# Patient Record
Sex: MALE | Race: WHITE | Employment: UNEMPLOYED | URBAN - METROPOLITAN AREA
[De-identification: names, ages, dates, MRNs, and addresses within clinical notes are randomized per-mention and may not be internally consistent; named-entity substitution may affect disease eponyms.]

---

## 2019-07-28 ENCOUNTER — HOSPITAL ENCOUNTER (INPATIENT)
Age: 49
LOS: 10 days | Discharge: HOME HEALTH | DRG: 871 | End: 2019-08-07
Attending: EMERGENCY MEDICINE | Admitting: INTERNAL MEDICINE
Payer: MEDICARE

## 2019-07-28 ENCOUNTER — APPOINTMENT (OUTPATIENT)
Dept: GENERAL RADIOLOGY | Age: 49
DRG: 871 | End: 2019-07-28
Attending: EMERGENCY MEDICINE
Payer: MEDICARE

## 2019-07-28 DIAGNOSIS — I26.90 ACUTE SEPTIC PULMONARY EMBOLISM WITHOUT ACUTE COR PULMONALE (HCC): ICD-10-CM

## 2019-07-28 DIAGNOSIS — J40 BRONCHITIS: ICD-10-CM

## 2019-07-28 DIAGNOSIS — J32.9 SINUSITIS: ICD-10-CM

## 2019-07-28 DIAGNOSIS — R65.20 SEVERE SEPSIS (HCC): ICD-10-CM

## 2019-07-28 DIAGNOSIS — A41.01 SEPSIS DUE TO METHICILLIN SUSCEPTIBLE STAPHYLOCOCCUS AUREUS (HCC): ICD-10-CM

## 2019-07-28 DIAGNOSIS — B95.61 BACTEREMIA DUE TO METHICILLIN SUSCEPTIBLE STAPHYLOCOCCUS AUREUS (MSSA): ICD-10-CM

## 2019-07-28 DIAGNOSIS — A41.9 SEVERE SEPSIS (HCC): ICD-10-CM

## 2019-07-28 DIAGNOSIS — N10 ACUTE PYELONEPHRITIS: Primary | ICD-10-CM

## 2019-07-28 DIAGNOSIS — R78.81 BACTEREMIA DUE TO METHICILLIN SUSCEPTIBLE STAPHYLOCOCCUS AUREUS (MSSA): ICD-10-CM

## 2019-07-28 DIAGNOSIS — E11.10 DIABETIC KETOACIDOSIS WITHOUT COMA ASSOCIATED WITH TYPE 2 DIABETES MELLITUS (HCC): ICD-10-CM

## 2019-07-28 DIAGNOSIS — J98.4 PULMONARY CAVITARY LESION: ICD-10-CM

## 2019-07-28 LAB
ADMINISTERED INITIALS, ADMINIT: NORMAL
ALBUMIN SERPL-MCNC: 3.3 G/DL (ref 3.5–5)
ALBUMIN/GLOB SERPL: 0.4 {RATIO} (ref 1.2–3.5)
ALP SERPL-CCNC: 131 U/L (ref 50–136)
ALT SERPL-CCNC: 16 U/L (ref 12–65)
ANION GAP SERPL CALC-SCNC: 28 MMOL/L (ref 7–16)
ARTERIAL PATENCY WRIST A: YES
AST SERPL-CCNC: 9 U/L (ref 15–37)
BACTERIA URNS QL MICRO: 0 /HPF
BASE DEFICIT BLD-SCNC: 20 MMOL/L
BDY SITE: ABNORMAL
BILIRUB SERPL-MCNC: 0.6 MG/DL (ref 0.2–1.1)
BODY TEMPERATURE: 98.6
BUN SERPL-MCNC: 25 MG/DL (ref 6–23)
CALCIUM SERPL-MCNC: 10.1 MG/DL (ref 8.3–10.4)
CASTS URNS QL MICRO: ABNORMAL /LPF
CHLORIDE SERPL-SCNC: 94 MMOL/L (ref 98–107)
CO2 BLD-SCNC: <5 MMOL/L
CO2 SERPL-SCNC: 6 MMOL/L (ref 21–32)
COLLECT TIME,HTIME: 2005
CREAT SERPL-MCNC: 1.49 MG/DL (ref 0.8–1.5)
D50 ADMINISTERED, D50ADM: 0 ML
D50 ORDER, D50ORD: 0 ML
DIFFERENTIAL METHOD BLD: ABNORMAL
EPI CELLS #/AREA URNS HPF: ABNORMAL /HPF
ERYTHROCYTE [DISTWIDTH] IN BLOOD BY AUTOMATED COUNT: 14.7 % (ref 11.9–14.6)
GAS FLOW.O2 O2 DELIVERY SYS: ABNORMAL L/MIN
GLOBULIN SER CALC-MCNC: 7.4 G/DL (ref 2.3–3.5)
GLSCOM COMMENTS: NORMAL
GLUCOSE BLD STRIP.AUTO-MCNC: 439 MG/DL (ref 65–100)
GLUCOSE BLD STRIP.AUTO-MCNC: 504 MG/DL (ref 65–100)
GLUCOSE SERPL-MCNC: 539 MG/DL (ref 65–100)
GLUCOSE, GLC: 307 MG/DL
GLUCOSE, GLC: 439 MG/DL
GLUCOSE, GLC: 504 MG/DL
HCO3 BLD-SCNC: 4.6 MMOL/L (ref 22–26)
HCT VFR BLD AUTO: 41.6 % (ref 41.1–50.3)
HGB BLD-MCNC: 12.6 G/DL (ref 13.6–17.2)
HIGH TARGET, HITG: 250 MG/DL
INSULIN ADMINSTERED, INSADM: 11.4 UNITS/HOUR
INSULIN ADMINSTERED, INSADM: 7.4 UNITS/HOUR
INSULIN ADMINSTERED, INSADM: 8.9 UNITS/HOUR
INSULIN ORDER, INSORD: 11.4 UNITS/HOUR
INSULIN ORDER, INSORD: 7.4 UNITS/HOUR
INSULIN ORDER, INSORD: 8.9 UNITS/HOUR
LACTATE BLD-SCNC: 3.35 MMOL/L (ref 0.5–1.9)
LOW TARGET, LOT: 150 MG/DL
LYMPHOCYTES # BLD: 1.6 K/UL (ref 0.5–4.6)
LYMPHOCYTES NFR BLD MANUAL: 8 % (ref 16–44)
MCH RBC QN AUTO: 26.5 PG (ref 26.1–32.9)
MCHC RBC AUTO-ENTMCNC: 30.3 G/DL (ref 31.4–35)
MCV RBC AUTO: 87.4 FL (ref 79.6–97.8)
MINUTES UNTIL NEXT BG, NBG: 60 MIN
MONOCYTES # BLD: 1.2 K/UL (ref 0.1–1.3)
MONOCYTES NFR BLD MANUAL: 6 % (ref 3–9)
MULTIPLIER, MUL: 0.02
MULTIPLIER, MUL: 0.03
MULTIPLIER, MUL: 0.03
NEUTS BAND NFR BLD MANUAL: 6 % (ref 0–10)
NEUTS SEG # BLD: 17.7 K/UL (ref 1.7–8.2)
NEUTS SEG NFR BLD MANUAL: 80 % (ref 47–75)
NRBC # BLD: 0 K/UL (ref 0–0.2)
ORDER INITIALS, ORDINIT: NORMAL
PCO2 BLD: 11.6 MMHG (ref 35–45)
PH BLD: 7.21 [PH] (ref 7.35–7.45)
PLATELET # BLD AUTO: 493 K/UL (ref 150–450)
PLATELET COMMENTS,PCOM: ABNORMAL
PMV BLD AUTO: 9.6 FL (ref 9.4–12.3)
PO2 BLD: 136 MMHG (ref 75–100)
POTASSIUM SERPL-SCNC: 4.8 MMOL/L (ref 3.5–5.1)
PROCALCITONIN SERPL-MCNC: 0.8 NG/ML
PROT SERPL-MCNC: 10.7 G/DL (ref 6.3–8.2)
RBC # BLD AUTO: 4.76 M/UL (ref 4.23–5.6)
RBC #/AREA URNS HPF: ABNORMAL /HPF
RBC MORPH BLD: ABNORMAL
SAO2 % BLD: 99 % (ref 95–98)
SERVICE CMNT-IMP: ABNORMAL
SERVICE CMNT-IMP: ABNORMAL
SODIUM SERPL-SCNC: 128 MMOL/L (ref 136–145)
SPECIMEN TYPE: ABNORMAL
WBC # BLD AUTO: 20.5 K/UL (ref 4.3–11.1)
WBC MORPH BLD: SLIGHT
WBC URNS QL MICRO: >100 /HPF

## 2019-07-28 PROCEDURE — 77030020263 HC SOL INJ SOD CL0.9% LFCR 1000ML

## 2019-07-28 PROCEDURE — 36600 WITHDRAWAL OF ARTERIAL BLOOD: CPT

## 2019-07-28 PROCEDURE — 87150 DNA/RNA AMPLIFIED PROBE: CPT

## 2019-07-28 PROCEDURE — 87205 SMEAR GRAM STAIN: CPT

## 2019-07-28 PROCEDURE — 87077 CULTURE AEROBIC IDENTIFY: CPT

## 2019-07-28 PROCEDURE — 82962 GLUCOSE BLOOD TEST: CPT

## 2019-07-28 PROCEDURE — 93005 ELECTROCARDIOGRAM TRACING: CPT | Performed by: INTERNAL MEDICINE

## 2019-07-28 PROCEDURE — 83605 ASSAY OF LACTIC ACID: CPT

## 2019-07-28 PROCEDURE — 81015 MICROSCOPIC EXAM OF URINE: CPT

## 2019-07-28 PROCEDURE — 65620000000 HC RM CCU GENERAL

## 2019-07-28 PROCEDURE — 87088 URINE BACTERIA CULTURE: CPT

## 2019-07-28 PROCEDURE — 77030019605

## 2019-07-28 PROCEDURE — 80053 COMPREHEN METABOLIC PANEL: CPT

## 2019-07-28 PROCEDURE — 99285 EMERGENCY DEPT VISIT HI MDM: CPT | Performed by: EMERGENCY MEDICINE

## 2019-07-28 PROCEDURE — 87186 SC STD MICRODIL/AGAR DIL: CPT

## 2019-07-28 PROCEDURE — 87086 URINE CULTURE/COLONY COUNT: CPT

## 2019-07-28 PROCEDURE — 74011250636 HC RX REV CODE- 250/636: Performed by: EMERGENCY MEDICINE

## 2019-07-28 PROCEDURE — 74011636637 HC RX REV CODE- 636/637: Performed by: INTERNAL MEDICINE

## 2019-07-28 PROCEDURE — 87040 BLOOD CULTURE FOR BACTERIA: CPT

## 2019-07-28 PROCEDURE — 71045 X-RAY EXAM CHEST 1 VIEW: CPT

## 2019-07-28 PROCEDURE — 74011250636 HC RX REV CODE- 250/636: Performed by: INTERNAL MEDICINE

## 2019-07-28 PROCEDURE — 84145 PROCALCITONIN (PCT): CPT

## 2019-07-28 PROCEDURE — 96375 TX/PRO/DX INJ NEW DRUG ADDON: CPT | Performed by: EMERGENCY MEDICINE

## 2019-07-28 PROCEDURE — 36415 COLL VENOUS BLD VENIPUNCTURE: CPT

## 2019-07-28 PROCEDURE — 83735 ASSAY OF MAGNESIUM: CPT

## 2019-07-28 PROCEDURE — 74011000258 HC RX REV CODE- 258: Performed by: INTERNAL MEDICINE

## 2019-07-28 PROCEDURE — 82803 BLOOD GASES ANY COMBINATION: CPT

## 2019-07-28 PROCEDURE — 96361 HYDRATE IV INFUSION ADD-ON: CPT | Performed by: EMERGENCY MEDICINE

## 2019-07-28 PROCEDURE — 96365 THER/PROPH/DIAG IV INF INIT: CPT | Performed by: EMERGENCY MEDICINE

## 2019-07-28 PROCEDURE — 85025 COMPLETE CBC W/AUTO DIFF WBC: CPT

## 2019-07-28 PROCEDURE — 74011000258 HC RX REV CODE- 258: Performed by: EMERGENCY MEDICINE

## 2019-07-28 RX ORDER — DEXTROSE MONOHYDRATE AND SODIUM CHLORIDE 5; .9 G/100ML; G/100ML
75 INJECTION, SOLUTION INTRAVENOUS CONTINUOUS
Status: DISCONTINUED | OUTPATIENT
Start: 2019-07-29 | End: 2019-07-29

## 2019-07-28 RX ORDER — NALOXONE HYDROCHLORIDE 0.4 MG/ML
0.4 INJECTION, SOLUTION INTRAMUSCULAR; INTRAVENOUS; SUBCUTANEOUS AS NEEDED
Status: DISCONTINUED | OUTPATIENT
Start: 2019-07-28 | End: 2019-08-07 | Stop reason: HOSPADM

## 2019-07-28 RX ORDER — ONDANSETRON 2 MG/ML
4 INJECTION INTRAMUSCULAR; INTRAVENOUS
Status: DISCONTINUED | OUTPATIENT
Start: 2019-07-28 | End: 2019-08-07 | Stop reason: HOSPADM

## 2019-07-28 RX ORDER — ACETAMINOPHEN 325 MG/1
650 TABLET ORAL
Status: DISCONTINUED | OUTPATIENT
Start: 2019-07-28 | End: 2019-08-07 | Stop reason: HOSPADM

## 2019-07-28 RX ORDER — SODIUM CHLORIDE 0.9 % (FLUSH) 0.9 %
5-40 SYRINGE (ML) INJECTION EVERY 8 HOURS
Status: DISCONTINUED | OUTPATIENT
Start: 2019-07-28 | End: 2019-08-07 | Stop reason: HOSPADM

## 2019-07-28 RX ORDER — MORPHINE SULFATE 2 MG/ML
2 INJECTION, SOLUTION INTRAMUSCULAR; INTRAVENOUS
Status: DISPENSED | OUTPATIENT
Start: 2019-07-28 | End: 2019-07-31

## 2019-07-28 RX ORDER — DEXTROSE 40 %
15 GEL (GRAM) ORAL AS NEEDED
Status: DISCONTINUED | OUTPATIENT
Start: 2019-07-28 | End: 2019-07-28

## 2019-07-28 RX ORDER — SODIUM CHLORIDE 0.9 % (FLUSH) 0.9 %
5-40 SYRINGE (ML) INJECTION AS NEEDED
Status: DISCONTINUED | OUTPATIENT
Start: 2019-07-28 | End: 2019-08-07 | Stop reason: HOSPADM

## 2019-07-28 RX ORDER — DIPHENHYDRAMINE HYDROCHLORIDE 50 MG/ML
12.5 INJECTION, SOLUTION INTRAMUSCULAR; INTRAVENOUS
Status: DISCONTINUED | OUTPATIENT
Start: 2019-07-28 | End: 2019-08-07 | Stop reason: HOSPADM

## 2019-07-28 RX ORDER — DEXTROSE 50 % IN WATER (D50W) INTRAVENOUS SYRINGE
25-50 AS NEEDED
Status: DISCONTINUED | OUTPATIENT
Start: 2019-07-28 | End: 2019-07-28

## 2019-07-28 RX ORDER — MORPHINE SULFATE 2 MG/ML
2 INJECTION, SOLUTION INTRAMUSCULAR; INTRAVENOUS ONCE
Status: COMPLETED | OUTPATIENT
Start: 2019-07-29 | End: 2019-07-29

## 2019-07-28 RX ORDER — SODIUM CHLORIDE 0.9 % (FLUSH) 0.9 %
5-10 SYRINGE (ML) INJECTION AS NEEDED
Status: DISCONTINUED | OUTPATIENT
Start: 2019-07-28 | End: 2019-08-07 | Stop reason: HOSPADM

## 2019-07-28 RX ORDER — DEXTROSE 40 %
15 GEL (GRAM) ORAL AS NEEDED
Status: DISCONTINUED | OUTPATIENT
Start: 2019-07-28 | End: 2019-08-07 | Stop reason: HOSPADM

## 2019-07-28 RX ORDER — DEXTROSE 50 % IN WATER (D50W) INTRAVENOUS SYRINGE
25-50 AS NEEDED
Status: DISCONTINUED | OUTPATIENT
Start: 2019-07-28 | End: 2019-08-07 | Stop reason: HOSPADM

## 2019-07-28 RX ORDER — ONDANSETRON 2 MG/ML
4 INJECTION INTRAMUSCULAR; INTRAVENOUS
Status: COMPLETED | OUTPATIENT
Start: 2019-07-28 | End: 2019-07-28

## 2019-07-28 RX ORDER — METOPROLOL TARTRATE 5 MG/5ML
5 INJECTION INTRAVENOUS ONCE
Status: COMPLETED | OUTPATIENT
Start: 2019-07-29 | End: 2019-07-29

## 2019-07-28 RX ORDER — SODIUM CHLORIDE 9 MG/ML
125 INJECTION, SOLUTION INTRAVENOUS CONTINUOUS
Status: DISCONTINUED | OUTPATIENT
Start: 2019-07-29 | End: 2019-07-29

## 2019-07-28 RX ORDER — HEPARIN SODIUM 5000 [USP'U]/ML
5000 INJECTION, SOLUTION INTRAVENOUS; SUBCUTANEOUS EVERY 8 HOURS
Status: DISCONTINUED | OUTPATIENT
Start: 2019-07-28 | End: 2019-07-29

## 2019-07-28 RX ADMIN — CEFTRIAXONE 1 G: 1 INJECTION, POWDER, FOR SOLUTION INTRAMUSCULAR; INTRAVENOUS at 19:53

## 2019-07-28 RX ADMIN — SODIUM CHLORIDE 11.4 UNITS/HR: 900 INJECTION, SOLUTION INTRAVENOUS at 22:41

## 2019-07-28 RX ADMIN — SODIUM CHLORIDE 1000 ML: 900 INJECTION, SOLUTION INTRAVENOUS at 19:52

## 2019-07-28 RX ADMIN — HEPARIN SODIUM 5000 UNITS: 5000 INJECTION INTRAVENOUS; SUBCUTANEOUS at 23:01

## 2019-07-28 RX ADMIN — SODIUM CHLORIDE 8.9 UNITS/HR: 900 INJECTION, SOLUTION INTRAVENOUS at 21:39

## 2019-07-28 RX ADMIN — Medication 10 ML: at 23:02

## 2019-07-28 RX ADMIN — MORPHINE SULFATE 2 MG: 2 INJECTION, SOLUTION INTRAMUSCULAR; INTRAVENOUS at 23:01

## 2019-07-28 RX ADMIN — SODIUM CHLORIDE 448 ML: 900 INJECTION, SOLUTION INTRAVENOUS at 19:53

## 2019-07-28 RX ADMIN — SODIUM CHLORIDE 1000 ML: 900 INJECTION, SOLUTION INTRAVENOUS at 19:53

## 2019-07-28 RX ADMIN — ONDANSETRON 4 MG: 2 INJECTION INTRAMUSCULAR; INTRAVENOUS at 19:52

## 2019-07-28 NOTE — ED TRIAGE NOTES
Patient arrives via EMS with complaints of nausea and vomiting for 2 days. Pt states normal BMs. Patient was recently treated for a UTI, states dysuria remains. Abdomen noted to be distended. Patient complains of generalized pain from head to toe.

## 2019-07-28 NOTE — ED PROVIDER NOTES
57-year-old  male presents to the emergency department complaining of nausea, vomiting over the last 2-3 days with some progressively worsening left flank/left abdominal pain starting today. Patient does describe increased urinary frequency as well as being treated for UTI about 3 weeks ago for one week. He denies any significant cough. The history is provided by the patient and the spouse. Vomiting    This is a new problem. The current episode started more than 2 days ago. The problem occurs 5 to 10 times per day. The problem has been gradually worsening. The emesis has an appearance of stomach contents and clear. There has been no fever. Associated symptoms include chills and abdominal pain (left flank). Pertinent negatives include no fever, no sweats, no diarrhea, no headaches, no arthralgias, no myalgias, no cough, no URI and no headaches. His past medical history is significant for DM. His pertinent negatives include no irritable bowel syndrome, no inflammatory bowel disease, no short gut syndrome, no bowel resection, no recent abdominal surgery, no malabsorption and no gastric bypass.         Past Medical History:   Diagnosis Date    Chronic back pain     DM [facility]) [facility]     DM (diabetes mellitus) ([facility] [facility] 11/18/2013    GERD (gastroesophageal reflux disease)     HTN (hypertension)     HTN (hypertension) 11/18/2013    Snoring        Past Surgical History:   Procedure Laterality Date    HX ORTHOPAEDIC  2010    right fore foot    HX SEPTOPLASTY           Family History:   Problem Relation Age of Onset    Stroke Mother     Heart Disease Father        Social History     Socioeconomic History    Marital status: SINGLE     Spouse name: Not on file    Number of children: Not on file    Years of education: Not on file    Highest education level: Not on file   Occupational History    Not on file   Social Needs    Financial resource strain: Not on file    Food insecurity: Worry: Not on file     Inability: Not on file    Transportation needs:     Medical: Not on file     Non-medical: Not on file   Tobacco Use    Smoking status: Never Smoker    Smokeless tobacco: Never Used   Substance and Sexual Activity    Alcohol use: No    Drug use: No    Sexual activity: Not on file   Lifestyle    Physical activity:     Days per week: Not on file     Minutes per session: Not on file    Stress: Not on file   Relationships    Social connections:     Talks on phone: Not on file     Gets together: Not on file     Attends Buddhist service: Not on file     Active member of club or organization: Not on file     Attends meetings of clubs or organizations: Not on file     Relationship status: Not on file    Intimate partner violence:     Fear of current or ex partner: Not on file     Emotionally abused: Not on file     Physically abused: Not on file     Forced sexual activity: Not on file   Other Topics Concern    Not on file   Social History Narrative    Not on file         ALLERGIES: Influenza virus vaccine, specific    Review of Systems   Constitutional: Positive for chills. Negative for fever. Respiratory: Positive for shortness of breath. Negative for cough and wheezing. Gastrointestinal: Positive for abdominal pain (left flank), nausea and vomiting. Negative for blood in stool, constipation and diarrhea. Genitourinary: Positive for dysuria and frequency. Negative for testicular pain. Musculoskeletal: Negative for arthralgias and myalgias. Neurological: Negative for headaches. All other systems reviewed and are negative. Vitals:    07/28/19 1845   BP: 151/84   Pulse: (!) 112   Resp: 26   Temp: 97.6 °F (36.4 °C)   Weight: 81.6 kg (180 lb)   Height: 5' 11\" (1.803 m)            Physical Exam   Constitutional: He is oriented to person, place, and time. He appears well-developed and well-nourished. He appears distressed. HENT:   Head: Normocephalic and atraumatic.    Right Ear: External ear normal.   Left Ear: External ear normal.   Mouth/Throat: Oropharynx is clear and moist.   Eyes: Pupils are equal, round, and reactive to light. Conjunctivae and EOM are normal.   Neck: Normal range of motion. Neck supple. Cardiovascular: Normal rate, regular rhythm, normal heart sounds and intact distal pulses. Pulmonary/Chest: Effort normal and breath sounds normal. No accessory muscle usage. Tachypnea noted. He has no wheezes. He has no rhonchi. He has no rales. Abdominal: Soft. Bowel sounds are normal. He exhibits no shifting dullness, no distension, no pulsatile liver, no fluid wave, no abdominal bruit, no ascites, no pulsatile midline mass and no mass. There is no hepatosplenomegaly. There is tenderness in the left lower quadrant. There is CVA tenderness (left). No hernia. Musculoskeletal: Normal range of motion. He exhibits no edema. Neurological: He is alert and oriented to person, place, and time. He has normal strength. No sensory deficit. No evidence of meningismus   Skin: Skin is warm and dry. Capillary refill takes less than 2 seconds. Psychiatric: He has a normal mood and affect. His speech is normal.   Nursing note and vitals reviewed.        MDM  Number of Diagnoses or Management Options  Diabetic ketoacidosis without coma associated with type 2 diabetes mellitus ([facility] [address]): new and requires workup  Severe sepsis [facility]): new and requires workup     Amount and/or Complexity of Data Reviewed  Clinical lab tests: ordered and reviewed  Tests in the radiology section of CPT®: ordered and reviewed  Obtain history from someone other than the patient: yes  Review and summarize past medical records: yes  Discuss the patient with other providers: yes  Independent visualization of images, tracings, or specimens: yes    Risk of Complications, Morbidity, and/or Mortality  Presenting problems: high  Diagnostic procedures: moderate  Management options: high    Critical Care  Total time providing critical care: (===================================================================  This patient is critically ill and there is a high probability of of imminent or life threatening deterioration in the patient's condition without immediate management. The nature of the patient's clinical problem is: severe sepsis, DKA    I have spent 45 minutes in direct patient care, documentation, review of labs/xrays/old records, discussion with Family, Staff, Colleague, Nursing . The time involved in the performance of separately reportable procedures was not counted toward critical care time. Clayton Gurrola MD; 7/28/2019 @8:41 PM  ===================================================================    )    Patient Progress  Patient progress: stable         Procedures    The patient was observed in the ED. Patient was treated with severe sepsis due to probable urinary source with Rocephin and fluids, he'll be placed on a glucose stabilizer per protocol for his DKA. Case was discussed with the hospitalist will admit to the ICU.     Results Reviewed:      Recent Results (from the past 24 hour(s))   POC LACTIC ACID    Collection Time: 07/28/19  6:57 PM   Result Value Ref Range    Lactic Acid (POC) 3.35 (H) 0.5 - 1.9 mmol/L   URINE MICROSCOPIC    Collection Time: 07/28/19  7:01 PM   Result Value Ref Range    WBC >100 (H) 0 /hpf    RBC 20-50 0 /hpf    Epithelial cells 0-3 0 /hpf    Bacteria 0 0 /hpf    Casts 0-3 0 /lpf   CBC WITH AUTOMATED DIFF    Collection Time: 07/28/19  7:33 PM   Result Value Ref Range    WBC 20.5 (H) 4.3 - 11.1 K/uL    RBC 4.76 4.23 - 5.6 M/uL    HGB 12.6 (L) 13.6 - 17.2 g/dL    HCT 41.6 41.1 - 50.3 %    MCV 87.4 79.6 - 97.8 FL    MCH 26.5 26.1 - 32.9 PG    MCHC 30.3 (L) 31.4 - 35.0 g/dL    RDW 14.7 (H) 11.9 - 14.6 %    PLATELET 291 (H) 729 - 450 K/uL    MPV 9.6 9.4 - 12.3 FL    ABSOLUTE NRBC 0.00 0.0 - 0.2 K/uL    NEUTROPHILS 80 (H) 47 - 75 %    BAND NEUTROPHILS 6 0 - 10 % LYMPHOCYTES 8 (L) 16 - 44 %    MONOCYTES 6 3 - 9 %    ABS. NEUTROPHILS 17.7 (H) 1.7 - 8.2 K/UL    ABS. LYMPHOCYTES 1.6 0.5 - 4.6 K/UL    ABS. MONOCYTES 1.2 0.1 - 1.3 K/UL    RBC COMMENTS SLIGHT  ANISOCYTOSIS + POIKILOCYTOSIS        WBC COMMENTS SLIGHT      PLATELET COMMENTS INCREASED      DF MANUAL     METABOLIC PANEL, COMPREHENSIVE    Collection Time: 07/28/19  7:33 PM   Result Value Ref Range    Sodium 128 (L) 136 - 145 mmol/L    Potassium 4.8 3.5 - 5.1 mmol/L    Chloride 94 (L) 98 - 107 mmol/L    CO2 6 (LL) 21 - 32 mmol/L    Anion gap 28 (H) 7 - 16 mmol/L    Glucose 539 (HH) 65 - 100 mg/dL    BUN 25 (H) 6 - 23 MG/DL    Creatinine 1.49 0.8 - 1.5 MG/DL    GFR est AA >60 >60 ml/min/1.73m2    GFR est non-AA 53 (L) >60 ml/min/1.73m2    Calcium 10.1 8.3 - 10.4 MG/DL    Bilirubin, total 0.6 0.2 - 1.1 MG/DL    ALT (SGPT) 16 12 - 65 U/L    AST (SGOT) 9 (L) 15 - 37 U/L    Alk. phosphatase 131 50 - 136 U/L    Protein, total 10.7 (H) 6.3 - 8.2 g/dL    Albumin 3.3 (L) 3.5 - 5.0 g/dL    Globulin 7.4 (H) 2.3 - 3.5 g/dL    A-G Ratio 0.4 (L) 1.2 - 3.5     POC G3    Collection Time: 07/28/19  8:10 PM   Result Value Ref Range    Device: ROOM AIR      pH (POC) 7.210 (LL) 7.35 - 7.45      pCO2 (POC) 11.6 (LL) 35 - 45 MMHG    pO2 (POC) 136 (H) 75 - 100 MMHG    HCO3 (POC) 4.6 (L) 22 - 26 MMOL/L    sO2 (POC) 99 (H) 95 - 98 %    Base deficit (POC) 20 mmol/L    Allens test (POC) YES      Site RIGHT RADIAL      Patient temp. 98.6      Specimen type (POC) ARTERIAL      Performed by WhitfieldWalterRT     CO2, POC <5 MMOL/L    Respiratory comment: PhysicianNotified     COLLECT TIME 2,005       XR CHEST PORT   Final Result   IMPRESSION:      1. No pulmonary consolidation. Dictated using voice recognition software.  Proofread, but unrecognized errors may exist.

## 2019-07-29 ENCOUNTER — APPOINTMENT (OUTPATIENT)
Dept: CT IMAGING | Age: 49
DRG: 871 | End: 2019-07-29
Attending: INTERNAL MEDICINE
Payer: MEDICARE

## 2019-07-29 PROBLEM — N39.0 SEPSIS SECONDARY TO UTI (HCC): Status: ACTIVE | Noted: 2019-07-29

## 2019-07-29 PROBLEM — A41.9 SEPSIS SECONDARY TO UTI (HCC): Status: ACTIVE | Noted: 2019-07-29

## 2019-07-29 LAB
ADMINISTERED INITIALS, ADMINIT: NORMAL
ANION GAP SERPL CALC-SCNC: 10 MMOL/L (ref 7–16)
ANION GAP SERPL CALC-SCNC: 12 MMOL/L (ref 7–16)
ANION GAP SERPL CALC-SCNC: 16 MMOL/L (ref 7–16)
ANION GAP SERPL CALC-SCNC: 22 MMOL/L (ref 7–16)
ATRIAL RATE: 121 BPM
BASOPHILS # BLD: 0 K/UL (ref 0–0.2)
BASOPHILS NFR BLD: 0 % (ref 0–2)
BUN SERPL-MCNC: 10 MG/DL (ref 6–23)
BUN SERPL-MCNC: 14 MG/DL (ref 6–23)
BUN SERPL-MCNC: 16 MG/DL (ref 6–23)
BUN SERPL-MCNC: 16 MG/DL (ref 6–23)
BUN SERPL-MCNC: 19 MG/DL (ref 6–23)
BUN SERPL-MCNC: 21 MG/DL (ref 6–23)
CALCIUM SERPL-MCNC: 8.3 MG/DL (ref 8.3–10.4)
CALCIUM SERPL-MCNC: 8.5 MG/DL (ref 8.3–10.4)
CALCIUM SERPL-MCNC: 8.7 MG/DL (ref 8.3–10.4)
CALCIUM SERPL-MCNC: 9.5 MG/DL (ref 8.3–10.4)
CALCULATED P AXIS, ECG09: 65 DEGREES
CALCULATED R AXIS, ECG10: 46 DEGREES
CALCULATED T AXIS, ECG11: 76 DEGREES
CHLORIDE SERPL-SCNC: 106 MMOL/L (ref 98–107)
CHLORIDE SERPL-SCNC: 107 MMOL/L (ref 98–107)
CHLORIDE SERPL-SCNC: 108 MMOL/L (ref 98–107)
CHLORIDE SERPL-SCNC: 110 MMOL/L (ref 98–107)
CHLORIDE SERPL-SCNC: 113 MMOL/L (ref 98–107)
CHLORIDE SERPL-SCNC: 113 MMOL/L (ref 98–107)
CO2 SERPL-SCNC: 13 MMOL/L (ref 21–32)
CO2 SERPL-SCNC: 16 MMOL/L (ref 21–32)
CO2 SERPL-SCNC: 17 MMOL/L (ref 21–32)
CO2 SERPL-SCNC: 17 MMOL/L (ref 21–32)
CO2 SERPL-SCNC: 18 MMOL/L (ref 21–32)
CO2 SERPL-SCNC: 9 MMOL/L (ref 21–32)
CREAT SERPL-MCNC: 0.7 MG/DL (ref 0.8–1.5)
CREAT SERPL-MCNC: 0.73 MG/DL (ref 0.8–1.5)
CREAT SERPL-MCNC: 0.81 MG/DL (ref 0.8–1.5)
CREAT SERPL-MCNC: 0.91 MG/DL (ref 0.8–1.5)
CREAT SERPL-MCNC: 1.08 MG/DL (ref 0.8–1.5)
CREAT SERPL-MCNC: 1.36 MG/DL (ref 0.8–1.5)
D50 ADMINISTERED, D50ADM: 0 ML
D50 ORDER, D50ORD: 0 ML
DIAGNOSIS, 93000: NORMAL
DIFFERENTIAL METHOD BLD: ABNORMAL
EOSINOPHIL # BLD: 0 K/UL (ref 0–0.8)
EOSINOPHIL NFR BLD: 0 % (ref 0.5–7.8)
ERYTHROCYTE [DISTWIDTH] IN BLOOD BY AUTOMATED COUNT: 14.6 % (ref 11.9–14.6)
GLSCOM COMMENTS: NORMAL
GLUCOSE BLD STRIP.AUTO-MCNC: 155 MG/DL (ref 65–100)
GLUCOSE BLD STRIP.AUTO-MCNC: 159 MG/DL (ref 65–100)
GLUCOSE BLD STRIP.AUTO-MCNC: 163 MG/DL (ref 65–100)
GLUCOSE BLD STRIP.AUTO-MCNC: 163 MG/DL (ref 65–100)
GLUCOSE BLD STRIP.AUTO-MCNC: 164 MG/DL (ref 65–100)
GLUCOSE BLD STRIP.AUTO-MCNC: 165 MG/DL (ref 65–100)
GLUCOSE BLD STRIP.AUTO-MCNC: 167 MG/DL (ref 65–100)
GLUCOSE BLD STRIP.AUTO-MCNC: 168 MG/DL (ref 65–100)
GLUCOSE BLD STRIP.AUTO-MCNC: 174 MG/DL (ref 65–100)
GLUCOSE BLD STRIP.AUTO-MCNC: 178 MG/DL (ref 65–100)
GLUCOSE BLD STRIP.AUTO-MCNC: 178 MG/DL (ref 65–100)
GLUCOSE BLD STRIP.AUTO-MCNC: 191 MG/DL (ref 65–100)
GLUCOSE BLD STRIP.AUTO-MCNC: 196 MG/DL (ref 65–100)
GLUCOSE BLD STRIP.AUTO-MCNC: 198 MG/DL (ref 65–100)
GLUCOSE BLD STRIP.AUTO-MCNC: 202 MG/DL (ref 65–100)
GLUCOSE BLD STRIP.AUTO-MCNC: 203 MG/DL (ref 65–100)
GLUCOSE BLD STRIP.AUTO-MCNC: 204 MG/DL (ref 65–100)
GLUCOSE BLD STRIP.AUTO-MCNC: 204 MG/DL (ref 65–100)
GLUCOSE BLD STRIP.AUTO-MCNC: 209 MG/DL (ref 65–100)
GLUCOSE BLD STRIP.AUTO-MCNC: 210 MG/DL (ref 65–100)
GLUCOSE BLD STRIP.AUTO-MCNC: 237 MG/DL (ref 65–100)
GLUCOSE BLD STRIP.AUTO-MCNC: 274 MG/DL (ref 65–100)
GLUCOSE BLD STRIP.AUTO-MCNC: 307 MG/DL (ref 65–100)
GLUCOSE SERPL-MCNC: 174 MG/DL (ref 65–100)
GLUCOSE SERPL-MCNC: 176 MG/DL (ref 65–100)
GLUCOSE SERPL-MCNC: 180 MG/DL (ref 65–100)
GLUCOSE SERPL-MCNC: 191 MG/DL (ref 65–100)
GLUCOSE SERPL-MCNC: 212 MG/DL (ref 65–100)
GLUCOSE SERPL-MCNC: 342 MG/DL (ref 65–100)
GLUCOSE, GLC: 151 MG/DL
GLUCOSE, GLC: 155 MG/DL
GLUCOSE, GLC: 159 MG/DL
GLUCOSE, GLC: 163 MG/DL
GLUCOSE, GLC: 163 MG/DL
GLUCOSE, GLC: 164 MG/DL
GLUCOSE, GLC: 165 MG/DL
GLUCOSE, GLC: 167 MG/DL
GLUCOSE, GLC: 168 MG/DL
GLUCOSE, GLC: 174 MG/DL
GLUCOSE, GLC: 178 MG/DL
GLUCOSE, GLC: 178 MG/DL
GLUCOSE, GLC: 191 MG/DL
GLUCOSE, GLC: 194 MG/DL
GLUCOSE, GLC: 196 MG/DL
GLUCOSE, GLC: 202 MG/DL
GLUCOSE, GLC: 203 MG/DL
GLUCOSE, GLC: 204 MG/DL
GLUCOSE, GLC: 204 MG/DL
GLUCOSE, GLC: 209 MG/DL
GLUCOSE, GLC: 210 MG/DL
GLUCOSE, GLC: 237 MG/DL
GLUCOSE, GLC: 274 MG/DL
HCT VFR BLD AUTO: 35.8 % (ref 41.1–50.3)
HGB BLD-MCNC: 11.1 G/DL (ref 13.6–17.2)
HIGH TARGET, HITG: 180 MG/DL
HIGH TARGET, HITG: 250 MG/DL
IMM GRANULOCYTES # BLD AUTO: 0.1 K/UL (ref 0–0.5)
IMM GRANULOCYTES NFR BLD AUTO: 1 % (ref 0–5)
INSULIN ADMINSTERED, INSADM: 3.8 UNITS/HOUR
INSULIN ADMINSTERED, INSADM: 4.1 UNITS/HOUR
INSULIN ADMINSTERED, INSADM: 4.1 UNITS/HOUR
INSULIN ADMINSTERED, INSADM: 4.2 UNITS/HOUR
INSULIN ADMINSTERED, INSADM: 4.2 UNITS/HOUR
INSULIN ADMINSTERED, INSADM: 4.3 UNITS/HOUR
INSULIN ADMINSTERED, INSADM: 4.6 UNITS/HOUR
INSULIN ADMINSTERED, INSADM: 4.7 UNITS/HOUR
INSULIN ADMINSTERED, INSADM: 5.2 UNITS/HOUR
INSULIN ADMINSTERED, INSADM: 5.4 UNITS/HOUR
INSULIN ADMINSTERED, INSADM: 5.5 UNITS/HOUR
INSULIN ADMINSTERED, INSADM: 5.7 UNITS/HOUR
INSULIN ADMINSTERED, INSADM: 5.8 UNITS/HOUR
INSULIN ADMINSTERED, INSADM: 5.8 UNITS/HOUR
INSULIN ADMINSTERED, INSADM: 5.9 UNITS/HOUR
INSULIN ADMINSTERED, INSADM: 5.9 UNITS/HOUR
INSULIN ADMINSTERED, INSADM: 6 UNITS/HOUR
INSULIN ADMINSTERED, INSADM: 6 UNITS/HOUR
INSULIN ADMINSTERED, INSADM: 6.4 UNITS/HOUR
INSULIN ADMINSTERED, INSADM: 7.1 UNITS/HOUR
INSULIN ADMINSTERED, INSADM: 7.2 UNITS/HOUR
INSULIN ADMINSTERED, INSADM: 8 UNITS/HOUR
INSULIN ADMINSTERED, INSADM: 8.6 UNITS/HOUR
INSULIN ORDER, INSORD: 3.8 UNITS/HOUR
INSULIN ORDER, INSORD: 4.1 UNITS/HOUR
INSULIN ORDER, INSORD: 4.1 UNITS/HOUR
INSULIN ORDER, INSORD: 4.2 UNITS/HOUR
INSULIN ORDER, INSORD: 4.2 UNITS/HOUR
INSULIN ORDER, INSORD: 4.3 UNITS/HOUR
INSULIN ORDER, INSORD: 4.6 UNITS/HOUR
INSULIN ORDER, INSORD: 4.7 UNITS/HOUR
INSULIN ORDER, INSORD: 5.2 UNITS/HOUR
INSULIN ORDER, INSORD: 5.4 UNITS/HOUR
INSULIN ORDER, INSORD: 5.5 UNITS/HOUR
INSULIN ORDER, INSORD: 5.7 UNITS/HOUR
INSULIN ORDER, INSORD: 5.8 UNITS/HOUR
INSULIN ORDER, INSORD: 5.8 UNITS/HOUR
INSULIN ORDER, INSORD: 5.9 UNITS/HOUR
INSULIN ORDER, INSORD: 5.9 UNITS/HOUR
INSULIN ORDER, INSORD: 6 UNITS/HOUR
INSULIN ORDER, INSORD: 6 UNITS/HOUR
INSULIN ORDER, INSORD: 6.4 UNITS/HOUR
INSULIN ORDER, INSORD: 7.1 UNITS/HOUR
INSULIN ORDER, INSORD: 7.2 UNITS/HOUR
INSULIN ORDER, INSORD: 8 UNITS/HOUR
INSULIN ORDER, INSORD: 8.6 UNITS/HOUR
LACTATE SERPL-SCNC: 1.6 MMOL/L (ref 0.4–2)
LOW TARGET, LOT: 140 MG/DL
LOW TARGET, LOT: 150 MG/DL
LYMPHOCYTES # BLD: 0.5 K/UL (ref 0.5–4.6)
LYMPHOCYTES NFR BLD: 4 % (ref 13–44)
MAGNESIUM SERPL-MCNC: 1.7 MG/DL (ref 1.8–2.4)
MAGNESIUM SERPL-MCNC: 2 MG/DL (ref 1.8–2.4)
MAGNESIUM SERPL-MCNC: 2.2 MG/DL (ref 1.8–2.4)
MCH RBC QN AUTO: 26.1 PG (ref 26.1–32.9)
MCHC RBC AUTO-ENTMCNC: 31 G/DL (ref 31.4–35)
MCV RBC AUTO: 84.2 FL (ref 79.6–97.8)
MINUTES UNTIL NEXT BG, NBG: 60 MIN
MONOCYTES # BLD: 1 K/UL (ref 0.1–1.3)
MONOCYTES NFR BLD: 9 % (ref 4–12)
MULTIPLIER, MUL: 0.04
MULTIPLIER, MUL: 0.05
MULTIPLIER, MUL: 0.05
MULTIPLIER, MUL: 0.06
NEUTS SEG # BLD: 10 K/UL (ref 1.7–8.2)
NEUTS SEG NFR BLD: 86 % (ref 43–78)
NRBC # BLD: 0 K/UL (ref 0–0.2)
ORDER INITIALS, ORDINIT: NORMAL
P-R INTERVAL, ECG05: 164 MS
PLATELET # BLD AUTO: 343 K/UL (ref 150–450)
PLATELET COMMENTS,PCOM: ADEQUATE
PMV BLD AUTO: 9.4 FL (ref 9.4–12.3)
POTASSIUM SERPL-SCNC: 3.4 MMOL/L (ref 3.5–5.1)
POTASSIUM SERPL-SCNC: 3.4 MMOL/L (ref 3.5–5.1)
POTASSIUM SERPL-SCNC: 3.6 MMOL/L (ref 3.5–5.1)
POTASSIUM SERPL-SCNC: 3.8 MMOL/L (ref 3.5–5.1)
POTASSIUM SERPL-SCNC: 4.3 MMOL/L (ref 3.5–5.1)
POTASSIUM SERPL-SCNC: 4.4 MMOL/L (ref 3.5–5.1)
Q-T INTERVAL, ECG07: 294 MS
QRS DURATION, ECG06: 88 MS
QTC CALCULATION (BEZET), ECG08: 417 MS
RBC # BLD AUTO: 4.25 M/UL (ref 4.23–5.6)
RBC MORPH BLD: ABNORMAL
SODIUM SERPL-SCNC: 135 MMOL/L (ref 136–145)
SODIUM SERPL-SCNC: 137 MMOL/L (ref 136–145)
SODIUM SERPL-SCNC: 137 MMOL/L (ref 136–145)
SODIUM SERPL-SCNC: 139 MMOL/L (ref 136–145)
SODIUM SERPL-SCNC: 141 MMOL/L (ref 136–145)
SODIUM SERPL-SCNC: 142 MMOL/L (ref 136–145)
VENTRICULAR RATE, ECG03: 121 BPM
WBC # BLD AUTO: 11.6 K/UL (ref 4.3–11.1)
WBC MORPH BLD: ABNORMAL

## 2019-07-29 PROCEDURE — 83605 ASSAY OF LACTIC ACID: CPT

## 2019-07-29 PROCEDURE — 65620000000 HC RM CCU GENERAL

## 2019-07-29 PROCEDURE — 74011636637 HC RX REV CODE- 636/637: Performed by: INTERNAL MEDICINE

## 2019-07-29 PROCEDURE — 83735 ASSAY OF MAGNESIUM: CPT

## 2019-07-29 PROCEDURE — 74011250637 HC RX REV CODE- 250/637: Performed by: INTERNAL MEDICINE

## 2019-07-29 PROCEDURE — 74011000258 HC RX REV CODE- 258: Performed by: INTERNAL MEDICINE

## 2019-07-29 PROCEDURE — 82962 GLUCOSE BLOOD TEST: CPT

## 2019-07-29 PROCEDURE — 80048 BASIC METABOLIC PNL TOTAL CA: CPT

## 2019-07-29 PROCEDURE — 36415 COLL VENOUS BLD VENIPUNCTURE: CPT

## 2019-07-29 PROCEDURE — 74011000250 HC RX REV CODE- 250: Performed by: INTERNAL MEDICINE

## 2019-07-29 PROCEDURE — 77030020253 HC SOL INJ D545NS .05 DEX .45 SAL

## 2019-07-29 PROCEDURE — 71260 CT THORAX DX C+: CPT

## 2019-07-29 PROCEDURE — 74011250636 HC RX REV CODE- 250/636: Performed by: INTERNAL MEDICINE

## 2019-07-29 PROCEDURE — 74011636320 HC RX REV CODE- 636/320: Performed by: INTERNAL MEDICINE

## 2019-07-29 PROCEDURE — 85025 COMPLETE CBC W/AUTO DIFF WBC: CPT

## 2019-07-29 PROCEDURE — 74176 CT ABD & PELVIS W/O CONTRAST: CPT

## 2019-07-29 RX ORDER — PIOGLITAZONEHYDROCHLORIDE 15 MG/1
15 TABLET ORAL DAILY
Status: DISCONTINUED | OUTPATIENT
Start: 2019-07-29 | End: 2019-07-29

## 2019-07-29 RX ORDER — FLUTICASONE PROPIONATE 50 MCG
2 SPRAY, SUSPENSION (ML) NASAL DAILY
Status: DISCONTINUED | OUTPATIENT
Start: 2019-07-29 | End: 2019-08-07 | Stop reason: HOSPADM

## 2019-07-29 RX ORDER — BROMPHENIRAMINE MALEATE, PSEUDOEPHEDRINE HYDROCHLORIDE, AND DEXTROMETHORPHAN HYDROBROMIDE 2; 30; 10 MG/5ML; MG/5ML; MG/5ML
5 SYRUP ORAL
Status: DISCONTINUED | OUTPATIENT
Start: 2019-07-29 | End: 2019-08-07 | Stop reason: HOSPADM

## 2019-07-29 RX ORDER — PRAVASTATIN SODIUM 20 MG/1
10 TABLET ORAL
Status: DISCONTINUED | OUTPATIENT
Start: 2019-07-29 | End: 2019-08-07 | Stop reason: HOSPADM

## 2019-07-29 RX ORDER — SODIUM CHLORIDE 0.9 % (FLUSH) 0.9 %
10 SYRINGE (ML) INJECTION
Status: COMPLETED | OUTPATIENT
Start: 2019-07-29 | End: 2019-07-29

## 2019-07-29 RX ORDER — ALBUTEROL SULFATE 0.83 MG/ML
2.5 SOLUTION RESPIRATORY (INHALATION)
Status: DISCONTINUED | OUTPATIENT
Start: 2019-07-29 | End: 2019-08-07 | Stop reason: HOSPADM

## 2019-07-29 RX ORDER — NAPROXEN 500 MG/1
500 TABLET ORAL
COMMUNITY
End: 2019-08-07

## 2019-07-29 RX ORDER — GLIPIZIDE 10 MG/1
10 TABLET ORAL 2 TIMES DAILY
COMMUNITY

## 2019-07-29 RX ORDER — POTASSIUM CHLORIDE 20 MEQ/1
40 TABLET, EXTENDED RELEASE ORAL
Status: COMPLETED | OUTPATIENT
Start: 2019-07-29 | End: 2019-07-29

## 2019-07-29 RX ORDER — DEXTROSE MONOHYDRATE AND SODIUM CHLORIDE 5; .45 G/100ML; G/100ML
125 INJECTION, SOLUTION INTRAVENOUS CONTINUOUS
Status: DISPENSED | OUTPATIENT
Start: 2019-07-29 | End: 2019-07-30

## 2019-07-29 RX ORDER — SERTRALINE HYDROCHLORIDE 50 MG/1
50 TABLET, FILM COATED ORAL DAILY
Status: DISCONTINUED | OUTPATIENT
Start: 2019-07-29 | End: 2019-07-31

## 2019-07-29 RX ORDER — INSULIN GLARGINE 100 [IU]/ML
45 INJECTION, SOLUTION SUBCUTANEOUS
COMMUNITY
End: 2019-08-07

## 2019-07-29 RX ORDER — POTASSIUM CHLORIDE 20 MEQ/1
40 TABLET, EXTENDED RELEASE ORAL
Status: DISCONTINUED | OUTPATIENT
Start: 2019-07-29 | End: 2019-07-29

## 2019-07-29 RX ORDER — ENOXAPARIN SODIUM 100 MG/ML
40 INJECTION SUBCUTANEOUS EVERY 24 HOURS
Status: DISCONTINUED | OUTPATIENT
Start: 2019-07-29 | End: 2019-08-07 | Stop reason: HOSPADM

## 2019-07-29 RX ORDER — OMEPRAZOLE 40 MG/1
40 CAPSULE, DELAYED RELEASE ORAL DAILY
COMMUNITY

## 2019-07-29 RX ORDER — SODIUM BICARBONATE 650 MG/1
650 TABLET ORAL 3 TIMES DAILY
Status: COMPLETED | OUTPATIENT
Start: 2019-07-29 | End: 2019-07-29

## 2019-07-29 RX ORDER — POTASSIUM CHLORIDE 14.9 MG/ML
20 INJECTION INTRAVENOUS
Status: COMPLETED | OUTPATIENT
Start: 2019-07-29 | End: 2019-07-30

## 2019-07-29 RX ADMIN — Medication 10 ML: at 14:41

## 2019-07-29 RX ADMIN — IOPAMIDOL 100 ML: 755 INJECTION, SOLUTION INTRAVENOUS at 14:40

## 2019-07-29 RX ADMIN — MORPHINE SULFATE 2 MG: 2 INJECTION, SOLUTION INTRAMUSCULAR; INTRAVENOUS at 13:33

## 2019-07-29 RX ADMIN — CEFTRIAXONE SODIUM 1 G: 1 INJECTION, POWDER, FOR SOLUTION INTRAMUSCULAR; INTRAVENOUS at 01:57

## 2019-07-29 RX ADMIN — CEFTRIAXONE 2 G: 2 INJECTION, POWDER, FOR SOLUTION INTRAMUSCULAR; INTRAVENOUS at 22:52

## 2019-07-29 RX ADMIN — SODIUM BICARBONATE 650 MG TABLET 650 MG: at 11:17

## 2019-07-29 RX ADMIN — SODIUM BICARBONATE 650 MG TABLET 650 MG: at 16:13

## 2019-07-29 RX ADMIN — SODIUM CHLORIDE 1000 ML: 900 INJECTION, SOLUTION INTRAVENOUS at 00:06

## 2019-07-29 RX ADMIN — MORPHINE SULFATE 2 MG: 2 INJECTION, SOLUTION INTRAMUSCULAR; INTRAVENOUS at 04:03

## 2019-07-29 RX ADMIN — MORPHINE SULFATE 2 MG: 2 INJECTION, SOLUTION INTRAMUSCULAR; INTRAVENOUS at 23:16

## 2019-07-29 RX ADMIN — SODIUM CHLORIDE 4.1 UNITS/HR: 900 INJECTION, SOLUTION INTRAVENOUS at 11:10

## 2019-07-29 RX ADMIN — POTASSIUM CHLORIDE 20 MEQ: 200 INJECTION, SOLUTION INTRAVENOUS at 19:17

## 2019-07-29 RX ADMIN — MORPHINE SULFATE 2 MG: 2 INJECTION, SOLUTION INTRAMUSCULAR; INTRAVENOUS at 00:03

## 2019-07-29 RX ADMIN — HEPARIN SODIUM 5000 UNITS: 5000 INJECTION INTRAVENOUS; SUBCUTANEOUS at 06:04

## 2019-07-29 RX ADMIN — Medication 10 ML: at 13:17

## 2019-07-29 RX ADMIN — DEXTROSE MONOHYDRATE AND SODIUM CHLORIDE 125 ML/HR: 5; .45 INJECTION, SOLUTION INTRAVENOUS at 11:20

## 2019-07-29 RX ADMIN — POTASSIUM CHLORIDE 20 MEQ: 200 INJECTION, SOLUTION INTRAVENOUS at 21:33

## 2019-07-29 RX ADMIN — ENOXAPARIN SODIUM 40 MG: 40 INJECTION SUBCUTANEOUS at 11:17

## 2019-07-29 RX ADMIN — MORPHINE SULFATE 2 MG: 2 INJECTION, SOLUTION INTRAMUSCULAR; INTRAVENOUS at 08:51

## 2019-07-29 RX ADMIN — MORPHINE SULFATE 2 MG: 2 INJECTION, SOLUTION INTRAMUSCULAR; INTRAVENOUS at 19:17

## 2019-07-29 RX ADMIN — DEXTROSE MONOHYDRATE AND SODIUM CHLORIDE 125 ML/HR: 5; .45 INJECTION, SOLUTION INTRAVENOUS at 19:55

## 2019-07-29 RX ADMIN — SODIUM CHLORIDE 125 ML/HR: 900 INJECTION, SOLUTION INTRAVENOUS at 01:04

## 2019-07-29 RX ADMIN — METOPROLOL TARTRATE 5 MG: 5 INJECTION INTRAVENOUS at 01:04

## 2019-07-29 RX ADMIN — ACETAMINOPHEN 650 MG: 325 TABLET, FILM COATED ORAL at 19:24

## 2019-07-29 RX ADMIN — SODIUM CHLORIDE 100 ML: 900 INJECTION, SOLUTION INTRAVENOUS at 14:41

## 2019-07-29 RX ADMIN — POTASSIUM CHLORIDE 20 MEQ: 200 INJECTION, SOLUTION INTRAVENOUS at 17:23

## 2019-07-29 RX ADMIN — SODIUM BICARBONATE 650 MG TABLET 650 MG: at 21:32

## 2019-07-29 RX ADMIN — PRAVASTATIN SODIUM 10 MG: 20 TABLET ORAL at 21:33

## 2019-07-29 RX ADMIN — Medication 10 ML: at 06:05

## 2019-07-29 RX ADMIN — Medication 10 ML: at 22:00

## 2019-07-29 RX ADMIN — POTASSIUM CHLORIDE 40 MEQ: 20 TABLET, EXTENDED RELEASE ORAL at 13:17

## 2019-07-29 RX ADMIN — DEXTROSE MONOHYDRATE AND SODIUM CHLORIDE 125 ML/HR: 5; .9 INJECTION, SOLUTION INTRAVENOUS at 01:53

## 2019-07-29 NOTE — PROGRESS NOTES
LEAPFROG PROTOCOL NOTE    Kelvin Cesar  7/29/2019    The patient is currently in the critical care setting managed by Dr. Peyton Rojo with DKA and UTI. The patient's chart is reviewed and the patient is discussed with the staff. Patient is currently hemodynamically stable. Patient has no needs identified for Intensivist management in the critical care setting at this time. Please notify us if can be of assistance. No charge billed to the patient. Thank you.     Saul Howe MD

## 2019-07-29 NOTE — DIABETES MGMT
Patient admitted with DKA, seen by diabetes educator. Patient voices a positive family history of DM and states he was diagnosed at least 10-11 years ago. Blood glucose 539 on admission. Patient placed on an insulin drip. Most recent blood glucose 178. Gap 16. A1c 9.6 (eA). A1c shows DM chronically uncontrolled ranging 9.4-11.8 since 2013. Patient states he check his blood glucose once a day at home and is compliant with his regimen which he can't state what it is. \" They have my meds out there. \" Asked patient if he took insulin. \"I don't know. \" Wife states he takes an \"A1c shot\" . Checked PTA meds with primary RN. There is a Lantus pen needle in patient's med bag. Notified patient and spouse patient does take insulin. Patient states he takes 45 units at night and last took it on Saturday night. Other meds were: Januvia 25 mg daily, glipizide 10 mg daily, and metformin 1000 mg BID. Left educational material at bedside. \" Oh I know all about it. \" Encouraged patient to review at his convenience and educator will follow up with him tomorrow.

## 2019-07-29 NOTE — ED NOTES
Pt states he has been taking medications for a uti.  Pt states he completed the medications and it was a week long course of antibiotics

## 2019-07-29 NOTE — INTERDISCIPLINARY ROUNDS
Interdisciplinary team rounds were held 7/29/2019 with the following team members:Care Management, Nursing, Nutrition, Pharmacy, Physician and Clinical Coordinator and the patient and spouse. Plan of care discussed. See clinical pathway and/or care plan for interventions and desired outcomes.

## 2019-07-29 NOTE — H&P
HOSPITALIST H&P  NAME:  Michael Nichols   Age:  44 y.o.  :   1970   MRN:   [id number]  PCP: Jana Adams MD  Treatment Team: Attending Provider: Cortney Nevarez MD; Primary Nurse: Mortimer Rushing, RN    Full Code     CC: Reason for admission is: weakness, ABD Pain, Chills, Vomitting     HPI:   Patient history was obtained from the ER provider prior to seeing the patient. Patient is a 44 y.o. male who presents to the ER c/o weakness, vomitting, ABD pain, and chills starting SAT. PT diagnosed with UTI 3 wks ago and complated 7 day PO antibiotic course. \"I got sick again on  Sat. \"  Wife present with pt at time of interview with Dr. Donna Schreiber in ER. Wife states symptoms happened after family trip to the [place]. C/O left flank pain but denies fevers or h/o kidney stones. Pt labs show hyperglycemia, metabolic acidosis with urinary ketones at time of presentation to ED. Pt also has WBC 20K. With signs of persistent UTI. ER lactic acid over 3.0. Expand All Collapse All        ROS:  All systems have been reviewed and are negative except as stated in HPI or elsewhere.       Past Medical History:   Diagnosis Date    Chronic back pain     [facility] [address]     DM (diabetes mellitus) ([facility] [address] 2013    GERD (gastroesophageal reflux disease)     HTN (hypertension)     HTN (hypertension) 2013    Snoring       Past Surgical History:   Procedure Laterality Date    HX ORTHOPAEDIC      right fore foot    HX SEPTOPLASTY        Social History     Tobacco Use    Smoking status: Never Smoker    Smokeless tobacco: Never Used   Substance Use Topics    Alcohol use: No      Family History   Problem Relation Age of Onset    Stroke Mother     Heart Disease Father        FH Reviewed and non-contributory to admitting diagnosis    Allergies   Allergen Reactions    Influenza Virus Vaccine, Specific Nausea and Vomiting      Prior to Admission Medications   Prescriptions Last Dose Informant Patient Reported? Taking? ACCU-CHEK KAYE PLUS METER misc   Yes No   ACCU-CHEK KAYE PLUS TEST STRP strip   Yes No   Aspirin, Buffered 81 mg Tab   Yes No   Sig: Take  by mouth. Brompheniramine-Pseudoeph-DM (BROMFED DM) 2-30-10 mg/5 mL syrup   No No   Sig: Take 5 mL by mouth four (4) times daily as needed. albuterol (PROAIR HFA) 90 mcg/actuation inhaler   No No   Sig: Take 1 Puff by inhalation every four (4) hours as needed for Wheezing. fluticasone (FLONASE) 50 mcg/actuation nasal spray   No No   Sig: Take 2 sprays in each nostril daily   lidocaine (LIDODERM) 5 %(700 mg/patch)   Yes No   Si Patch by TransDERmal route every twenty-four (24) hours. meloxicam (MOBIC) 7.5 mg tablet   Yes No   Sig: Take  by mouth daily. metFORMIN (GLUCOPHAGE) 1,000 mg tablet   No No   Sig: Take 1 Tab by mouth two (2) times daily (with meals) for 60 days. Indications: type 2 diabetes mellitus   mupirocin (BACTROBAN) 2 % ointment   No No   Sig: Apply  to affected area daily. mupirocin calcium (BACTROBAN) 2 % nasal ointment   No No   Sig: by Both Nostrils route two (2) times a day. pioglitazone (ACTOS) 15 mg tablet   No No   Sig: Take 1 tablet by mouth daily. pravastatin (PRAVACHOL) 10 mg tablet   No No   Sig: Take 1 Tab by mouth nightly. sertraline (ZOLOFT) 50 mg tablet   Yes No      Facility-Administered Medications: None         Objective:     No intake or output data in the 24 hours ending 19 2104   Temp (24hrs), Av.6 °F (36.4 °C), Min:97.6 °F (36.4 °C), Max:97.6 °F (36.4 °C)        Body mass index is 25.1 kg/m². Patient Vitals for the past 24 hrs:   Temp Pulse Resp BP   19 1845 97.6 °F (36.4 °C) (!) 112 26 151/84     Physical Exam:    General:    WD and WN, No apparent distress. Head:   Normocephalic, without obvious abnormality, atraumatic.   Eyes:  PERRL; EOMI; sclera normal/non-icteric  ENT:  Hearing is normal.  Oropharynx is clear with tacky mucous membranes   Resp:    Clear to auscultation bilaterally. No Wheezing or Rhonchi. Resp are even and unlabored  Heart[de-identified]  Regular rate and rhythm,  no murmur,   No LE edema  Abdomen:   Soft, TTP left flank CVA region. Not distended. Bowel sounds normal. + Vol guarding w/o                         rebound  Musc/SK: Muscle strength is good and tone normal; No cyanosis. No clubbing  Skin:     Texture, turgor normal. No significant rashes or lesions. Capillary refill < 2 sec  Neurologic: CN II - XII are grossly intact - Eye exam as noted above  Psych: Alert and oriented x 4;  Judgement and insight are normal     Data Review:   Recent Results (from the past 24 hour(s))   POC LACTIC ACID    Collection Time: 07/28/19  6:57 PM   Result Value Ref Range    Lactic Acid (POC) 3.35 (H) 0.5 - 1.9 mmol/L   URINE MICROSCOPIC    Collection Time: 07/28/19  7:01 PM   Result Value Ref Range    WBC >100 (H) 0 /hpf    RBC 20-50 0 /hpf    Epithelial cells 0-3 0 /hpf    Bacteria 0 0 /hpf    Casts 0-3 0 /lpf   CBC WITH AUTOMATED DIFF    Collection Time: 07/28/19  7:33 PM   Result Value Ref Range    WBC 20.5 (H) 4.3 - 11.1 K/uL    RBC 4.76 4.23 - 5.6 M/uL    HGB 12.6 (L) 13.6 - 17.2 g/dL    HCT 41.6 41.1 - 50.3 %    MCV 87.4 79.6 - 97.8 FL    MCH 26.5 26.1 - 32.9 PG    MCHC 30.3 (L) 31.4 - 35.0 g/dL    RDW 14.7 (H) 11.9 - 14.6 %    PLATELET 003 (H) 828 - 450 K/uL    MPV 9.6 9.4 - 12.3 FL    ABSOLUTE NRBC 0.00 0.0 - 0.2 K/uL    NEUTROPHILS 80 (H) 47 - 75 %    BAND NEUTROPHILS 6 0 - 10 %    LYMPHOCYTES 8 (L) 16 - 44 %    MONOCYTES 6 3 - 9 %    ABS. NEUTROPHILS 17.7 (H) 1.7 - 8.2 K/UL    ABS. LYMPHOCYTES 1.6 0.5 - 4.6 K/UL    ABS.  MONOCYTES 1.2 0.1 - 1.3 K/UL    RBC COMMENTS SLIGHT  ANISOCYTOSIS + POIKILOCYTOSIS        WBC COMMENTS SLIGHT      PLATELET COMMENTS INCREASED      DF MANUAL     METABOLIC PANEL, COMPREHENSIVE    Collection Time: 07/28/19  7:33 PM   Result Value Ref Range    Sodium 128 (L) 136 - 145 mmol/L    Potassium 4.8 3.5 - 5.1 mmol/L    Chloride 94 (L) 98 - 107 mmol/L    CO2 6 (LL) 21 - 32 mmol/L    Anion gap 28 (H) 7 - 16 mmol/L    Glucose 539 (HH) 65 - 100 mg/dL    BUN 25 (H) 6 - 23 MG/DL    Creatinine 1.49 0.8 - 1.5 MG/DL    GFR est AA >60 >60 ml/min/1.73m2    GFR est non-AA 53 (L) >60 ml/min/1.73m2    Calcium 10.1 8.3 - 10.4 MG/DL    Bilirubin, total 0.6 0.2 - 1.1 MG/DL    ALT (SGPT) 16 12 - 65 U/L    AST (SGOT) 9 (L) 15 - 37 U/L    Alk. phosphatase 131 50 - 136 U/L    Protein, total 10.7 (H) 6.3 - 8.2 g/dL    Albumin 3.3 (L) 3.5 - 5.0 g/dL    Globulin 7.4 (H) 2.3 - 3.5 g/dL    A-G Ratio 0.4 (L) 1.2 - 3.5     PROCALCITONIN    Collection Time: 07/28/19  7:33 PM   Result Value Ref Range    Procalcitonin 0.8 ng/mL   POC G3    Collection Time: 07/28/19  8:10 PM   Result Value Ref Range    Device: ROOM AIR      pH (POC) 7.210 (LL) 7.35 - 7.45      pCO2 (POC) 11.6 (LL) 35 - 45 MMHG    pO2 (POC) 136 (H) 75 - 100 MMHG    HCO3 (POC) 4.6 (L) 22 - 26 MMOL/L    sO2 (POC) 99 (H) 95 - 98 %    Base deficit (POC) 20 mmol/L    Allens test (POC) YES      Site RIGHT RADIAL      Patient temp. 98.6      Specimen type (POC) ARTERIAL      Performed by Dana Fagan     CO2, POC <5 MMOL/L    Respiratory comment: PhysicianNotified     COLLECT TIME 2,005       CXR Results  (Last 48 hours)               07/28/19 2005  XR CHEST PORT Final result    Impression:  IMPRESSION:       1. No pulmonary consolidation. Narrative:  Portable chest xray         COMPARISON: None. CLINICAL HISTORY: Meets SIRS criterion       FINDINGS:       No focal pulmonary consolidation, pleural effusion or pneumothorax. No pulmonary   edema. Cardiac mediastinal contour is within normal limits. Surrounding bones   are unremarkable. CT Results  (Last 48 hours)    None              Assessment and Plan:      Active Hospital Problems    Diagnosis Date Noted    DKA (diabetic ketoacidoses) ([facility] 07/28/2019     Active Problems:    DKA (diabetic ketoacidoses) ([facility] (7/28/2019) · PLAN General  DKA  · Admit to ICU on DKA protocol. 2.5 L NS given in ED. Give additional 1L wide open NS before starting DKA protocol. Sepsis due to UTI  · 2gm IV rocephin, Blood/Urine Cultures done in ED. Repeat lactic acid level. Tylenol/morphine PRN pain. CT ABD to eval for pyelonephritis. · Cont appropriate home meds (see MAR)  · Control symptoms (pain, n/v, fever, etc)  · Monitor appropriate labs   · DVT prophylaxis:  heparin  · Code status: Full;  HCPOA: Wife   · FEN NPO except for ice chips till anion gap closes. · Risk: high  · Anticipated DC needs: none  · Estimated LOS:  Greater than 2 midnights  · Plans discussed with patient and/or caregiver; questions answered.       Med records reviewed if applicable; findings:     Critical care time if applicable:      Signed By: Lokesh Currie MD     July 28, 2019

## 2019-07-29 NOTE — ROUTINE PROCESS
TRANSFER - OUT REPORT:    Verbal report given to Cassandra Cortez on Tiffani Stevens  being transferred to [facility] for routine progression of care       Report consisted of patients Situation, Background, Assessment and   Recommendations(SBAR). Information from the following report(s) SBAR, ED Summary, [facility] and Recent Results was reviewed with the receiving nurse. Lines:   Peripheral IV 07/28/19 Right Hand (Active)   Site Assessment Clean, dry, & intact 7/28/2019  6:52 PM   Phlebitis Assessment 0 7/28/2019  6:52 PM   Infiltration Assessment 0 7/28/2019  6:52 PM   Dressing Status Clean, dry, & intact 7/28/2019  6:52 PM   Hub Color/Line Status Blue 7/28/2019  6:52 PM       Peripheral IV 07/28/19 Right External jugular (Active)   Site Assessment Clean, dry, & intact 7/28/2019  8:04 PM   Phlebitis Assessment 0 7/28/2019  8:04 PM   Infiltration Assessment 0 7/28/2019  8:04 PM   Dressing Status Clean, dry, & intact 7/28/2019  8:04 PM        Opportunity for questions and clarification was provided.       Patient transported with:   Monitor  Registered Nurse

## 2019-07-29 NOTE — PROGRESS NOTES
Hospitalist Note     Admit Date:  2019  7:18 PM   Name:  Michael Nichols   Age:  44 y.o.  :  1970   MRN:  [id number]   PCP:  Jana Adams MD  Treatment Team: Attending Provider: Anna Marie Paredes MD; Primary Nurse: Anna Smalls RN; Primary Nurse: Darrel Hernández, RN; Utilization Review: Isiah Day RN    HPI/Subjective:   Pt is a 43 y/o M with DM who presented to ER with weakness, chills, abd pain. Met sepsis criteria, suspected UTI. Found to be in DKA. Admitted to ICU.     - still with some L lateral abd pain, almost flank pain. Otherwise feeling OK. Tachycardia improved to 110s.   No fevers      Objective:     Patient Vitals for the past 24 hrs:   Temp Pulse Resp BP SpO2   19 0659 98.9 °F (37.2 °C) (!) 132 27 117/68 98 %   19 0630  (!) 133 24 110/67 97 %   19 0614  (!) 132  117/68 97 %   19 0559  (!) 132 (!) 32 114/67 97 %   19 0545  (!) 130 22 121/68 96 %   19 0530  (!) 127 30 124/70 97 %   19 0514  (!) 123 26 111/66 98 %   19 0459  (!) 125 20 124/66 99 %   19 0445  (!) 125 16 125/70 98 %   19 0430  (!) 134 26 115/71 98 %   19 0414  (!) 136 (!) 31 120/74 98 %   19 0359 98.1 °F (36.7 °C) (!) 128 26 126/80 99 %   19 0345  (!) 128 28 131/81 99 %   19 0330  (!) 127 12 138/84 100 %   19 0314  (!) 131 28 135/81 99 %   19 0259  (!) 134 28 128/75 98 %   19 0245  (!) 132 24 125/77 99 %   19 0230  (!) 125 30 116/70 (!) 89 %   19 0214  (!) 132 (!) 31 118/69 100 %   19 0159  (!) 135 25 115/66 99 %   19 0145  (!) 133 (!) 37 116/66 100 %   19 0130  (!) 129 (!) 31 115/67 100 %   19 0114  (!) 122 28 110/61 97 %   19 0104  (!) 144  125/69    19 0059  (!) 144 29 125/69 98 %   19 0045  (!) 135 (!) 33 135/81 100 %   19 0030  (!) 140 28 119/59 100 %   19 0015  (!) 137 30 117/61 100 %   19 0003  (!) 149 23 126/61 100 %   07/28/19 2345  (!) 150 28 128/71 95 %   07/28/19 2330  (!) 147 (!) 32 143/80 98 %   07/28/19 2315  (!) 142 (!) 34 168/86 100 %   07/28/19 2259  (!) 145 23 (!) 179/91 94 %   07/28/19 2254 97.8 °F (36.6 °C) (!) 138 26 173/88 98 %   07/28/19 2230  (!) 134 26 135/72 99 %   07/28/19 2215  (!) 119 25 (!) 158/106 100 %   07/28/19 2200  (!) 120 25 159/86 100 %   07/28/19 2144  (!) 108 25 (!) 177/96 100 %   07/28/19 2130  (!) 117 22 (!) 166/96 99 %   07/28/19 2115  (!) 123 27 161/81 100 %   07/28/19 2100  (!) 146 30 167/83 98 %   07/28/19 2045  (!) 122 25 165/80 100 %   07/28/19 2030  (!) 118 23 (!) 153/91 100 %   07/28/19 2015  (!) 119 26 146/87 99 %   07/28/19 1959  (!) 114 22 158/81 100 %   07/28/19 1845 97.6 °F (36.4 °C) (!) 112 26 151/84      Oxygen Therapy  O2 Sat (%): 98 % (07/29/19 0659)  Pulse via Oximetry: 133 beats per minute (07/29/19 0659)  O2 Device: Room air (07/29/19 0732)      Intake/Output Summary (Last 24 hours) at 7/29/2019 0919  Last data filed at 7/29/2019 0732  Gross per 24 hour   Intake 4227.69 ml   Output 1025 ml   Net 3202.69 ml       *Note that automatically entered I/Os may not be accurate; dependent on patient compliance with collection and accurate  by techs. General:    Well nourished. Alert. CV:   Tachy, reg. No murmur, rub, or gallop. Lungs:   CTAB. No wheezing, rhonchi, or rales. Abdomen:   Soft, nondistended. Mild TTP L lateral abd  Extremities: Warm and dry. No cyanosis or edema. Skin:     No rashes or jaundice.    Neuro:  No gross focal deficits    Data Review:  I have reviewed all labs, meds, and studies from the last 24 hours:    Recent Results (from the past 24 hour(s))   POC LACTIC ACID    Collection Time: 07/28/19  6:57 PM   Result Value Ref Range    Lactic Acid (POC) 3.35 (H) 0.5 - 1.9 mmol/L   URINE MICROSCOPIC    Collection Time: 07/28/19  7:01 PM   Result Value Ref Range    WBC >100 (H) 0 /hpf    RBC 20-50 0 /hpf    Epithelial cells 0-3 0 /hpf    Bacteria 0 0 /hpf    Casts 0-3 0 /lpf   CULTURE, URINE    Collection Time: 07/28/19  7:01 PM   Result Value Ref Range    Special Requests: NO SPECIAL REQUESTS      Culture result:        NO GROWTH AFTER SHORT PERIOD OF INCUBATION. FURTHER RESULTS TO FOLLOW AFTER OVERNIGHT INCUBATION. CBC WITH AUTOMATED DIFF    Collection Time: 07/28/19  7:33 PM   Result Value Ref Range    WBC 20.5 (H) 4.3 - 11.1 K/uL    RBC 4.76 4.23 - 5.6 M/uL    HGB 12.6 (L) 13.6 - 17.2 g/dL    HCT 41.6 41.1 - 50.3 %    MCV 87.4 79.6 - 97.8 FL    MCH 26.5 26.1 - 32.9 PG    MCHC 30.3 (L) 31.4 - 35.0 g/dL    RDW 14.7 (H) 11.9 - 14.6 %    PLATELET 876 (H) 446 - 450 K/uL    MPV 9.6 9.4 - 12.3 FL    ABSOLUTE NRBC 0.00 0.0 - 0.2 K/uL    NEUTROPHILS 80 (H) 47 - 75 %    BAND NEUTROPHILS 6 0 - 10 %    LYMPHOCYTES 8 (L) 16 - 44 %    MONOCYTES 6 3 - 9 %    ABS. NEUTROPHILS 17.7 (H) 1.7 - 8.2 K/UL    ABS. LYMPHOCYTES 1.6 0.5 - 4.6 K/UL    ABS. MONOCYTES 1.2 0.1 - 1.3 K/UL    RBC COMMENTS SLIGHT  ANISOCYTOSIS + POIKILOCYTOSIS        WBC COMMENTS SLIGHT      PLATELET COMMENTS INCREASED      DF MANUAL     METABOLIC PANEL, COMPREHENSIVE    Collection Time: 07/28/19  7:33 PM   Result Value Ref Range    Sodium 128 (L) 136 - 145 mmol/L    Potassium 4.8 3.5 - 5.1 mmol/L    Chloride 94 (L) 98 - 107 mmol/L    CO2 6 (LL) 21 - 32 mmol/L    Anion gap 28 (H) 7 - 16 mmol/L    Glucose 539 (HH) 65 - 100 mg/dL    BUN 25 (H) 6 - 23 MG/DL    Creatinine 1.49 0.8 - 1.5 MG/DL    GFR est AA >60 >60 ml/min/1.73m2    GFR est non-AA 53 (L) >60 ml/min/1.73m2    Calcium 10.1 8.3 - 10.4 MG/DL    Bilirubin, total 0.6 0.2 - 1.1 MG/DL    ALT (SGPT) 16 12 - 65 U/L    AST (SGOT) 9 (L) 15 - 37 U/L    Alk.  phosphatase 131 50 - 136 U/L    Protein, total 10.7 (H) 6.3 - 8.2 g/dL    Albumin 3.3 (L) 3.5 - 5.0 g/dL    Globulin 7.4 (H) 2.3 - 3.5 g/dL    A-G Ratio 0.4 (L) 1.2 - 3.5     PROCALCITONIN    Collection Time: 07/28/19  7:33 PM   Result Value Ref Range Procalcitonin 0.8 ng/mL   CULTURE, BLOOD    Collection Time: 07/28/19  7:45 PM   Result Value Ref Range    Special Requests: RIGHT  JUGULAR VEIN        GRAM STAIN GRAM POS COCCI IN CLUSTERS      GRAM STAIN ANAEROBIC BOTTLE POSITIVE      GRAM STAIN        RESULTS VERIFIED, PHONED TO AND READ BACK BY ALFONSO [address] RN AT 4074 ON [id number] MT    Culture result: CULTURE IN William Lowery UPDATES TO FOLLOW      Culture result: (A)       STAPHYLOCOCCUS AUREUS DETECTED Test Performed by Multiplex PCR    Culture result:        RESULTS VERIFIED, PHONED TO AND READ BACK BY  ALFONSO [address] RN AT 9563 TW [id number] MT     EKG, 12 LEAD, INITIAL    Collection Time: 07/28/19  7:45 PM   Result Value Ref Range    Ventricular Rate 121 BPM    Atrial Rate 121 BPM    P-R Interval 164 ms    QRS Duration 88 ms    Q-T Interval 294 ms    QTC Calculation (Bezet) 417 ms    Calculated P Axis 65 degrees    Calculated R Axis 46 degrees    Calculated T Axis 76 degrees    Diagnosis       poor baseline- artifact  Sinus tachycardia  Otherwise normal ECG  When compared with ECG of 16-OCT-2015 20:54,  Questionable change in QRS axis  Confirmed by Kahlil Gavin MD (), YVETTE TREJO ([id number]) on 7/29/2019 8:32:36 AM     POC G3    Collection Time: 07/28/19  8:10 PM   Result Value Ref Range    Device: ROOM AIR      pH (POC) 7.210 (LL) 7.35 - 7.45      pCO2 (POC) 11.6 (LL) 35 - 45 MMHG    pO2 (POC) 136 (H) 75 - 100 MMHG    HCO3 (POC) 4.6 (L) 22 - 26 MMOL/L    sO2 (POC) 99 (H) 95 - 98 %    Base deficit (POC) 20 mmol/L    Allens test (POC) YES      Site RIGHT RADIAL      Patient temp.  98.6      Specimen type (POC) ARTERIAL      Performed by [facility]     CO2, POC <5 MMOL/L    Respiratory comment: PhysicianNotified     COLLECT TIME 2,005     GLUCOSE, POC    Collection Time: 07/28/19  9:06 PM   Result Value Ref Range    Glucose (POC) 504 (HH) 65 - 100 mg/dL   GLUCOSE, POC    Collection Time: 07/28/19 10:30 PM   Result Value Ref Range    Glucose (POC) 439 (H) 65 - 851 mg/dL   METABOLIC PANEL, BASIC    Collection Time: 07/28/19 11:39 PM   Result Value Ref Range    Sodium 137 136 - 145 mmol/L    Potassium 4.3 3.5 - 5.1 mmol/L    Chloride 106 98 - 107 mmol/L    CO2 9 (LL) 21 - 32 mmol/L    Anion gap 22 (H) 7 - 16 mmol/L    Glucose 342 (H) 65 - 100 mg/dL    BUN 21 6 - 23 MG/DL    Creatinine 1.36 0.8 - 1.5 MG/DL    GFR est AA >60 >60 ml/min/1.73m2    GFR est non-AA 59 (L) >60 ml/min/1.73m2    Calcium 9.5 8.3 - 10.4 MG/DL   MAGNESIUM    Collection Time: 07/28/19 11:39 PM   Result Value Ref Range    Magnesium 2.2 1.8 - 2.4 mg/dL   GLUCOSE, POC    Collection Time: 07/28/19 11:41 PM   Result Value Ref Range    Glucose (POC) 307 (H) 65 - 100 mg/dL   GLUCOSE, POC    Collection Time: 07/29/19 12:42 AM   Result Value Ref Range    Glucose (POC) 274 (H) 65 - 100 mg/dL   GLUCOSE, POC    Collection Time: 07/29/19  1:49 AM   Result Value Ref Range    Glucose (POC) 237 (H) 65 - 100 mg/dL   GLUCOSE, POC    Collection Time: 07/29/19  2:52 AM   Result Value Ref Range    Glucose (POC) 191 (H) 65 - 577 mg/dL   METABOLIC PANEL, BASIC    Collection Time: 07/29/19  3:33 AM   Result Value Ref Range    Sodium 142 136 - 145 mmol/L    Potassium 4.4 3.5 - 5.1 mmol/L    Chloride 113 (H) 98 - 107 mmol/L    CO2 13 (L) 21 - 32 mmol/L    Anion gap 16 7 - 16 mmol/L    Glucose 212 (H) 65 - 100 mg/dL    BUN 19 6 - 23 MG/DL    Creatinine 1.08 0.8 - 1.5 MG/DL    GFR est AA >60 >60 ml/min/1.73m2    GFR est non-AA >60 >60 ml/min/1.73m2    Calcium 8.7 8.3 - 10.4 MG/DL   MAGNESIUM    Collection Time: 07/29/19  3:33 AM   Result Value Ref Range    Magnesium 2.0 1.8 - 2.4 mg/dL   CBC WITH AUTOMATED DIFF    Collection Time: 07/29/19  3:33 AM   Result Value Ref Range    WBC 11.6 (H) 4.3 - 11.1 K/uL    RBC 4.25 4.23 - 5.6 M/uL    HGB 11.1 (L) 13.6 - 17.2 g/dL    HCT 35.8 (L) 41.1 - 50.3 %    MCV 84.2 79.6 - 97.8 FL    MCH 26.1 26.1 - 32.9 PG    MCHC 31.0 (L) 31.4 - 35.0 g/dL    RDW 14.6 11.9 - 14.6 %    PLATELET 069 909  158 K/uL    MPV 9.4 9.4 - 12.3 FL    ABSOLUTE NRBC 0.00 0.0 - 0.2 K/uL    NEUTROPHILS 86 (H) 43 - 78 %    LYMPHOCYTES 4 (L) 13 - 44 %    MONOCYTES 9 4.0 - 12.0 %    EOSINOPHILS 0 (L) 0.5 - 7.8 %    BASOPHILS 0 0.0 - 2.0 %    IMMATURE GRANULOCYTES 1 0.0 - 5.0 %    ABS. NEUTROPHILS 10.0 (H) 1.7 - 8.2 K/UL    ABS. LYMPHOCYTES 0.5 0.5 - 4.6 K/UL    ABS. MONOCYTES 1.0 0.1 - 1.3 K/UL    ABS. EOSINOPHILS 0.0 0.0 - 0.8 K/UL    ABS. BASOPHILS 0.0 0.0 - 0.2 K/UL    ABS. IMM.  GRANS. 0.1 0.0 - 0.5 K/UL    RBC COMMENTS SLIGHT  ANISOCYTOSIS + POIKILOCYTOSIS        WBC COMMENTS Result Confirmed By Smear      PLATELET COMMENTS ADEQUATE      DF AUTOMATED     LACTIC ACID    Collection Time: 07/29/19  3:33 AM   Result Value Ref Range    Lactic acid 1.6 0.4 - 2.0 MMOL/L   GLUCOSE, POC    Collection Time: 07/29/19  3:54 AM   Result Value Ref Range    Glucose (POC) 202 (H) 65 - 100 mg/dL   GLUCOSE, POC    Collection Time: 07/29/19  5:02 AM   Result Value Ref Range    Glucose (POC) 210 (H) 65 - 100 mg/dL   GLUCOSE, POC    Collection Time: 07/29/19  6:02 AM   Result Value Ref Range    Glucose (POC) 209 (H) 65 - 100 mg/dL   GLUCOSE, POC    Collection Time: 07/29/19  6:58 AM   Result Value Ref Range    Glucose (POC) 204 (H) 65 - 100 mg/dL   GLUCOSE, POC    Collection Time: 07/29/19  8:02 AM   Result Value Ref Range    Glucose (POC) 196 (H) 65 - 100 mg/dL   GLUCOSE, POC    Collection Time: 07/29/19  9:08 AM   Result Value Ref Range    Glucose (POC) 178 (H) 65 - 100 mg/dL        All Micro Results     Procedure Component Value Units Date/Time    CULTURE, BLOOD [[id number]]  (Abnormal) Collected:  07/28/19 1945    Order Status:  Completed Specimen:  Blood Updated:  07/29/19 0850     Special Requests: --        RIGHT  JUGULAR VEIN       GRAM STAIN       GRAM POS COCCI IN CLUSTERS            ANAEROBIC BOTTLE POSITIVE               RESULTS VERIFIED, PHONED TO AND READ BACK BY ALFONSO STANTON RN AT 0033 QG [id number] MT           Culture result:       CULTURE IN PROGRESS,FURTHER UPDATES TO FOLLOW                  STAPHYLOCOCCUS AUREUS DETECTED Test Performed by Multiplex PCR                  RESULTS VERIFIED, PHONED TO AND READ BACK BY  [facility] RN AT 6613 ON [id number] MT      CULTURE, URINE [[id number]] Collected:  07/28/19 1901    Order Status:  Completed Specimen:  Urine from Clean catch Updated:  07/29/19 0825     Special Requests: NO SPECIAL REQUESTS        Culture result:       NO GROWTH AFTER SHORT PERIOD OF INCUBATION. FURTHER RESULTS TO FOLLOW AFTER OVERNIGHT INCUBATION. CULTURE, BLOOD [[id number]] Collected:  07/28/19 2339    Order Status:  Completed Specimen:  Blood Updated:  07/29/19 0016          No results found for this visit on 07/28/19.     Current Meds:  Current Facility-Administered Medications   Medication Dose Route Frequency    albuterol (PROVENTIL VENTOLIN) nebulizer solution 2.5 mg  2.5 mg Nebulization Q4H PRN    brompheniramine-pseudoeph-DM (DIMETAPP) 2-30-10 mg/5 mL syrup 5 mL (Patient Supplied)  5 mL Oral QID PRN    fluticasone propionate (FLONASE) 50 mcg/actuation nasal spray 2 Spray  2 Spray Both Nostrils DAILY    pioglitazone (ACTOS) tablet 15 mg  15 mg Oral DAILY    pravastatin (PRAVACHOL) tablet 10 mg  10 mg Oral QHS    sertraline (ZOLOFT) tablet 50 mg  50 mg Oral DAILY    cefTRIAXone (ROCEPHIN) 2 g in 0.9% sodium chloride (MBP/ADV) 50 mL  2 g IntraVENous Q24H    sodium chloride (NS) flush 5-10 mL  5-10 mL IntraVENous PRN    insulin regular (NOVOLIN R, HUMULIN R) 100 Units in 0.9% sodium chloride 100 mL infusion  0-50 Units/hr IntraVENous TITRATE    dextrose 40% (GLUTOSE) oral gel 1 Tube  15 g Oral PRN    glucagon (GLUCAGEN) injection 1 mg  1 mg IntraMUSCular PRN    dextrose (D50W) injection syrg 12.5-25 g  25-50 mL IntraVENous PRN    sodium chloride (NS) flush 5-40 mL  5-40 mL IntraVENous Q8H    sodium chloride (NS) flush 5-40 mL  5-40 mL IntraVENous PRN    acetaminophen (TYLENOL) tablet 650 mg  650 mg Oral Q4H PRN  naloxone (NARCAN) injection 0.4 mg  0.4 mg IntraVENous PRN    diphenhydrAMINE (BENADRYL) injection 12.5 mg  12.5 mg IntraVENous Q4H PRN    ondansetron (ZOFRAN) injection 4 mg  4 mg IntraVENous Q4H PRN    heparin (porcine) injection 5,000 Units  5,000 Units SubCUTAneous Q8H    morphine injection 2 mg  2 mg IntraVENous Q4H PRN       Other Studies (last 24 hours):  Xr Chest Port    Result Date: 7/28/2019  Portable chest xray  COMPARISON: None. CLINICAL HISTORY: Meets SIRS criterion FINDINGS: No focal pulmonary consolidation, pleural effusion or pneumothorax. No pulmonary edema. Cardiac mediastinal contour is within normal limits. Surrounding bones are unremarkable. IMPRESSION: 1. No pulmonary consolidation. Assessment and Plan:     Hospital Problems as of 7/29/2019 Date Reviewed: 1/4/2017          Codes Class Noted - Resolved POA    Sepsis ([facility] ICD-10-CM: A41.9  ICD-9-CM: 038.9, 995.91  7/29/2019 - Present Yes        * (Principal) DKA (diabetic ketoacidoses) ([facility] ICD-10-CM: E13.10  ICD-9-CM: 250.10  7/28/2019 - Present Yes        HTN (hypertension) (Chronic) ICD-10-CM: I10  ICD-9-CM: 401.9  11/18/2013 - Present Yes              Plan:  DKA  -gap closed but cont insulin gtt until bicarb normal  -give bicarb tablets  -change IVF to D5 1/2 NS; Na climbing  -holding home meds. Says he has not missed doses. -check a1c  -NPO until DKA resolved    Sepsis  -GPC in 1/4 bottles but WBC got better on rocephin. Suspect UTI source based on UA.   Cont rocephin for now  -if GPC in more bottles may start vanc  -CT abd pending for abd pain    DC planning/Dispo:    -likely home when improved    Diet:  DIET NPO  DVT ppx:  lovenox    Signed:  Yasir Mejia MD

## 2019-07-29 NOTE — PROGRESS NOTES
Pt seen in CCU s/p admission DKA. Alert and oriented currently. Mumtaz Nielson, \"wife or CL wife\" at bedside. Confirms demographics. No needs for d/c voiced at present. CM will continue to follow for any d/c needs per MD.     Care Management Interventions  PCP Verified by CM: Yes(per pt has new PCP appointment already)  Mode of Transport at Discharge: Other (see comment)  Transition of Care Consult (CM Consult): Discharge Planning  Discharge Durable Medical Equipment: (glucometer)  Current Support Network: Own Home(states lives with his wife, Lynne Yeager [phone].  Unclear is legally  or CL. )  Confirm Follow Up Transport: Self  Plan discussed with Pt/Family/Caregiver: Yes  Freedom of Choice Offered: Yes  [employer] Information Provided?: (confirms [facility] - able to get rx)  Discharge Location  Discharge Placement: Home

## 2019-07-29 NOTE — PROGRESS NOTES
Discussed lab and imaging results with Dr. Avtar Davenport. Orders received for Echo. Patient to remain on glucostabilizer/insulin gtt until CO2 WNL.

## 2019-07-29 NOTE — PROGRESS NOTES
Initial visit made to patient and a prayer was provided. His wife, Gabo Villalpando was present. A  card was left. The patient was drifting in and out of sleep.         [employer]

## 2019-07-29 NOTE — PROGRESS NOTES
Bedside and Verbal shift change report given to Bart Lopez (oncoming nurse) by Arleen Gamez RN (offgoing nurse). Report included the following information SBAR, Kardex, Intake/Output, MAR, Recent Results and Cardiac Rhythm S. Tach. Insulin gtt rate verified.

## 2019-07-29 NOTE — PROGRESS NOTES
TRANSFER - IN REPORT:    Verbal report received from [address] [place] [address] on Rosa Echo  being received from ED for routine progression of care      Report consisted of patients Situation, Background, Assessment and   Recommendations(SBAR). Information from the following report(s) SBAR, Kardex, ED Summary, Intake/Output, MAR, Recent Results, Med Rec Status and Cardiac Rhythm Sinus tach was reviewed with the receiving nurse. Opportunity for questions and clarification was provided. Assessment completed upon patients arrival to unit and care assumed.

## 2019-07-30 PROBLEM — N10 ACUTE PYELONEPHRITIS: Status: ACTIVE | Noted: 2019-07-30

## 2019-07-30 PROBLEM — J98.4 PULMONARY CAVITARY LESION: Status: ACTIVE | Noted: 2019-07-30

## 2019-07-30 PROBLEM — A41.01 SEPSIS DUE TO METHICILLIN SUSCEPTIBLE STAPHYLOCOCCUS AUREUS (HCC): Status: ACTIVE | Noted: 2019-07-29

## 2019-07-30 LAB
ADMINISTERED INITIALS, ADMINIT: NORMAL
ANION GAP SERPL CALC-SCNC: 10 MMOL/L (ref 7–16)
ANION GAP SERPL CALC-SCNC: 11 MMOL/L (ref 7–16)
ANION GAP SERPL CALC-SCNC: 14 MMOL/L (ref 7–16)
BASOPHILS # BLD: 0.1 K/UL (ref 0–0.2)
BASOPHILS NFR BLD: 1 % (ref 0–2)
BUN SERPL-MCNC: 7 MG/DL (ref 6–23)
BUN SERPL-MCNC: 9 MG/DL (ref 6–23)
BUN SERPL-MCNC: 9 MG/DL (ref 6–23)
CALCIUM SERPL-MCNC: 8.5 MG/DL (ref 8.3–10.4)
CALCIUM SERPL-MCNC: 8.7 MG/DL (ref 8.3–10.4)
CALCIUM SERPL-MCNC: 8.7 MG/DL (ref 8.3–10.4)
CHLORIDE SERPL-SCNC: 102 MMOL/L (ref 98–107)
CHLORIDE SERPL-SCNC: 107 MMOL/L (ref 98–107)
CHLORIDE SERPL-SCNC: 107 MMOL/L (ref 98–107)
CO2 SERPL-SCNC: 16 MMOL/L (ref 21–32)
CO2 SERPL-SCNC: 18 MMOL/L (ref 21–32)
CO2 SERPL-SCNC: 18 MMOL/L (ref 21–32)
CREAT SERPL-MCNC: 0.65 MG/DL (ref 0.8–1.5)
CREAT SERPL-MCNC: 0.7 MG/DL (ref 0.8–1.5)
CREAT SERPL-MCNC: 0.73 MG/DL (ref 0.8–1.5)
D50 ADMINISTERED, D50ADM: 0 ML
D50 ORDER, D50ORD: 0 ML
DIFFERENTIAL METHOD BLD: ABNORMAL
EOSINOPHIL # BLD: 0.1 K/UL (ref 0–0.8)
EOSINOPHIL NFR BLD: 1 % (ref 0.5–7.8)
ERYTHROCYTE [DISTWIDTH] IN BLOOD BY AUTOMATED COUNT: 14.6 % (ref 11.9–14.6)
EST. AVERAGE GLUCOSE BLD GHB EST-MCNC: 295 MG/DL
GLSCOM COMMENTS: NORMAL
GLUCOSE BLD STRIP.AUTO-MCNC: 142 MG/DL (ref 65–100)
GLUCOSE BLD STRIP.AUTO-MCNC: 148 MG/DL (ref 65–100)
GLUCOSE BLD STRIP.AUTO-MCNC: 151 MG/DL (ref 65–100)
GLUCOSE BLD STRIP.AUTO-MCNC: 151 MG/DL (ref 65–100)
GLUCOSE BLD STRIP.AUTO-MCNC: 153 MG/DL (ref 65–100)
GLUCOSE BLD STRIP.AUTO-MCNC: 153 MG/DL (ref 65–100)
GLUCOSE BLD STRIP.AUTO-MCNC: 159 MG/DL (ref 65–100)
GLUCOSE BLD STRIP.AUTO-MCNC: 160 MG/DL (ref 65–100)
GLUCOSE BLD STRIP.AUTO-MCNC: 168 MG/DL (ref 65–100)
GLUCOSE BLD STRIP.AUTO-MCNC: 168 MG/DL (ref 65–100)
GLUCOSE BLD STRIP.AUTO-MCNC: 184 MG/DL (ref 65–100)
GLUCOSE BLD STRIP.AUTO-MCNC: 248 MG/DL (ref 65–100)
GLUCOSE BLD STRIP.AUTO-MCNC: 262 MG/DL (ref 65–100)
GLUCOSE SERPL-MCNC: 170 MG/DL (ref 65–100)
GLUCOSE SERPL-MCNC: 174 MG/DL (ref 65–100)
GLUCOSE SERPL-MCNC: 290 MG/DL (ref 65–100)
GLUCOSE, GLC: 142 MG/DL
GLUCOSE, GLC: 151 MG/DL
GLUCOSE, GLC: 153 MG/DL
GLUCOSE, GLC: 153 MG/DL
GLUCOSE, GLC: 159 MG/DL
GLUCOSE, GLC: 160 MG/DL
GLUCOSE, GLC: 168 MG/DL
GLUCOSE, GLC: 168 MG/DL
GLUCOSE, GLC: 184 MG/DL
HBA1C MFR BLD: 11.9 % (ref 4.8–6)
HCT VFR BLD AUTO: 31.3 % (ref 41.1–50.3)
HGB BLD-MCNC: 10.2 G/DL (ref 13.6–17.2)
HIGH TARGET, HITG: 180 MG/DL
IMM GRANULOCYTES # BLD AUTO: 0.1 K/UL (ref 0–0.5)
IMM GRANULOCYTES NFR BLD AUTO: 1 % (ref 0–5)
INSULIN ADMINSTERED, INSADM: 4.9 UNITS/HOUR
INSULIN ADMINSTERED, INSADM: 6.4 UNITS/HOUR
INSULIN ADMINSTERED, INSADM: 6.5 UNITS/HOUR
INSULIN ADMINSTERED, INSADM: 6.5 UNITS/HOUR
INSULIN ADMINSTERED, INSADM: 6.9 UNITS/HOUR
INSULIN ADMINSTERED, INSADM: 7 UNITS/HOUR
INSULIN ADMINSTERED, INSADM: 7.6 UNITS/HOUR
INSULIN ADMINSTERED, INSADM: 7.6 UNITS/HOUR
INSULIN ADMINSTERED, INSADM: 8.7 UNITS/HOUR
INSULIN ORDER, INSORD: 4.9 UNITS/HOUR
INSULIN ORDER, INSORD: 6.4 UNITS/HOUR
INSULIN ORDER, INSORD: 6.5 UNITS/HOUR
INSULIN ORDER, INSORD: 6.5 UNITS/HOUR
INSULIN ORDER, INSORD: 6.9 UNITS/HOUR
INSULIN ORDER, INSORD: 7 UNITS/HOUR
INSULIN ORDER, INSORD: 7.6 UNITS/HOUR
INSULIN ORDER, INSORD: 7.6 UNITS/HOUR
INSULIN ORDER, INSORD: 8.7 UNITS/HOUR
LOW TARGET, LOT: 140 MG/DL
LYMPHOCYTES # BLD: 0.8 K/UL (ref 0.5–4.6)
LYMPHOCYTES NFR BLD: 9 % (ref 13–44)
MAGNESIUM SERPL-MCNC: 1.8 MG/DL (ref 1.8–2.4)
MAGNESIUM SERPL-MCNC: 1.9 MG/DL (ref 1.8–2.4)
MCH RBC QN AUTO: 26.4 PG (ref 26.1–32.9)
MCHC RBC AUTO-ENTMCNC: 32.6 G/DL (ref 31.4–35)
MCV RBC AUTO: 81.1 FL (ref 79.6–97.8)
MINUTES UNTIL NEXT BG, NBG: 60 MIN
MONOCYTES # BLD: 0.8 K/UL (ref 0.1–1.3)
MONOCYTES NFR BLD: 9 % (ref 4–12)
MULTIPLIER, MUL: 0.06
MULTIPLIER, MUL: 0.07
NEUTS SEG # BLD: 7.3 K/UL (ref 1.7–8.2)
NEUTS SEG NFR BLD: 80 % (ref 43–78)
NRBC # BLD: 0 K/UL (ref 0–0.2)
ORDER INITIALS, ORDINIT: NORMAL
PLATELET # BLD AUTO: 284 K/UL (ref 150–450)
PMV BLD AUTO: 9.5 FL (ref 9.4–12.3)
POTASSIUM SERPL-SCNC: 3.3 MMOL/L (ref 3.5–5.1)
POTASSIUM SERPL-SCNC: 3.4 MMOL/L (ref 3.5–5.1)
POTASSIUM SERPL-SCNC: 3.9 MMOL/L (ref 3.5–5.1)
POTASSIUM SERPL-SCNC: 4.3 MMOL/L (ref 3.5–5.1)
RBC # BLD AUTO: 3.86 M/UL (ref 4.23–5.6)
SODIUM SERPL-SCNC: 132 MMOL/L (ref 136–145)
SODIUM SERPL-SCNC: 135 MMOL/L (ref 136–145)
SODIUM SERPL-SCNC: 136 MMOL/L (ref 136–145)
WBC # BLD AUTO: 9.1 K/UL (ref 4.3–11.1)

## 2019-07-30 PROCEDURE — 74011250637 HC RX REV CODE- 250/637: Performed by: INTERNAL MEDICINE

## 2019-07-30 PROCEDURE — 74011636637 HC RX REV CODE- 636/637: Performed by: INTERNAL MEDICINE

## 2019-07-30 PROCEDURE — C8929 TTE W OR WO FOL WCON,DOPPLER: HCPCS

## 2019-07-30 PROCEDURE — 83735 ASSAY OF MAGNESIUM: CPT

## 2019-07-30 PROCEDURE — 85025 COMPLETE CBC W/AUTO DIFF WBC: CPT

## 2019-07-30 PROCEDURE — 87389 HIV-1 AG W/HIV-1&-2 AB AG IA: CPT

## 2019-07-30 PROCEDURE — 80048 BASIC METABOLIC PNL TOTAL CA: CPT

## 2019-07-30 PROCEDURE — 65660000000 HC RM CCU STEPDOWN

## 2019-07-30 PROCEDURE — 77030020253 HC SOL INJ D545NS .05 DEX .45 SAL

## 2019-07-30 PROCEDURE — 74011250636 HC RX REV CODE- 250/636: Performed by: INTERNAL MEDICINE

## 2019-07-30 PROCEDURE — 74011000258 HC RX REV CODE- 258: Performed by: INTERNAL MEDICINE

## 2019-07-30 PROCEDURE — 83036 HEMOGLOBIN GLYCOSYLATED A1C: CPT

## 2019-07-30 PROCEDURE — 87205 SMEAR GRAM STAIN: CPT

## 2019-07-30 PROCEDURE — 87040 BLOOD CULTURE FOR BACTERIA: CPT

## 2019-07-30 PROCEDURE — 74011000250 HC RX REV CODE- 250: Performed by: INTERNAL MEDICINE

## 2019-07-30 PROCEDURE — 84132 ASSAY OF SERUM POTASSIUM: CPT

## 2019-07-30 PROCEDURE — 36415 COLL VENOUS BLD VENIPUNCTURE: CPT

## 2019-07-30 PROCEDURE — 99223 1ST HOSP IP/OBS HIGH 75: CPT | Performed by: INTERNAL MEDICINE

## 2019-07-30 PROCEDURE — 86803 HEPATITIS C AB TEST: CPT

## 2019-07-30 RX ORDER — POTASSIUM CHLORIDE 20 MEQ/1
20 TABLET, EXTENDED RELEASE ORAL 2 TIMES DAILY
Status: COMPLETED | OUTPATIENT
Start: 2019-07-30 | End: 2019-07-30

## 2019-07-30 RX ORDER — SODIUM BICARBONATE 650 MG/1
650 TABLET ORAL 3 TIMES DAILY
Status: COMPLETED | OUTPATIENT
Start: 2019-07-30 | End: 2019-07-31

## 2019-07-30 RX ORDER — METOPROLOL TARTRATE 50 MG/1
50 TABLET ORAL EVERY 12 HOURS
Status: COMPLETED | OUTPATIENT
Start: 2019-07-30 | End: 2019-07-31

## 2019-07-30 RX ORDER — POTASSIUM CHLORIDE 1.5 G/1.77G
40 POWDER, FOR SOLUTION ORAL 2 TIMES DAILY
Status: DISCONTINUED | OUTPATIENT
Start: 2019-07-30 | End: 2019-07-30

## 2019-07-30 RX ORDER — INSULIN LISPRO 100 [IU]/ML
INJECTION, SOLUTION INTRAVENOUS; SUBCUTANEOUS
Status: DISCONTINUED | OUTPATIENT
Start: 2019-07-30 | End: 2019-07-30 | Stop reason: SDUPTHER

## 2019-07-30 RX ORDER — POTASSIUM CHLORIDE 20 MEQ/1
40 TABLET, EXTENDED RELEASE ORAL ONCE
Status: COMPLETED | OUTPATIENT
Start: 2019-07-30 | End: 2019-07-30

## 2019-07-30 RX ORDER — CEFAZOLIN SODIUM/WATER 2 G/20 ML
2 SYRINGE (ML) INTRAVENOUS EVERY 8 HOURS
Status: DISCONTINUED | OUTPATIENT
Start: 2019-07-30 | End: 2019-08-07 | Stop reason: HOSPADM

## 2019-07-30 RX ORDER — METOPROLOL TARTRATE 25 MG/1
25 TABLET, FILM COATED ORAL EVERY 12 HOURS
Status: DISCONTINUED | OUTPATIENT
Start: 2019-07-30 | End: 2019-07-30

## 2019-07-30 RX ORDER — INSULIN LISPRO 100 [IU]/ML
0-10 INJECTION, SOLUTION INTRAVENOUS; SUBCUTANEOUS
Status: DISCONTINUED | OUTPATIENT
Start: 2019-07-30 | End: 2019-08-04

## 2019-07-30 RX ORDER — POTASSIUM CHLORIDE 20 MEQ/1
40 TABLET, EXTENDED RELEASE ORAL
Status: DISCONTINUED | OUTPATIENT
Start: 2019-07-30 | End: 2019-07-30

## 2019-07-30 RX ORDER — INSULIN GLARGINE 100 [IU]/ML
50 INJECTION, SOLUTION SUBCUTANEOUS DAILY
Status: DISCONTINUED | OUTPATIENT
Start: 2019-07-30 | End: 2019-08-01

## 2019-07-30 RX ORDER — POTASSIUM CHLORIDE 14.9 MG/ML
20 INJECTION INTRAVENOUS
Status: COMPLETED | OUTPATIENT
Start: 2019-07-30 | End: 2019-07-30

## 2019-07-30 RX ADMIN — SODIUM CHLORIDE 7.6 UNITS/HR: 900 INJECTION, SOLUTION INTRAVENOUS at 04:18

## 2019-07-30 RX ADMIN — Medication 10 ML: at 21:11

## 2019-07-30 RX ADMIN — POTASSIUM CHLORIDE 20 MEQ: 200 INJECTION, SOLUTION INTRAVENOUS at 05:32

## 2019-07-30 RX ADMIN — ONDANSETRON 4 MG: 2 INJECTION INTRAMUSCULAR; INTRAVENOUS at 17:15

## 2019-07-30 RX ADMIN — METOPROLOL TARTRATE 25 MG: 25 TABLET ORAL at 09:14

## 2019-07-30 RX ADMIN — ACETAMINOPHEN 650 MG: 325 TABLET, FILM COATED ORAL at 06:10

## 2019-07-30 RX ADMIN — ONDANSETRON 4 MG: 2 INJECTION INTRAMUSCULAR; INTRAVENOUS at 20:26

## 2019-07-30 RX ADMIN — ONDANSETRON 4 MG: 2 INJECTION INTRAMUSCULAR; INTRAVENOUS at 12:34

## 2019-07-30 RX ADMIN — ENOXAPARIN SODIUM 40 MG: 40 INJECTION SUBCUTANEOUS at 09:14

## 2019-07-30 RX ADMIN — POTASSIUM CHLORIDE 20 MEQ: 20 TABLET, EXTENDED RELEASE ORAL at 14:02

## 2019-07-30 RX ADMIN — SODIUM BICARBONATE 650 MG TABLET 650 MG: at 09:14

## 2019-07-30 RX ADMIN — ACETAMINOPHEN 650 MG: 325 TABLET, FILM COATED ORAL at 20:13

## 2019-07-30 RX ADMIN — MORPHINE SULFATE 2 MG: 2 INJECTION, SOLUTION INTRAMUSCULAR; INTRAVENOUS at 07:20

## 2019-07-30 RX ADMIN — POTASSIUM CHLORIDE 20 MEQ: 200 INJECTION, SOLUTION INTRAVENOUS at 07:07

## 2019-07-30 RX ADMIN — INSULIN LISPRO 4 UNITS: 100 INJECTION, SOLUTION INTRAVENOUS; SUBCUTANEOUS at 21:12

## 2019-07-30 RX ADMIN — POTASSIUM CHLORIDE 40 MEQ: 20 TABLET, EXTENDED RELEASE ORAL at 20:04

## 2019-07-30 RX ADMIN — PRAVASTATIN SODIUM 10 MG: 20 TABLET ORAL at 21:11

## 2019-07-30 RX ADMIN — ONDANSETRON 4 MG: 2 INJECTION INTRAMUSCULAR; INTRAVENOUS at 01:06

## 2019-07-30 RX ADMIN — POTASSIUM CHLORIDE 20 MEQ: 20 TABLET, EXTENDED RELEASE ORAL at 17:12

## 2019-07-30 RX ADMIN — MORPHINE SULFATE 2 MG: 2 INJECTION, SOLUTION INTRAMUSCULAR; INTRAVENOUS at 23:28

## 2019-07-30 RX ADMIN — Medication 2 G: at 09:43

## 2019-07-30 RX ADMIN — Medication 10 ML: at 14:02

## 2019-07-30 RX ADMIN — DEXTROSE MONOHYDRATE AND SODIUM CHLORIDE 125 ML/HR: 5; .45 INJECTION, SOLUTION INTRAVENOUS at 03:36

## 2019-07-30 RX ADMIN — SODIUM BICARBONATE 650 MG TABLET 650 MG: at 21:11

## 2019-07-30 RX ADMIN — PERFLUTREN 1 ML: 6.52 INJECTION, SUSPENSION INTRAVENOUS at 10:00

## 2019-07-30 RX ADMIN — SODIUM BICARBONATE 650 MG TABLET 650 MG: at 17:13

## 2019-07-30 RX ADMIN — ACETAMINOPHEN 650 MG: 325 TABLET, FILM COATED ORAL at 01:06

## 2019-07-30 RX ADMIN — Medication 2 G: at 17:00

## 2019-07-30 RX ADMIN — METOPROLOL TARTRATE 50 MG: 50 TABLET ORAL at 20:20

## 2019-07-30 RX ADMIN — Medication 10 ML: at 06:00

## 2019-07-30 RX ADMIN — INSULIN LISPRO 6 UNITS: 100 INJECTION, SOLUTION INTRAVENOUS; SUBCUTANEOUS at 17:13

## 2019-07-30 RX ADMIN — MORPHINE SULFATE 2 MG: 2 INJECTION, SOLUTION INTRAMUSCULAR; INTRAVENOUS at 03:08

## 2019-07-30 RX ADMIN — INSULIN GLARGINE 50 UNITS: 100 INJECTION, SOLUTION SUBCUTANEOUS at 08:34

## 2019-07-30 RX ADMIN — MORPHINE SULFATE 2 MG: 2 INJECTION, SOLUTION INTRAMUSCULAR; INTRAVENOUS at 12:25

## 2019-07-30 NOTE — PROGRESS NOTES
Bedside report received from [place], [facility]. Dual sign off on insulin gtt. Patient is currently sinus tach in 110s-120s, on room air with 98% O2 Sat, Complains of leg pain that started with UTI a few weeks ago. Patient alert and oriented, follows commands. Current temperature is 100.4, PRN Tylenol given.

## 2019-07-30 NOTE — PROGRESS NOTES
Critical Care Outreach Nurse Progress Report:    Subjective: In to assess pt secondary to tx from CCU. MEWS Score: 2 (07/30/19 1100)    Vitals:    07/30/19 1300 07/30/19 1400 07/30/19 1500 07/30/19 1616   BP: 116/70 133/80 (!) 144/92 (!) 137/92   Pulse: (!) 112 (!) 117 (!) 114 (!) 118   Resp: 26 16 27 26   Temp:    98.2 °F (36.8 °C)   SpO2: 98% 100% 100% 98%   Weight:       Height:            Objective: Pt resting quietly in bed, in NAD, watching tv. Pain Intensity 1: 0 (07/30/19 1315)  Pain Location 1: Leg  Pain Intervention(s) 1: Medication (see MAR)  Patient Stated Pain Goal: 0    Assessment: A&Ox4. Lung sounds clear. On room air. ST, HR 110s on remote telemetry. Denies any complaints/needs. Plan: Will follow per outreach protocol.

## 2019-07-30 NOTE — PROGRESS NOTES
MD aware of tachycardia after starting PO lopressor. MD to increase dose starting tonight. OK to transfer to floor with remote tele orders.

## 2019-07-30 NOTE — PROGRESS NOTES
TRANSFER - OUT REPORT:    Verbal report given to Ruth Shukla on Leny Cardozo  being transferred to  for routine progression of care       Report consisted of patients Situation, Background, Assessment and   Recommendations(SBAR). Information from the following report(s) SBAR, Kardex, MAR, Recent Results and Cardiac Rhythm ST was reviewed with the receiving nurse. Lines:   Peripheral IV 07/28/19 Right Hand (Active)   Site Assessment Clean, dry, & intact 7/30/2019 11:00 AM   Phlebitis Assessment 0 7/30/2019 11:00 AM   Infiltration Assessment 0 7/30/2019 11:00 AM   Dressing Status Clean, dry, & intact 7/30/2019 11:00 AM   Dressing Type Tape;Transparent 7/30/2019 11:00 AM   Hub Color/Line Status Blue;Patent; Flushed 7/30/2019 11:00 AM   Alcohol Cap Used No 7/30/2019 11:00 AM       Peripheral IV 07/28/19 Right External jugular (Active)   Site Assessment Clean, dry, & intact 7/30/2019 11:00 AM   Phlebitis Assessment 0 7/30/2019 11:00 AM   Infiltration Assessment 0 7/30/2019 11:00 AM   Dressing Status Clean, dry, & intact 7/30/2019 11:00 AM   Dressing Type Tape;Transparent 7/30/2019 11:00 AM   Hub Color/Line Status Green;Patent;Capped 7/30/2019 11:00 AM   Alcohol Cap Used No 7/30/2019 11:00 AM       Peripheral IV 07/29/19 Right Antecubital (Active)   Site Assessment Clean, dry, & intact 7/30/2019 11:00 AM   Phlebitis Assessment 0 7/30/2019 11:00 AM   Infiltration Assessment 0 7/30/2019 11:00 AM   Dressing Status Clean, dry, & intact 7/30/2019 11:00 AM   Dressing Type Tape;Transparent 7/30/2019 11:00 AM   Hub Color/Line Status Pink;Patent; Flushed 7/30/2019 11:00 AM   Alcohol Cap Used No 7/30/2019 11:00 AM        Opportunity for questions and clarification was provided. Patient transported with:   Monitor  Tech     Pt to notify family of transfer.

## 2019-07-30 NOTE — PROGRESS NOTES
Hourly rounds performed this shift all needs were met, pt transferred from CCU. Pt ambulated to thr bathroom w/o assistance. Girlfriend at bedside.

## 2019-07-30 NOTE — PROGRESS NOTES
A follow up visit was made to the patient. Emotional support, spiritual presence and   prayer were provided. The patient was sleeping. His wife, Sudeep Jackson, was present and she was tearful. She shared that she was overwhelmed by this hospitalization for her . She acknowledged that she was experiencing a sense of helplessness.  provided encouragement, active listening  and prayed specifically for her concerns.       [employer]

## 2019-07-30 NOTE — PROGRESS NOTES
Bedside shift report received from [place], [facility]. Insulin gtt dually verified. Pt resting quietly, oriented x4. Pt c/o back, L leg pain - to medicate with PRN morphine. Complaining that he is hungry and has not eaten in days. Afebrile, ST 110s on monitor, SBP 130s, O2 sat 99% on RA.

## 2019-07-30 NOTE — INTERDISCIPLINARY ROUNDS
Interdisciplinary team rounds were held 7/30/2019 with the following team members:Care Management, Nursing, Nurse Practitioner, Nutrition, Palliative Care, Pastoral Care, Pharmacy, Physician, Respiratory Therapy and Clinical Coordinator. Plan of care discussed. See clinical pathway and/or care plan for interventions and desired outcomes.

## 2019-07-30 NOTE — PROGRESS NOTES
Hospitalist Note     Admit Date:  2019  7:18 PM   Name:  Elbert Donis   Age:  44 y.o.  :  1970   MRN:  [id number]   PCP:  Ladonna Ramos MD  Treatment Team: Attending Provider: Sarah Mckeon MD; Primary Nurse: Consuelo Cherry, SAM; Care Manager: Umm Piper, RN; Utilization Review: Vandana Dukes RN    HPI/Subjective:   Pt is a 43 y/o M with DM who presented to ER with weakness, chills, abd pain. Met sepsis criteria, suspected UTI. Found to be in DKA. Admitted to ICU. Started on rocephin. WBC improved. Blood and urine cultures came back with staph aureus. Switched to ancef. 2d echo ordered. CT abd ordered for pain and found cavitary lesions in lung, also seen on CT chest.  pulm and ID consulted.  - pt feeling better. Has had dry cough for weeks. Still with some L flank pain but improved. Hungry and wants to eat.   Low grade fever 100.4 last night    Objective:     Patient Vitals for the past 24 hrs:   Temp Pulse Resp BP SpO2   19 0701 98.8 °F (37.1 °C) (!) 115 23 132/80 100 %   19 0600  (!) 109 21 118/68 99 %   19 0500  (!) 109 24 112/69 98 %   19 0400  (!) 117 24 111/69 97 %   19 0300 99 °F (37.2 °C) (!) 110 18 142/85 100 %   19 0200  (!) 114 21 142/78 99 %   19 0101  (!) 110 13 148/86 100 %   19 2300  (!) 108 18 112/72 99 %   19 2000 99.2 °F (37.3 °C)  26 116/71 98 %   19 1900 100.4 °F (38 °C) (!) 125 25 106/61 99 %   19 1800  (!) 121 17 140/77 99 %   19 1700  (!) 123 28 115/71 99 %   19 1600  (!) 111 23 130/76 98 %   19 1524 99 °F (37.2 °C)       19 1500  (!) 113 16 124/81 100 %   19 1400  (!) 117 16 114/69 98 %   19 1259  (!) 107 17 134/79    19 1159 99.4 °F (37.4 °C) (!) 110 22 117/70 99 %   19 1059  (!) 117 25 119/72 99 %   19 0959  (!) 117 27 123/78 100 %   19 0947  (!) 119 24 110/72 100 %   19 0859  (!) 122 25 123/74 99 %     Oxygen Therapy  O2 Sat (%): 100 % (07/30/19 0701)  Pulse via Oximetry: 115 beats per minute (07/30/19 0701)  O2 Device: Room air (07/30/19 0701)      Intake/Output Summary (Last 24 hours) at 7/30/2019 0852  Last data filed at 7/30/2019 0723  Gross per 24 hour   Intake 1102.52 ml   Output 2680 ml   Net -1577.48 ml       *Note that automatically entered I/Os may not be accurate; dependent on patient compliance with collection and accurate  by techs. General:    Well nourished. Alert. CV:   Tachy, reg. No murmur, rub, or gallop. Lungs:   CTAB. No wheezing, rhonchi, or rales. Abdomen:   Soft, nondistended. Mild TTP L flank  Extremities: Warm and dry. No cyanosis or edema. Skin:     No rashes or jaundice.    Neuro:  No gross focal deficits    Data Review:  I have reviewed all labs, meds, and studies from the last 24 hours:    Recent Results (from the past 24 hour(s))   GLUCOSE, POC    Collection Time: 07/29/19  9:08 AM   Result Value Ref Range    Glucose (POC) 178 (H) 65 - 013 mg/dL   METABOLIC PANEL, BASIC    Collection Time: 07/29/19  9:49 AM   Result Value Ref Range    Sodium 141 136 - 145 mmol/L    Potassium 3.6 3.5 - 5.1 mmol/L    Chloride 113 (H) 98 - 107 mmol/L    CO2 16 (L) 21 - 32 mmol/L    Anion gap 12 7 - 16 mmol/L    Glucose 174 (H) 65 - 100 mg/dL    BUN 16 6 - 23 MG/DL    Creatinine 0.91 0.8 - 1.5 MG/DL    GFR est AA >60 >60 ml/min/1.73m2    GFR est non-AA >60 >60 ml/min/1.73m2    Calcium 8.7 8.3 - 10.4 MG/DL   MAGNESIUM    Collection Time: 07/29/19  9:49 AM   Result Value Ref Range    Magnesium 2.0 1.8 - 2.4 mg/dL   GLUCOSE, POC    Collection Time: 07/29/19 10:06 AM   Result Value Ref Range    Glucose (POC) 164 (H) 65 - 100 mg/dL   GLUCOSE, POC    Collection Time: 07/29/19 11:11 AM   Result Value Ref Range    Glucose (POC) 163 (H) 65 - 100 mg/dL   GLUCOSE, POC    Collection Time: 07/29/19 12:16 PM   Result Value Ref Range    Glucose (POC) 168 (H) 65 - 100 mg/dL METABOLIC PANEL, BASIC    Collection Time: 07/29/19 12:40 PM   Result Value Ref Range    Sodium 139 136 - 145 mmol/L    Potassium 3.4 (L) 3.5 - 5.1 mmol/L    Chloride 110 (H) 98 - 107 mmol/L    CO2 17 (L) 21 - 32 mmol/L    Anion gap 12 7 - 16 mmol/L    Glucose 180 (H) 65 - 100 mg/dL    BUN 16 6 - 23 MG/DL    Creatinine 0.81 0.8 - 1.5 MG/DL    GFR est AA >60 >60 ml/min/1.73m2    GFR est non-AA >60 >60 ml/min/1.73m2    Calcium 8.5 8.3 - 10.4 MG/DL   MAGNESIUM    Collection Time: 07/29/19 12:40 PM   Result Value Ref Range    Magnesium 2.0 1.8 - 2.4 mg/dL   GLUCOSE, POC    Collection Time: 07/29/19  1:15 PM   Result Value Ref Range    Glucose (POC) 155 (H) 65 - 100 mg/dL   GLUCOSE, POC    Collection Time: 07/29/19  2:12 PM   Result Value Ref Range    Glucose (POC) 165 (H) 65 - 100 mg/dL   GLUCOSE, POC    Collection Time: 07/29/19  3:21 PM   Result Value Ref Range    Glucose (POC) 163 (H) 65 - 651 mg/dL   METABOLIC PANEL, BASIC    Collection Time: 07/29/19  4:11 PM   Result Value Ref Range    Sodium 137 136 - 145 mmol/L    Potassium 3.4 (L) 3.5 - 5.1 mmol/L    Chloride 108 (H) 98 - 107 mmol/L    CO2 17 (L) 21 - 32 mmol/L    Anion gap 12 7 - 16 mmol/L    Glucose 176 (H) 65 - 100 mg/dL    BUN 14 6 - 23 MG/DL    Creatinine 0.73 (L) 0.8 - 1.5 MG/DL    GFR est AA >60 >60 ml/min/1.73m2    GFR est non-AA >60 >60 ml/min/1.73m2    Calcium 8.7 8.3 - 10.4 MG/DL   MAGNESIUM    Collection Time: 07/29/19  4:11 PM   Result Value Ref Range    Magnesium 2.0 1.8 - 2.4 mg/dL   GLUCOSE, POC    Collection Time: 07/29/19  4:11 PM   Result Value Ref Range    Glucose (POC) 174 (H) 65 - 100 mg/dL   GLUCOSE, POC    Collection Time: 07/29/19  5:18 PM   Result Value Ref Range    Glucose (POC) 204 (H) 65 - 100 mg/dL   GLUCOSE, POC    Collection Time: 07/29/19  6:23 PM   Result Value Ref Range    Glucose (POC) 203 (H) 65 - 100 mg/dL   GLUCOSE, POC    Collection Time: 07/29/19  7:30 PM   Result Value Ref Range    Glucose (POC) 178 (H) 65 - 100 mg/dL GLUCOSE, POC    Collection Time: 07/29/19  8:34 PM   Result Value Ref Range    Glucose (POC) 198 (H) 65 - 778 mg/dL   METABOLIC PANEL, BASIC    Collection Time: 07/29/19  9:23 PM   Result Value Ref Range    Sodium 135 (L) 136 - 145 mmol/L    Potassium 3.8 3.5 - 5.1 mmol/L    Chloride 107 98 - 107 mmol/L    CO2 18 (L) 21 - 32 mmol/L    Anion gap 10 7 - 16 mmol/L    Glucose 191 (H) 65 - 100 mg/dL    BUN 10 6 - 23 MG/DL    Creatinine 0.70 (L) 0.8 - 1.5 MG/DL    GFR est AA >60 >60 ml/min/1.73m2    GFR est non-AA >60 >60 ml/min/1.73m2    Calcium 8.3 8.3 - 10.4 MG/DL   MAGNESIUM    Collection Time: 07/29/19  9:23 PM   Result Value Ref Range    Magnesium 1.7 (L) 1.8 - 2.4 mg/dL   GLUCOSE, POC    Collection Time: 07/29/19  9:43 PM   Result Value Ref Range    Glucose (POC) 167 (H) 65 - 100 mg/dL   GLUCOSE, POC    Collection Time: 07/29/19 10:47 PM   Result Value Ref Range    Glucose (POC) 159 (H) 65 - 100 mg/dL   GLUCOSE, POC    Collection Time: 07/29/19 11:52 PM   Result Value Ref Range    Glucose (POC) 151 (H) 65 - 796 mg/dL   METABOLIC PANEL, BASIC    Collection Time: 07/30/19 12:47 AM   Result Value Ref Range    Sodium 135 (L) 136 - 145 mmol/L    Potassium 3.9 3.5 - 5.1 mmol/L    Chloride 107 98 - 107 mmol/L    CO2 18 (L) 21 - 32 mmol/L    Anion gap 10 7 - 16 mmol/L    Glucose 170 (H) 65 - 100 mg/dL    BUN 9 6 - 23 MG/DL    Creatinine 0.73 (L) 0.8 - 1.5 MG/DL    GFR est AA >60 >60 ml/min/1.73m2    GFR est non-AA >60 >60 ml/min/1.73m2    Calcium 8.7 8.3 - 10.4 MG/DL   MAGNESIUM    Collection Time: 07/30/19 12:47 AM   Result Value Ref Range    Magnesium 1.9 1.8 - 2.4 mg/dL   GLUCOSE, POC    Collection Time: 07/30/19  1:02 AM   Result Value Ref Range    Glucose (POC) 142 (H) 65 - 100 mg/dL   GLUCOSE, POC    Collection Time: 07/30/19  2:07 AM   Result Value Ref Range    Glucose (POC) 184 (H) 65 - 100 mg/dL   GLUCOSE, POC    Collection Time: 07/30/19  3:13 AM   Result Value Ref Range    Glucose (POC) 159 (H) 65 - 100 mg/dL METABOLIC PANEL, BASIC    Collection Time: 07/30/19  3:21 AM   Result Value Ref Range    Sodium 136 136 - 145 mmol/L    Potassium 3.3 (L) 3.5 - 5.1 mmol/L    Chloride 107 98 - 107 mmol/L    CO2 18 (L) 21 - 32 mmol/L    Anion gap 11 7 - 16 mmol/L    Glucose 174 (H) 65 - 100 mg/dL    BUN 7 6 - 23 MG/DL    Creatinine 0.65 (L) 0.8 - 1.5 MG/DL    GFR est AA >60 >60 ml/min/1.73m2    GFR est non-AA >60 >60 ml/min/1.73m2    Calcium 8.7 8.3 - 10.4 MG/DL   MAGNESIUM    Collection Time: 07/30/19  3:21 AM   Result Value Ref Range    Magnesium 1.8 1.8 - 2.4 mg/dL   CBC WITH AUTOMATED DIFF    Collection Time: 07/30/19  3:21 AM   Result Value Ref Range    WBC 9.1 4.3 - 11.1 K/uL    RBC 3.86 (L) 4.23 - 5.6 M/uL    HGB 10.2 (L) 13.6 - 17.2 g/dL    HCT 31.3 (L) 41.1 - 50.3 %    MCV 81.1 79.6 - 97.8 FL    MCH 26.4 26.1 - 32.9 PG    MCHC 32.6 31.4 - 35.0 g/dL    RDW 14.6 11.9 - 14.6 %    PLATELET 385 062 - 205 K/uL    MPV 9.5 9.4 - 12.3 FL    ABSOLUTE NRBC 0.00 0.0 - 0.2 K/uL    DF AUTOMATED      NEUTROPHILS 80 (H) 43 - 78 %    LYMPHOCYTES 9 (L) 13 - 44 %    MONOCYTES 9 4.0 - 12.0 %    EOSINOPHILS 1 0.5 - 7.8 %    BASOPHILS 1 0.0 - 2.0 %    IMMATURE GRANULOCYTES 1 0.0 - 5.0 %    ABS. NEUTROPHILS 7.3 1.7 - 8.2 K/UL    ABS. LYMPHOCYTES 0.8 0.5 - 4.6 K/UL    ABS. MONOCYTES 0.8 0.1 - 1.3 K/UL    ABS. EOSINOPHILS 0.1 0.0 - 0.8 K/UL    ABS. BASOPHILS 0.1 0.0 - 0.2 K/UL    ABS. IMM.  GRANS. 0.1 0.0 - 0.5 K/UL   HEMOGLOBIN A1C WITH EAG    Collection Time: 07/30/19  3:21 AM   Result Value Ref Range    Hemoglobin A1c 11.9 (H) 4.8 - 6.0 %    Est. average glucose 295 mg/dL   GLUCOSE, POC    Collection Time: 07/30/19  4:16 AM   Result Value Ref Range    Glucose (POC) 168 (H) 65 - 100 mg/dL   GLUCOSE, POC    Collection Time: 07/30/19  5:26 AM   Result Value Ref Range    Glucose (POC) 160 (H) 65 - 100 mg/dL   GLUCOSE, POC    Collection Time: 07/30/19  6:31 AM   Result Value Ref Range    Glucose (POC) 153 (H) 65 - 100 mg/dL   GLUCOSE, POC Collection Time: 07/30/19  7:34 AM   Result Value Ref Range    Glucose (POC) 168 (H) 65 - 100 mg/dL   GLUCOSE, POC    Collection Time: 07/30/19  8:37 AM   Result Value Ref Range    Glucose (POC) 151 (H) 65 - 100 mg/dL        All Micro Results     Procedure Component Value Units Date/Time    CULTURE, BLOOD [[id number]]  (Abnormal) Collected:  07/28/19 2339    Order Status:  Completed Specimen:  Blood Updated:  07/30/19 0717     Special Requests: --        LEFT  HAND       GRAM STAIN       GRAM POS COCCI IN CLUSTERS            AEROBIC BOTTLE POSITIVE               CRITICAL RESULT NOT CALLED DUE TO PREVIOUS NOTIFICATION OF CRITICAL RESULT WITHIN THE LAST 24 HOURS. Culture result:       STAPHYLOCOCCUS AUREUS SENSITIVITY TO FOLLOW          CULTURE, BLOOD [[id number]]  (Abnormal) Collected:  07/28/19 1945    Order Status:  Completed Specimen:  Blood Updated:  07/30/19 0716     Special Requests: --        RIGHT  JUGULAR VEIN       GRAM STAIN       GRAM POS COCCI IN CLUSTERS                  AEROBIC AND ANAEROBIC BOTTLES                  RESULTS VERIFIED, PHONED TO AND READ BACK BY ALFONSO [facility] [address] RN AT [address] [place] [address] [place] [address]807 [place]           Culture result:       STAPHYLOCOCCUS AUREUS SENSITIVITY TO FOLLOW                  STAPHYLOCOCCUS AUREUS DETECTED Test Performed by Multiplex PCR                  RESULTS VERIFIED, PHONED TO AND READ BACK BY  ALFONSO [facility] [address] RN AT [address] [place] [address] [place] 134418 [place]      CULTURE, URINE [[id number]]  (Abnormal) Collected:  07/28/19 1901    Order Status:  Completed Specimen:  Urine from Clean catch Updated:  07/30/19 0643     Special Requests: NO SPECIAL REQUESTS        Culture result:       >100,000 COLONIES/mL STAPHYLOCOCCUS AUREUS            SENSITIVITY TO FOLLOW             No results found for this visit on 07/28/19.     Current Meds:  Current Facility-Administered Medications   Medication Dose Route Frequency    NUTRITIONAL SUPPORT ELECTROLYTE PRN ORDERS   Does Not Apply PRN    potassium chloride 20 mEq in 100 ml IVPB  20 mEq IntraVENous Q2H    sodium bicarbonate tablet 650 mg  650 mg Oral TID    ceFAZolin (ANCEF) 2 g/20 mL in sterile water IV syringe  2 g IntraVENous Q8H    insulin glargine (LANTUS) injection 50 Units  50 Units SubCUTAneous DAILY    potassium chloride (K-DUR, KLOR-CON) SR tablet 40 mEq  40 mEq Oral QHS    metoprolol tartrate (LOPRESSOR) tablet 25 mg  25 mg Oral Q12H    albuterol (PROVENTIL VENTOLIN) nebulizer solution 2.5 mg  2.5 mg Nebulization Q4H PRN    brompheniramine-pseudoeph-DM (DIMETAPP) 2-30-10 mg/5 mL syrup 5 mL (Patient Supplied)  5 mL Oral QID PRN    fluticasone propionate (FLONASE) 50 mcg/actuation nasal spray 2 Spray  2 Spray Both Nostrils DAILY    pravastatin (PRAVACHOL) tablet 10 mg  10 mg Oral QHS    sertraline (ZOLOFT) tablet 50 mg  50 mg Oral DAILY    enoxaparin (LOVENOX) injection 40 mg  40 mg SubCUTAneous Q24H    dextrose 5 % - 0.45% NaCl infusion  125 mL/hr IntraVENous CONTINUOUS    sodium chloride (NS) flush 5-10 mL  5-10 mL IntraVENous PRN    insulin regular (NOVOLIN R, HUMULIN R) 100 Units in 0.9% sodium chloride 100 mL infusion  0-50 Units/hr IntraVENous TITRATE    dextrose 40% (GLUTOSE) oral gel 1 Tube  15 g Oral PRN    glucagon (GLUCAGEN) injection 1 mg  1 mg IntraMUSCular PRN    dextrose (D50W) injection syrg 12.5-25 g  25-50 mL IntraVENous PRN    sodium chloride (NS) flush 5-40 mL  5-40 mL IntraVENous Q8H    sodium chloride (NS) flush 5-40 mL  5-40 mL IntraVENous PRN    acetaminophen (TYLENOL) tablet 650 mg  650 mg Oral Q4H PRN    naloxone (NARCAN) injection 0.4 mg  0.4 mg IntraVENous PRN    diphenhydrAMINE (BENADRYL) injection 12.5 mg  12.5 mg IntraVENous Q4H PRN    ondansetron (ZOFRAN) injection 4 mg  4 mg IntraVENous Q4H PRN    morphine injection 2 mg  2 mg IntraVENous Q4H PRN       Other Studies (last 24 hours):  Ct Chest W Cont    Result Date: 7/29/2019  EXAM: CT Angiogram of the chest. INDICATION: Evaluate mass seen on abdomen CT. Comparison: Same day abdomen CT. Multiple axial images were obtained through the chest after intravenous infusion of 100mL of Isovue 370. Radiation dose reduction techniques were used for this study: All CT scans performed at this facility use one or all of the following: Automated exposure control, adjustment of the mA and/or kVp according to patient's size, iterative reconstruction. FINDINGS: CHEST: LUNGS/PLEURA: Central airways are clear. No pneumothorax or pleural effusion. There is a subpleural right middle lobe 9 mm solid pulmonary nodules central cavitation. There is atelectatic change of the right lung base. There is atelectatic change of the left lung base with a subpleural 13 x 21 mm pulmonary nodule with central cavitation as seen on abdomen CT. There is a left upper lobe subpleural 6 mm solid pulmonary nodule adjacent to the pericardium. MEDIASTINUM: No suspicious thyroid nodule. Thoracic aorta is normal in caliber. Moderate coronary artery calcific atherosclerosis. Heart is normal in size. No mediastinal or hilar adenopathy. Esophagus is unremarkable. BONES/SUBCUTANEOUS TISSUE: No aggressive osseous lesion. No subcutaneous soft tissue abnormality. UPPER ABDOMEN: Limited visualization of the upper abdomen is unremarkable. IMPRESSION: 1. Right middle and left lower lobe solid pulmonary nodules with central cavitation, the left lower lobe pulmonary nodule measuring approximately 13 x 21 mm. Additional subpleural 4 mm left upper lobe solid pulmonary nodule which is indeterminate. Given the presence of more than one cavitary pulmonary nodule septic thromboemboli should be considered additionally cavitary pneumonia, pulmonary infarction, Wegener's granulomatosis, or lung carcinoma can present with cavitary pulmonary nodules. Clinical correlation is advised. Ct Abd Pelv Wo Cont    Result Date: 7/29/2019  EXAM: CT of the abdomen and pelvis without contrast. Indication: Uncomplicated pyelonephritis. Comparison: None. Multiple axial images were obtained through the abdomen and pelvis without IV contrast.  Radiation dose reduction techniques were used for this study: All CT scans performed at this facility use one or all of the following: Automated exposure control, adjustment of the mA and/or kVp according to patient's size, iterative reconstruction. FINDINGS: LOWER THORAX: Left lung base 23 x 11 mm solid pulmonary nodule with central cavitation just above the left hemidiaphragm. Subpleural anterior left lower lobe 8 mm solid pulmonary nodule with a central cavitation. LIVER: Normal size and contour. No focal liver lesions. BILIARY SYSTEM: The gallbladder is normal. No biliary duct dilation. SPLEEN: No splenomegaly or suspicious splenic lesion. PANCREAS: No pancreatic mass. No pancreatic duct dilation. ADRENALS: No adrenal nodule or adrenal hypertrophy. URINARY SYSTEM: Limited evaluation of the kidneys without IV contrast. No perinephric fluid collection to suggest perinephric abscess. No hydronephrosis. Exophytic intermediate density left lower pole renal mass and completely characterized. Bladder is unremarkable. There is left perinephric fat stranding. FLUID:  No free fluid. REPRODUCTIVE ORGANS: Unremarkable. BOWEL: Stomach, small bowel, and large bowel are normal in course and caliber. No evidence of obstruction. VASCULAR/NODES: No aortic or iliac artery aneurysm. No lymphadenopathy. BONES/SUBCUTANEOUS TISSUE: Subcutaneous emphysematous changes of the right anterior lower quadrant abdominal wall likely injection sites. IMPRESSION: 1. Left-sided perinephric fat stranding without a perinephric fluid collection to suggest abscess. These findings are suspicious however nondiagnostic pyelonephritis. There is a left renal exophytic 2 cm intermediate density mass which isn't completely characterized. No hydronephrosis. Recommend follow-up with CT renal mass protocol when acute symptoms resolve.  2. Left lower lobe subpleural 23 x 11 mm solid pulmonary nodules central cavitation as well as a 8 mm solid pulmonary nodules central cavitation of the right basal anterior segment of the lower lobe. These findings are suspicious for septic emboli given multiplicity and subpleural location however malignancy cannot be excluded. Recommend chest CT for further evaluation. Assessment and Plan:     Hospital Problems as of 7/30/2019 Date Reviewed: 1/4/2017          Codes Class Noted - Resolved POA    Pulmonary cavitary lesion ICD-10-CM: J98.4  ICD-9-CM: 518.89  7/30/2019 - Present Yes        Acute pyelonephritis ICD-10-CM: N10  ICD-9-CM: 590.10  7/30/2019 - Present Yes        * (Principal) Sepsis due to methicillin susceptible Staphylococcus aureus [facility]) ICD-10-CM: A41.01  ICD-9-CM: 038.11, 995.91  7/29/2019 - Present Yes        DKA (diabetic ketoacidoses) ([facility] [address] ICD-10-CM: E13.10  ICD-9-CM: 250.10  7/28/2019 - Present Yes        HTN (hypertension) (Chronic) ICD-10-CM: I10  ICD-9-CM: 401.9  11/18/2013 - Present Yes              Plan:  DKA  -give bicarb tablets  -give lantus 50u now, turn off insulin gtt and IVF in 2 hours  -holding home PO meds  -a1c high indicating poor outpt control  -DM management following  -NPO until lunch    Sepsis  -MSSA in blood and urine cultures per pharmacist; I do not see this in the micro yet. Do not see MRSA DNA results either. Will switch to ancef. Follow up. WBC improved on rocephin  -echo pending. Metop started to slow HR down.   May need IV  -appreciate ID recs    Cavitary pulmonary lesions, dry cough  -appreciate ID and pulmonary input    HypoK  -replace    DC planning/Dispo:    -likely home when improved    Diet:  DIET NPO  DIET DIABETIC CONSISTENT CARB  DVT ppx:  [email or web address]    Signed:  Sondra Reddy MD

## 2019-07-30 NOTE — CONSULTS
Infectious Disease Consult    Today's Date: 7/30/2019   Admit Date: 7/28/2019    Impression:   · MSSA bacteremia (7/28) with pulmonary emboli and L pyelonephritis. No source identified. · Uncontrolled DMII    Plan:   ·  Continue Ancef. · Check repeat BCs today   · Follow TTE as CT chest showing likely R sided vegetation   · Screen HIV/HCV    Anti-infectives:   · Ancef (7/28-  ·     Subjective:   Date of Consultation:  July 30, 2019  Referring Physician: Chani Ortega     Patient is a 44 y.o. male with significant medical hx for DMII and chronic back pain, who presented to ED for  Abdominal pain, fever with chills on 7/28. He was admitted to ICU for DKA protocol. Tm 100.4Fwith leukocytosis. BC 3/4 bottles + MSSA per PCR. UC also with staph aureus. CT chest showing multiple cavitary lesions. CT A/P showing L sided perinephric stranding consistent with pyelonephritis. TTE pending. Started on Ancef. He states that he is feeling better today. + L CVA tenderness   To note, patient originally presented to urgent care on 7/12 for dysuria, BS noted to be 279. UC + MSSA but treated with IM CTX and oral Cipro. No recent A1C but serum BS levels >250 most times. His wife states that he was feeling ill one week prior to 7/12 with N/V, chills. HE lives in [place] and employed as  and sets up fair equipment.      Patient Active Problem List   Diagnosis Code    DM (diabetes mellitus) ([facility] [address] E11.9    HTN (hypertension) I10    Mixed hyperlipidemia E78.5    Unspecified asthma(493.90) J45.909    Allergic rhinitis J30.9    Low back pain M54.5    Esophageal reflux K21.9    DKA (diabetic ketoacidoses) (HCC) E13.10    Sepsis ([address] A41.9     Past Medical History:   Diagnosis Date    Chronic back pain     DM (diabetes mellitus) ([address]     DM (diabetes mellitus) ([address] 11/18/2013    GERD (gastroesophageal reflux disease)     HTN (hypertension)     HTN (hypertension) 11/18/2013    Sepsis secondary to UTI ([address] 7/29/2019    Snoring       Family History   Problem Relation Age of Onset    Stroke Mother     Heart Disease Father       Social History     Tobacco Use    Smoking status: Never Smoker    Smokeless tobacco: Never Used   Substance Use Topics    Alcohol use: No     Past Surgical History:   Procedure Laterality Date    HX ORTHOPAEDIC  2010    right fore foot    HX SEPTOPLASTY        Prior to Admission medications    Medication Sig Start Date End Date Taking? Authorizing Provider   naproxen (NAPROSYN) 500 mg tablet Take 500 mg by mouth every twelve (12) hours as needed for Pain. Yes Provider, Historical   omeprazole (PRILOSEC) 40 mg capsule Take 40 mg by mouth daily. Yes Provider, Historical   glipiZIDE (GLUCOTROL) 10 mg tablet Take 10 mg by mouth two (2) times a day. Yes Provider, Historical   SITagliptin (JANUVIA) 25 mg tablet Take 25 mg by mouth daily. Yes Provider, Historical   insulin glargine (LANTUS SOLOSTAR U-100 INSULIN) 100 unit/mL (3 mL) inpn 45 Units by SubCUTAneous route nightly. Yes Provider, Historical   metFORMIN (GLUCOPHAGE) 1,000 mg tablet Take 1 Tab by mouth two (2) times daily (with meals) for 60 days. Indications: type 2 diabetes mellitus 5/4/17 7/29/19 Yes Elton Dimas NP   pravastatin (PRAVACHOL) 10 mg tablet Take 1 Tab by mouth nightly. 1/4/17  Yes Cristiana Robertson NP   ACCU-CHEK KAYE PLUS TEST STRP strip  12/1/15   Provider, Historical   ACCU-CHEK KAYE PLUS METER misc  12/1/15   Provider, Historical       Allergies   Allergen Reactions    Influenza Virus Vaccine, Specific Nausea and Vomiting        Review of Systems:  A comprehensive review of systems was negative except for that written in the History of Present Illness.     Objective:     Visit Vitals  /80 (BP 1 Location: Left arm, BP Patient Position: At rest;Head of bed elevated (Comment degrees))   Pulse (!) 115   Temp 98.8 °F (37.1 °C)   Resp 23   Ht 5' 11\" (1.803 m)   Wt 74 kg (163 lb 2.3 oz)   SpO2 100%   BMI 22.75 kg/m²     Temp (24hrs), Av.3 °F (37.4 °C), Min:98.8 °F (37.1 °C), Max:100.4 °F (38 °C)       Lines:  Peripheral IV:       Physical Exam:    General:  Alert, cooperative, appears stated age   Eyes:  Sclera anicteric. Pupils equally round and reactive to light. Mouth/Throat: Mucous membranes normal, oral pharynx clear   Neck: Supple   Lungs:   Clear to auscultation bilaterally, good effort   CV:  Regular rate and rhythm,no murmur, click, rub or gallop   Abdomen:   Soft, non-tender. bowel sounds normal. non-distended. + L CVA tenderness    Extremities: No cyanosis or edema   Skin: Skin color, texture, turgor normal. no acute rash or lesions   Lymph nodes: Cervical and supraclavicular normal   Musculoskeletal: No swelling or deformity   Lines/Devices:  Intact, no erythema, drainage or tenderness   Psych: Alert and oriented, normal mood affect given the setting       Data Review:     CBC:  Recent Labs     19  03219  0333 19   WBC 9.1 11.6* 20.5*   GRANS 80* 86* 80*   MONOS 9 9 6   EOS 1 0*  --    ANEU 7.3 10.0* 17.7*   ABL 0.8 0.5 1.6   HGB 10.2* 11.1* 12.6*   HCT 31.3* 35.8* 41.6    343 493*       BMP:  Recent Labs     19  0321 19  0047 19   CREA 0.65* 0.73* 0.70*   BUN 7 9 10    135* 135*   K 3.3* 3.9 3.8    107 107   CO2 18* 18* 18*   AGAP 11 10 10   * 170* 191*       LFTS:  Recent Labs     19   TBILI 0.6   ALT 16   SGOT 9*      TP 10.7*   ALB 3.3*       Microbiology:     All Micro Results     Procedure Component Value Units Date/Time    CULTURE, BLOOD [[id number]]  (Abnormal) Collected:  19 9619    Order Status:  Completed Specimen:  Blood Updated:  19     Special Requests: --        LEFT  HAND       GRAM STAIN       GRAM POS COCCI IN CLUSTERS            AEROBIC BOTTLE POSITIVE               CRITICAL RESULT NOT CALLED DUE TO PREVIOUS NOTIFICATION OF CRITICAL RESULT WITHIN THE LAST 24 HOURS. Culture result:       STAPHYLOCOCCUS AUREUS SENSITIVITY TO FOLLOW          CULTURE, BLOOD [[id number]]  (Abnormal) Collected:  07/28/19 1945    Order Status:  Completed Specimen:  Blood Updated:  07/30/19 0716     Special Requests: --        RIGHT  JUGULAR VEIN       GRAM STAIN       GRAM POS COCCI IN CLUSTERS                  AEROBIC AND ANAEROBIC BOTTLES                  RESULTS VERIFIED, PHONED TO AND READ BACK BY ALFONSO [address] RN AT [address] [place] [address] Loop [place] 898653 [place]           Culture result:       STAPHYLOCOCCUS AUREUS SENSITIVITY TO FOLLOW                  STAPHYLOCOCCUS AUREUS DETECTED Test Performed by Multiplex PCR                  RESULTS VERIFIED, PHONED TO AND READ BACK BY  ALFONSO [address] RN AT [address] [place] [address] Loop [place] 060370 [place]      CULTURE, URINE [[id number]]  (Abnormal) Collected:  07/28/19 1901    Order Status:  Completed Specimen:  Urine from Clean catch Updated:  07/30/19 0643     Special Requests: NO SPECIAL REQUESTS        Culture result:       >100,000 COLONIES/mL STAPHYLOCOCCUS AUREUS            SENSITIVITY TO FOLLOW             Imaging:   See hPI/EPIC     Signed By: Katt Benz NP     July 30, 2019

## 2019-07-30 NOTE — PROGRESS NOTES
Problem: Falls - Risk of  Goal: *Absence of Falls  Description  Document Ruthann Kiki Fall Risk and appropriate interventions in the flowsheet.   Outcome: Progressing Towards Goal  Note:   Fall Risk Interventions:  Mobility Interventions: Bed/chair exit alarm         Medication Interventions: Assess postural VS orthostatic hypotension                   Problem: Patient Education: Go to Patient Education Activity  Goal: Patient/Family Education  Outcome: Progressing Towards Goal     Problem: Patient Education: Go to Patient Education Activity  Goal: Patient/Family Education  Outcome: Progressing Towards Goal     Problem: DKA: Day 1  Goal: Off Pathway (Use only if patient is Off Pathway)  Outcome: Progressing Towards Goal  Goal: Activity/Safety  Outcome: Progressing Towards Goal  Goal: Consults, if ordered  Outcome: Progressing Towards Goal  Goal: Diagnostic Tests/Procedures, if Ordered  Outcome: Progressing Towards Goal  Goal: Nutrition/Diet  Outcome: Progressing Towards Goal  Goal: Discharge Planning  Outcome: Progressing Towards Goal  Goal: Medications  Outcome: Progressing Towards Goal  Goal: Respiratory  Outcome: Progressing Towards Goal  Goal: Treatments/Interventions/Procedures  Outcome: Progressing Towards Goal  Goal: Psychosocial  Outcome: Progressing Towards Goal  Goal: *Hemodynamically stable  Outcome: Progressing Towards Goal  Goal: *Blood glucose falling 50 to 100 mg/dl/hr  Outcome: Progressing Towards Goal  Goal: *Potassium normalizing  Outcome: Progressing Towards Goal     Problem: DKA: Day 2  Goal: Off Pathway (Use only if patient is Off Pathway)  Outcome: Progressing Towards Goal  Goal: Activity/Safety  Outcome: Progressing Towards Goal  Goal: Consults, if ordered  Outcome: Progressing Towards Goal  Goal: Diagnostic Test/Procedures  Outcome: Progressing Towards Goal  Goal: Nutrition/Diet  Outcome: Progressing Towards Goal  Goal: Discharge Planning  Outcome: Progressing Towards Goal  Goal: Medications  Outcome: Progressing Towards Goal  Goal: Respiratory  Outcome: Progressing Towards Goal  Goal: Treatments/Interventions/Procedures  Outcome: Progressing Towards Goal  Goal: Psychosocial  Outcome: Progressing Towards Goal  Goal: *Acidosis resolved  Outcome: Progressing Towards Goal  Goal: *Tolerating diet  Outcome: Progressing Towards Goal  Goal: *Demonstrates progressive activity  Outcome: Progressing Towards Goal  Goal: *Blood glucose 80 to 180 mg/dl  Outcome: Progressing Towards Goal     Problem: DKA: Day 3  Goal: Off Pathway (Use only if patient is Off Pathway)  Outcome: Progressing Towards Goal  Goal: Activity/Safety  Outcome: Progressing Towards Goal  Goal: Diagnostic Test/Procedures  Outcome: Progressing Towards Goal  Goal: Nutrition/Diet  Outcome: Progressing Towards Goal  Goal: Discharge Planning  Outcome: Progressing Towards Goal  Goal: Medications  Outcome: Progressing Towards Goal  Goal: Treatments/Interventions/Procedures  Outcome: Progressing Towards Goal  Goal: Psychosocial  Outcome: Progressing Towards Goal     Problem: DKA: Discharge Outcomes  Goal: *Ambulates and performs ADL's  Outcome: Progressing Towards Goal  Goal: *Describes follow-up/return visits to physicians, diabetes treatment coordinator and other resources  Outcome: Progressing Towards Goal  Goal: *Blood glucose at patient's target range  Outcome: Progressing Towards Goal  Goal: *Acidosis resolved  Outcome: Progressing Towards Goal  Goal: *Tolerating diet  Outcome: Progressing Towards Goal  Goal: *Verbalizes understanding and describes prescribed diet  Outcome: Progressing Towards Goal  Goal: *Describes blood glucose goals, monitoring, sick day rules, hypo/hyperglycemia  Outcome: Progressing Towards Goal  Goal: *Describes available resources and support systems  Outcome: Progressing Towards Goal  Goal: *Verbalizes name, dosage, time, side effects, and number of days to continue medications  Outcome: Progressing Towards Goal  Goal: *Demonstrates ability to self-administer insulin  Outcome: Progressing Towards Goal     Problem: Diabetes Self-Management  Goal: *Disease process and treatment process  Description  Define diabetes and identify own type of diabetes; list 3 options for treating diabetes. Outcome: Progressing Towards Goal  Goal: *Incorporating nutritional management into lifestyle  Description  Describe effect of type, amount and timing of food on blood glucose; list 3 methods for planning meals. Outcome: Progressing Towards Goal  Goal: *Incorporating physical activity into lifestyle  Description  State effect of exercise on blood glucose levels. Outcome: Progressing Towards Goal  Goal: *Developing strategies to promote health/change behavior  Description  Define the ABC's of diabetes; identify appropriate screenings, schedule and personal plan for screenings. Outcome: Progressing Towards Goal  Goal: *Using medications safely  Description  State effect of diabetes medications on diabetes; name diabetes medication taking, action and side effects. Outcome: Progressing Towards Goal  Goal: *Monitoring blood glucose, interpreting and using results  Description  Identify recommended blood glucose targets  and personal targets. Outcome: Progressing Towards Goal  Goal: *Prevention, detection, treatment of acute complications  Description  List symptoms of hyper- and hypoglycemia; describe how to treat low blood sugar and actions for lowering  high blood glucose level. Outcome: Progressing Towards Goal  Goal: *Prevention, detection and treatment of chronic complications  Description  Define the natural course of diabetes and describe the relationship of blood glucose levels to long term complications of diabetes.   Outcome: Progressing Towards Goal  Goal: *Developing strategies to address psychosocial issues  Description  Describe feelings about living with diabetes; identify support needed and support network  Outcome: Progressing Towards Goal  Goal: *Insulin pump training  Outcome: Progressing Towards Goal  Goal: *Sick day guidelines  Outcome: Progressing Towards Goal  Goal: *Patient Specific Goal (EDIT GOAL, INSERT TEXT)  Outcome: Progressing Towards Goal     Problem: Patient Education: Go to Patient Education Activity  Goal: Patient/Family Education  Outcome: Progressing Towards Goal

## 2019-07-31 LAB
ANION GAP SERPL CALC-SCNC: 15 MMOL/L (ref 7–16)
BACTERIA SPEC CULT: ABNORMAL
BUN SERPL-MCNC: 16 MG/DL (ref 6–23)
CALCIUM SERPL-MCNC: 9 MG/DL (ref 8.3–10.4)
CHLORIDE SERPL-SCNC: 100 MMOL/L (ref 98–107)
CO2 SERPL-SCNC: 18 MMOL/L (ref 21–32)
CREAT SERPL-MCNC: 0.74 MG/DL (ref 0.8–1.5)
ERYTHROCYTE [DISTWIDTH] IN BLOOD BY AUTOMATED COUNT: 14.8 % (ref 11.9–14.6)
GLUCOSE BLD STRIP.AUTO-MCNC: 265 MG/DL (ref 65–100)
GLUCOSE BLD STRIP.AUTO-MCNC: 329 MG/DL (ref 65–100)
GLUCOSE BLD STRIP.AUTO-MCNC: 351 MG/DL (ref 65–100)
GLUCOSE BLD STRIP.AUTO-MCNC: 389 MG/DL (ref 65–100)
GLUCOSE SERPL-MCNC: 265 MG/DL (ref 65–100)
GRAM STN SPEC: ABNORMAL
HCT VFR BLD AUTO: 31.6 % (ref 41.1–50.3)
HCV AB S/CO SERPL IA: <0.1 S/CO RATIO (ref 0–0.9)
HCV AB SERPL QL IA: NORMAL
HGB BLD-MCNC: 10.4 G/DL (ref 13.6–17.2)
HIV 1+2 AB+HIV1 P24 AG SERPL QL IA: NON REACTIVE
MAGNESIUM SERPL-MCNC: 2 MG/DL (ref 1.8–2.4)
MCH RBC QN AUTO: 26.2 PG (ref 26.1–32.9)
MCHC RBC AUTO-ENTMCNC: 32.9 G/DL (ref 31.4–35)
MCV RBC AUTO: 79.6 FL (ref 79.6–97.8)
NRBC # BLD: 0 K/UL (ref 0–0.2)
PHOSPHATE SERPL-MCNC: 2 MG/DL (ref 2.5–4.5)
PLATELET # BLD AUTO: 275 K/UL (ref 150–450)
PMV BLD AUTO: 9.2 FL (ref 9.4–12.3)
POTASSIUM SERPL-SCNC: 4.4 MMOL/L (ref 3.5–5.1)
RBC # BLD AUTO: 3.97 M/UL (ref 4.23–5.6)
SERVICE CMNT-IMP: ABNORMAL
SODIUM SERPL-SCNC: 133 MMOL/L (ref 136–145)
WBC # BLD AUTO: 9.1 K/UL (ref 4.3–11.1)

## 2019-07-31 PROCEDURE — 65660000000 HC RM CCU STEPDOWN

## 2019-07-31 PROCEDURE — 74011250637 HC RX REV CODE- 250/637: Performed by: INTERNAL MEDICINE

## 2019-07-31 PROCEDURE — 83735 ASSAY OF MAGNESIUM: CPT

## 2019-07-31 PROCEDURE — 99232 SBSQ HOSP IP/OBS MODERATE 35: CPT | Performed by: INTERNAL MEDICINE

## 2019-07-31 PROCEDURE — 74011250636 HC RX REV CODE- 250/636: Performed by: INTERNAL MEDICINE

## 2019-07-31 PROCEDURE — 84100 ASSAY OF PHOSPHORUS: CPT

## 2019-07-31 PROCEDURE — 74011250637 HC RX REV CODE- 250/637: Performed by: PHYSICIAN ASSISTANT

## 2019-07-31 PROCEDURE — 74011636637 HC RX REV CODE- 636/637: Performed by: INTERNAL MEDICINE

## 2019-07-31 PROCEDURE — 74011250637 HC RX REV CODE- 250/637

## 2019-07-31 PROCEDURE — 82962 GLUCOSE BLOOD TEST: CPT

## 2019-07-31 PROCEDURE — 80048 BASIC METABOLIC PNL TOTAL CA: CPT

## 2019-07-31 PROCEDURE — 85027 COMPLETE CBC AUTOMATED: CPT

## 2019-07-31 PROCEDURE — 36415 COLL VENOUS BLD VENIPUNCTURE: CPT

## 2019-07-31 PROCEDURE — 74011250637 HC RX REV CODE- 250/637: Performed by: FAMILY MEDICINE

## 2019-07-31 RX ORDER — PANTOPRAZOLE SODIUM 40 MG/1
40 TABLET, DELAYED RELEASE ORAL
Status: DISCONTINUED | OUTPATIENT
Start: 2019-07-31 | End: 2019-07-31

## 2019-07-31 RX ORDER — METOPROLOL SUCCINATE 100 MG/1
100 TABLET, EXTENDED RELEASE ORAL DAILY
Status: DISCONTINUED | OUTPATIENT
Start: 2019-08-01 | End: 2019-08-07 | Stop reason: HOSPADM

## 2019-07-31 RX ORDER — LISINOPRIL 5 MG/1
5 TABLET ORAL DAILY
Status: DISCONTINUED | OUTPATIENT
Start: 2019-07-31 | End: 2019-08-03

## 2019-07-31 RX ORDER — PANTOPRAZOLE SODIUM 40 MG/1
40 TABLET, DELAYED RELEASE ORAL
Status: DISCONTINUED | OUTPATIENT
Start: 2019-08-01 | End: 2019-08-07 | Stop reason: HOSPADM

## 2019-07-31 RX ORDER — SODIUM,POTASSIUM PHOSPHATES 280-250MG
1 POWDER IN PACKET (EA) ORAL 4 TIMES DAILY
Status: DISCONTINUED | OUTPATIENT
Start: 2019-07-31 | End: 2019-08-07 | Stop reason: HOSPADM

## 2019-07-31 RX ORDER — PANTOPRAZOLE SODIUM 40 MG/1
TABLET, DELAYED RELEASE ORAL
Status: COMPLETED
Start: 2019-07-31 | End: 2019-07-31

## 2019-07-31 RX ADMIN — MORPHINE SULFATE 2 MG: 2 INJECTION, SOLUTION INTRAMUSCULAR; INTRAVENOUS at 13:59

## 2019-07-31 RX ADMIN — INSULIN LISPRO 6 UNITS: 100 INJECTION, SOLUTION INTRAVENOUS; SUBCUTANEOUS at 08:02

## 2019-07-31 RX ADMIN — Medication 2 G: at 08:03

## 2019-07-31 RX ADMIN — PANTOPRAZOLE SODIUM: 40 TABLET, DELAYED RELEASE ORAL at 02:00

## 2019-07-31 RX ADMIN — INSULIN GLARGINE 50 UNITS: 100 INJECTION, SOLUTION SUBCUTANEOUS at 08:01

## 2019-07-31 RX ADMIN — PRAVASTATIN SODIUM 10 MG: 20 TABLET ORAL at 21:45

## 2019-07-31 RX ADMIN — Medication 10 ML: at 21:49

## 2019-07-31 RX ADMIN — ENOXAPARIN SODIUM 40 MG: 40 INJECTION SUBCUTANEOUS at 10:24

## 2019-07-31 RX ADMIN — MORPHINE SULFATE 2 MG: 2 INJECTION, SOLUTION INTRAMUSCULAR; INTRAVENOUS at 21:56

## 2019-07-31 RX ADMIN — Medication 10 ML: at 13:59

## 2019-07-31 RX ADMIN — Medication 2 G: at 17:06

## 2019-07-31 RX ADMIN — PANTOPRAZOLE SODIUM 40 MG: 40 TABLET, DELAYED RELEASE ORAL at 01:19

## 2019-07-31 RX ADMIN — METOPROLOL TARTRATE 50 MG: 50 TABLET ORAL at 21:45

## 2019-07-31 RX ADMIN — Medication 10 ML: at 05:04

## 2019-07-31 RX ADMIN — METOPROLOL TARTRATE 50 MG: 50 TABLET ORAL at 08:02

## 2019-07-31 RX ADMIN — SODIUM BICARBONATE 650 MG TABLET 650 MG: at 08:02

## 2019-07-31 RX ADMIN — Medication 2 G: at 00:08

## 2019-07-31 RX ADMIN — POTASSIUM & SODIUM PHOSPHATES POWDER PACK 280-160-250 MG 1 PACKET: 280-160-250 PACK at 18:03

## 2019-07-31 RX ADMIN — INSULIN LISPRO 10 UNITS: 100 INJECTION, SOLUTION INTRAVENOUS; SUBCUTANEOUS at 21:47

## 2019-07-31 RX ADMIN — Medication 10 ML: at 09:34

## 2019-07-31 RX ADMIN — MORPHINE SULFATE 2 MG: 2 INJECTION, SOLUTION INTRAMUSCULAR; INTRAVENOUS at 09:32

## 2019-07-31 RX ADMIN — INSULIN LISPRO 10 UNITS: 100 INJECTION, SOLUTION INTRAVENOUS; SUBCUTANEOUS at 11:41

## 2019-07-31 RX ADMIN — INSULIN LISPRO 8 UNITS: 100 INJECTION, SOLUTION INTRAVENOUS; SUBCUTANEOUS at 17:06

## 2019-07-31 RX ADMIN — LISINOPRIL 5 MG: 5 TABLET ORAL at 13:35

## 2019-07-31 RX ADMIN — POTASSIUM & SODIUM PHOSPHATES POWDER PACK 280-160-250 MG 1 PACKET: 280-160-250 PACK at 21:49

## 2019-07-31 NOTE — CONSULTS
[address] Cardiology Consultation        Date of  Admission: 7/28/2019  7:18 PM     Primary Care Physician: Dr. Melissa Prajapati  Primary Cardiologist: none  Referring Physician: Veronica Khan NP for ID  Consulting Physician: Dr. Carole Velasco    CC/Reason for consult: MSSA bacteremia    HPI:  Andrae Dwyer is a 44 y.o. male with h/o DM II and chronic back pain who presented to ED with abdominal pain, fever with chills on 7/28. He was admitted to the ICU for DKA. He also had temp of 100.4 with leukocytosis. BC showed +MSSA and UC also had staph aureus. CT chest showing multiple cavitary lesions and pulmonary was consulted. CT A/P showing L sided perinephric stranding consistent with pyelonephritis. He was started on Ancef and seen by ID. He originally presented to urgent care on 7/12 for dysuria, BS noted to be 279. UC + MSSA but treated with IM CTX and oral Cipro. No recent A1C but serum BS levels >250 most times. His wife states that he was feeling ill one week prior to 7/12 with N/V, chills. Echo showed EF 40-45%, hypokinesis of mid anteroseptal, basal-mid inferoseptal and apical septal wall, no evidence of vegetation. He denies difficulty swallowing, h/o esophageal stricture or varices.       Past Medical History:   Diagnosis Date    Chronic back pain     DM (diabetes mellitus) ([facility] [address] 11/18/2013    GERD (gastroesophageal reflux disease)     HTN (hypertension) 11/18/2013    Sepsis secondary to UTI ([facility] [address] 7/29/2019    Snoring       Past Surgical History:   Procedure Laterality Date    HX ORTHOPAEDIC  2010    right fore foot    HX SEPTOPLASTY         Allergies   Allergen Reactions    Influenza Virus Vaccine, Specific Nausea and Vomiting      Social History     Socioeconomic History    Marital status: SINGLE     Spouse name: Not on file    Number of children: Not on file    Years of education: Not on file    Highest education level: Not on file   Occupational History    Not on file   Social Needs    Financial resource strain: Not on file    Food insecurity:     Worry: Not on file     Inability: Not on file    Transportation needs:     Medical: Not on file     Non-medical: Not on file   Tobacco Use    Smoking status: Never Smoker    Smokeless tobacco: Never Used   Substance and Sexual Activity    Alcohol use: No    Drug use: No    Sexual activity: Not on file   Lifestyle    Physical activity:     Days per week: Not on file     Minutes per session: Not on file    Stress: Not on file   Relationships    Social connections:     Talks on phone: Not on file     Gets together: Not on file     Attends Shinto service: Not on file     Active member of club or organization: Not on file     Attends meetings of clubs or organizations: Not on file     Relationship status: Not on file    Intimate partner violence:     Fear of current or ex partner: Not on file     Emotionally abused: Not on file     Physically abused: Not on file     Forced sexual activity: Not on file   Other Topics Concern    Not on file   Social History Narrative    Not on file     Family History   Problem Relation Age of Onset    Stroke Mother     Heart Disease Father         Current Facility-Administered Medications   Medication Dose Route Frequency    pantoprazole (PROTONIX) tablet 40 mg  40 mg Oral BID PRN    NUTRITIONAL SUPPORT ELECTROLYTE PRN ORDERS   Does Not Apply PRN    ceFAZolin (ANCEF) 2 g/20 mL in sterile water IV syringe  2 g IntraVENous Q8H    insulin glargine (LANTUS) injection 50 Units  50 Units SubCUTAneous DAILY    insulin lispro (HUMALOG) injection 0-10 Units  0-10 Units SubCUTAneous AC&HS    metoprolol tartrate (LOPRESSOR) tablet 50 mg  50 mg Oral Q12H    albuterol (PROVENTIL VENTOLIN) nebulizer solution 2.5 mg  2.5 mg Nebulization Q4H PRN    brompheniramine-pseudoeph-DM (DIMETAPP) 2-30-10 mg/5 mL syrup 5 mL (Patient Supplied)  5 mL Oral QID PRN    fluticasone propionate (FLONASE) 50 mcg/actuation nasal spray 2 Spray  2 Spray Both Nostrils DAILY    pravastatin (PRAVACHOL) tablet 10 mg  10 mg Oral QHS    sertraline (ZOLOFT) tablet 50 mg  50 mg Oral DAILY    enoxaparin (LOVENOX) injection 40 mg  40 mg SubCUTAneous Q24H    sodium chloride (NS) flush 5-10 mL  5-10 mL IntraVENous PRN    dextrose 40% (GLUTOSE) oral gel 1 Tube  15 g Oral PRN    glucagon (GLUCAGEN) injection 1 mg  1 mg IntraMUSCular PRN    dextrose (D50W) injection syrg 12.5-25 g  25-50 mL IntraVENous PRN    sodium chloride (NS) flush 5-40 mL  5-40 mL IntraVENous Q8H    sodium chloride (NS) flush 5-40 mL  5-40 mL IntraVENous PRN    acetaminophen (TYLENOL) tablet 650 mg  650 mg Oral Q4H PRN    naloxone (NARCAN) injection 0.4 mg  0.4 mg IntraVENous PRN    diphenhydrAMINE (BENADRYL) injection 12.5 mg  12.5 mg IntraVENous Q4H PRN    ondansetron (ZOFRAN) injection 4 mg  4 mg IntraVENous Q4H PRN    morphine injection 2 mg  2 mg IntraVENous Q4H PRN       Review of symptoms:  General: no recent weight loss/gain, +weakness, +fatigue, +fever or chills   Skin: no rashes, lumps, or other skin changes   HEENT: no headache, dizziness, lightheadedness, vision changes, hearing changes, tinnitus, vertigo, sinus pressure/pain, bleeding gums, sore throat, or hoarseness   Neck: no swollen glands, goiter, pain or stiffness   Respiratory: + cough, sputum, hemoptysis, +dyspnea, wheezing   Cardiovascular: no chest pain or discomfort, palpitations,+ dyspnea, orthopnea, paroxysmal nocturnal dyspnea, peripheral edema   Gastrointestinal: no trouble swallowing, heartburn, change of appetite, nausea, change in bowel habits, pain with defecation, rectal bleeding or black/tarry stools, hemorrhoids, constipation, diarrhea, +abdominal pain, jaundice, liver or gallbladder problems   Urinary: no frequency, urgency , hematuria, +burning/pain with urination, recent flank pain, polyuria, nocturia, or difficulty urinating   Peripheral Vascular: no claudication, leg cramps, prior DVTs, swelling of calves, legs, or feet, color change, or swelling with redness or tenderness   Musculoskeletal: no muscle or joint pain/stiffness, joint swelling, erythema of joints, or back pain   Psychiatric: no depression, mental disorders, or excessive stress   Neurological: no history of CVA, dizziness, no sensory or motor loss, seizures, syncope, tremors, numbness, tingling, no changes in mood, attention, or speech, no changes in orientation, memory, insight, or judgment. no headache, vertigo. Hematologic: no anemia, easy bruising or bleeding   Endocrine: + diabetes, thyroid problems, heat or cold intolerance, excessive sweating, polyuria, polydipsia        Subjective:   Physical Exam    Visit Vitals  /78 (BP 1 Location: Right arm, BP Patient Position: Head of bed elevated (Comment degrees)) Comment (BP Patient Position): 45   Pulse (!) 101   Temp 99.3 °F (37.4 °C)   Resp 16   Ht 5' 11\" (1.803 m)   Wt 74 kg (163 lb 2.3 oz)   SpO2 100%   BMI 22.75 kg/m²     General Appearance:  Well developed, well nourished, alert and oriented x 3, and individual in no acute distress. Ears/Nose/Mouth/Throat:   Hearing grossly normal.         Neck: Supple. Chest:   Lungs clear to auscultation bilaterally. Cardiovascular:  Regular rate and rhythm, S1, S2 normal, no murmur. Abdomen:   Soft, non-tender, bowel sounds are active. Extremities: No edema bilaterally. Skin: Warm and dry.        Labs:   Recent Results (from the past 24 hour(s))   GLUCOSE, POC    Collection Time: 07/30/19 11:35 AM   Result Value Ref Range    Glucose (POC) 148 (H) 65 - 104 mg/dL   METABOLIC PANEL, BASIC    Collection Time: 07/30/19  3:41 PM   Result Value Ref Range    Sodium 132 (L) 136 - 145 mmol/L    Potassium 4.3 3.5 - 5.1 mmol/L    Chloride 102 98 - 107 mmol/L    CO2 16 (L) 21 - 32 mmol/L    Anion gap 14 7 - 16 mmol/L    Glucose 290 (H) 65 - 100 mg/dL    BUN 9 6 - 23 MG/DL    Creatinine 0.70 (L) 0.8 - 1.5 MG/DL    GFR est AA >60 >60 ml/min/1.73m2    GFR est non-AA >60 >60 ml/min/1.73m2    Calcium 8.5 8.3 - 10.4 MG/DL   GLUCOSE, POC    Collection Time: 07/30/19  4:15 PM   Result Value Ref Range    Glucose (POC) 262 (H) 65 - 100 mg/dL   GLUCOSE, POC    Collection Time: 07/30/19  9:04 PM   Result Value Ref Range    Glucose (POC) 248 (H) 65 - 465 mg/dL   METABOLIC PANEL, BASIC    Collection Time: 07/31/19  5:30 AM   Result Value Ref Range    Sodium 133 (L) 136 - 145 mmol/L    Potassium 4.4 3.5 - 5.1 mmol/L    Chloride 100 98 - 107 mmol/L    CO2 18 (L) 21 - 32 mmol/L    Anion gap 15 7 - 16 mmol/L    Glucose 265 (H) 65 - 100 mg/dL    BUN 16 6 - 23 MG/DL    Creatinine 0.74 (L) 0.8 - 1.5 MG/DL    GFR est AA >60 >60 ml/min/1.73m2    GFR est non-AA >60 >60 ml/min/1.73m2    Calcium 9.0 8.3 - 10.4 MG/DL   MAGNESIUM    Collection Time: 07/31/19  5:30 AM   Result Value Ref Range    Magnesium 2.0 1.8 - 2.4 mg/dL   CBC W/O DIFF    Collection Time: 07/31/19  5:30 AM   Result Value Ref Range    WBC 9.1 4.3 - 11.1 K/uL    RBC 3.97 (L) 4.23 - 5.6 M/uL    HGB 10.4 (L) 13.6 - 17.2 g/dL    HCT 31.6 (L) 41.1 - 50.3 %    MCV 79.6 79.6 - 97.8 FL    MCH 26.2 26.1 - 32.9 PG    MCHC 32.9 31.4 - 35.0 g/dL    RDW 14.8 (H) 11.9 - 14.6 %    PLATELET 626 717 - 657 K/uL    MPV 9.2 (L) 9.4 - 12.3 FL    ABSOLUTE NRBC 0.00 0.0 - 0.2 K/uL   PHOSPHORUS    Collection Time: 07/31/19  5:30 AM   Result Value Ref Range    Phosphorus 2.0 (L) 2.5 - 4.5 MG/DL   GLUCOSE, POC    Collection Time: 07/31/19  7:21 AM   Result Value Ref Range    Glucose (POC) 265 (H) 65 - 100 mg/dL       Pt has been seen and examined by Dr. Fransisco Boo. He agrees with the following assessment and plan.      Assessment/Plan:        Diagnosis    Sepsis due to methicillin susceptible Staphylococcus aureus ([facility] [address]- IV AB per ID, will plan REGGIE to r/o endocarditis on 8/1, NPO after MN tonight and consent for REGGIE    New CM finding of EF 40-45%, hypokinesis of mid anteroseptal, basal-mid inferoseptal and apical septal wall- will need repeat evaluation when improved from current infection likely with ischemic w/u- on Lopressor will need change to Toprol XL in AM, will add ACE-I and diuretics prn    Acute pyelonephritis- IV antibiotics per primary team/ID    Pulmonary cavitary lesion- per pulmonary    DKA (diabetic ketoacidoses) ([facility] [address]- per primary team    DM (diabetes mellitus) ([facility] [address]- per primary team    HTN (hypertension)- per primary team    Mixed hyperlipidemia- per primary team    Unspecified asthma(493.90)- per primary team    Esophageal reflux- PPI       Thank you for consulting [facility] and allowing us to participate in the care of this patient. We will continue to follow along with you.     Nereida Gaines PA-C

## 2019-07-31 NOTE — PROGRESS NOTES
Daniel Aguirre  Admission Date: 7/28/2019             Daily Progress Note: 7/31/2019    The patient's chart is reviewed and the patient is discussed with the staff. Patient is a 44 y.o.  male seen and evaluated at the request of Dr. Angela Phillips for cavitary lesion in the lungs and pulmonary nodules. He arrived via EMS with nausea and vomiting for 2 days. He had recently been treated for UTI completing a week long course of antibiotics but has persisting dysuria. Abdomen was distended. Glucose was 539, anion gap 28, LA 3.35, WBC 20.5, Na 128, creat 1.49. Was tachycardic and admitted to the ICU for DKA and sepsis due to UTI. Placed on Insulin drip and antibiotics added after cultures obtained. Glucose ranges improved. CT of abdomen/pelvis obtained showing pulmonary nodules and suspicious for septic emboli. Chest CT performed with RML and LLL pulmonary nodules, subpleural AMELIA nodule and cavitary nodules. ID has been consulted and ECHO pending.     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 Has a remote history of a staph infection in his foot treated about 6-7 years ago with no further complications. Denies shortness of breath, has an occasional dry cough but no sputum production. Subjective:   awake in bed, denies shortness of breath, states has back pain and getting some relief with morphine.  Room air saturation 98% for REGGIE tomorrow  Echocardiogram today with EF 40-45%, NO vegetation    Current Facility-Administered Medications   Medication Dose Route Frequency    pantoprazole (PROTONIX) tablet 40 mg  40 mg Oral BID PRN    NUTRITIONAL SUPPORT ELECTROLYTE PRN ORDERS   Does Not Apply PRN    ceFAZolin (ANCEF) 2 g/20 mL in sterile water IV syringe  2 g IntraVENous Q8H    insulin glargine (LANTUS) injection 50 Units  50 Units SubCUTAneous DAILY    insulin lispro (HUMALOG) injection 0-10 Units  0-10 Units SubCUTAneous AC&HS    metoprolol tartrate (LOPRESSOR) tablet 50 mg  50 mg Oral Q12H    albuterol (PROVENTIL VENTOLIN) nebulizer solution 2.5 mg  2.5 mg Nebulization Q4H PRN    brompheniramine-pseudoeph-DM (DIMETAPP) 2-30-10 mg/5 mL syrup 5 mL (Patient Supplied)  5 mL Oral QID PRN    fluticasone propionate (FLONASE) 50 mcg/actuation nasal spray 2 Spray  2 Spray Both Nostrils DAILY    pravastatin (PRAVACHOL) tablet 10 mg  10 mg Oral QHS    sertraline (ZOLOFT) tablet 50 mg  50 mg Oral DAILY    enoxaparin (LOVENOX) injection 40 mg  40 mg SubCUTAneous Q24H    sodium chloride (NS) flush 5-10 mL  5-10 mL IntraVENous PRN    dextrose 40% (GLUTOSE) oral gel 1 Tube  15 g Oral PRN    glucagon (GLUCAGEN) injection 1 mg  1 mg IntraMUSCular PRN    dextrose (D50W) injection syrg 12.5-25 g  25-50 mL IntraVENous PRN    sodium chloride (NS) flush 5-40 mL  5-40 mL IntraVENous Q8H    sodium chloride (NS) flush 5-40 mL  5-40 mL IntraVENous PRN    acetaminophen (TYLENOL) tablet 650 mg  650 mg Oral Q4H PRN    naloxone (NARCAN) injection 0.4 mg  0.4 mg IntraVENous PRN    diphenhydrAMINE (BENADRYL) injection 12.5 mg  12.5 mg IntraVENous Q4H PRN    ondansetron (ZOFRAN) injection 4 mg  4 mg IntraVENous Q4H PRN    morphine injection 2 mg  2 mg IntraVENous Q4H PRN       Review of Systems  Constitutional: negative for fever, chills, sweats  Cardiovascular: negative for chest pain, palpitations, syncope, edema  Gastrointestinal:  negative for dysphagia, reflux, vomiting, diarrhea, abdominal pain, or melena  Neurologic:  negative for focal weakness, numbness, headache    Objective:     Vitals:    07/30/19 1902 07/30/19 2307 07/31/19 0441 07/31/19 0817   BP: 123/75 119/82 133/83 148/78   Pulse: (!) 133 98 99 (!) 101   Resp: 20 18  16   Temp: 99 °F (37.2 °C) 98.2 °F (36.8 °C) 97.3 °F (36.3 °C) 99.3 °F (37.4 °C)   SpO2: 95% 96% 95% 100%   Weight:       Height:         Intake and Output:   07/29 1901 - 07/31 0700  In: 480 [P.O.:480]  Out: 3280 [Urine:3280]  07/31 0701 - 07/31 1900  In: 360 [P.O.:360]  Out: -     Physical Exam:   Constitution:  the patient is well developed and in no acute distress  EENMT:  Sclera clear, pupils equal, oral mucosa moist  Respiratory:  BBS clear  Cardiovascular:  tachycardia without M,G,R  Gastrointestinal: soft and non-tender; with positive bowel sounds. Musculoskeletal: warm without cyanosis. There is no lower extremity edema. Skin:  no jaundice or rashes,   Neurologic: no gross neuro deficits     Psychiatric:  alert and oriented x 3      Chest CT 7/29/19:  Right middle and left lower lobe solid pulmonary nodules with central cavitation, the left lower lobe pulmonary nodule measuring approximately 13 x 21 mm. Additional subpleural 4 mm left upper lobe solid pulmonary nodule which is  indeterminate. Given the presence of more than one cavitary pulmonary nodule septic thromboemboli should be considered additionally cavitary pneumonia, pulmonary infarction, Wegener's granulomatosis, or lung carcinoma can present  with cavitary pulmonary nodules. Clinical correlation is advised.       CT abd/pelvis 7/29/19:  1. Left-sided perinephric fat stranding without a perinephric fluid collection to suggest abscess. These findings are suspicious however non diagnostic pyelonephritis. There is a left renal exophytic 2 cm intermediate density mass which isn't completely characterized. No hydronephrosis. Recommend follow-up with CT renal mass protocol when acute symptoms resolve. 2. Left lower lobe subpleural 23 x 11 mm solid pulmonary nodules central cavitation as well as a 8 mm solid pulmonary nodules central cavitation of the right basal anterior segment of the lower lobe. These findings are suspicious for septic emboli given multiplicity and subpleural location however malignancy  cannot be excluded.  Recommend chest CT for further evaluation.     CXR:    7/28/19:  No pulmonary consolidation         LAB  Recent Labs     07/31/19  0721 07/30/19  2104 07/30/19  1615 07/30/19  1135 07/30/19  0942   GLUCPOC 265* 248* 262* 148* 153*      Recent Labs     07/31/19  0530 07/30/19  0321 07/29/19  0333 07/28/19  1933   WBC 9.1 9.1 11.6* 20.5*   HGB 10.4* 10.2* 11.1* 12.6*   HCT 31.6* 31.3* 35.8* 41.6    284 343 493*     Recent Labs     07/31/19  0530 07/30/19  1541 07/30/19  1047 07/30/19  0321 07/30/19  0047  07/28/19  1933   * 132*  --  136 135*   < > 128*   K 4.4 4.3 3.4* 3.3* 3.9   < > 4.8    102  --  107 107   < > 94*   CO2 18* 16*  --  18* 18*   < > 6*   * 290*  --  174* 170*   < > 539*   BUN 16 9  --  7 9   < > 25*   CREA 0.74* 0.70*  --  0.65* 0.73*   < > 1.49   MG 2.0  --   --  1.8 1.9   < >  --    CA 9.0 8.5  --  8.7 8.7   < > 10.1   PHOS 2.0*  --   --   --   --   --   --    ALB  --   --   --   --   --   --  3.3*   TBILI  --   --   --   --   --   --  0.6   ALT  --   --   --   --   --   --  16   SGOT  --   --   --   --   --   --  9*    < > = values in this interval not displayed.      Recent Labs     07/28/19  2010   PHI 7.210*   PCO2I 11.6*   PO2I 136*   HCO3I 4.6*     Recent Labs     07/29/19  0333   LAC 1.6         Assessment:  (Medical Decision Making)     Hospital Problems  Date Reviewed: 7/30/2019          Codes Class Noted POA    Pulmonary cavitary lesion ICD-10-CM: J98.4  ICD-9-CM: 518.89  7/30/2019 Yes        Acute pyelonephritis ICD-10-CM: N10  ICD-9-CM: 590.10  7/30/2019 Yes        * (Principal) Sepsis due to methicillin susceptible Staphylococcus aureus ([place] [address].) ICD-10-CM: A41.01  ICD-9-CM: 038.11, 995.91  7/29/2019 Yes        DKA (diabetic ketoacidoses) ([place] [address] ICD-10-CM: E13.10  ICD-9-CM: 250.10  7/28/2019 Yes        DM (diabetes mellitus) ([place] [address] (Chronic) ICD-10-CM: E11.9  ICD-9-CM: 250.00  11/18/2013 Yes        HTN (hypertension) (Chronic) ICD-10-CM: I10  ICD-9-CM: 401.9  11/18/2013 Yes              Plan:  (Medical Decision Making)     --Ancef x 2  -- + blood culture  And urine culture staphylococcus aureus- ID managing  --Cardiology echocardiogram- no vegetation. REGGIE tomorrow  --will need follow up CT after adequately treated    More than 50% of the time documented was spent in face-to-face contact with the patient and in the care of the patient on the floor/unit where the patient is located. Azul Xiong NP    The patient has been seen and examined by me personally, the chart,labs, and radiographic studies have been reviewed. Chest: CTA  Extremities: no edema  For REGGIE in AM  I agree with the above assessment and plan.     Yasmin Gonzalez MD.

## 2019-07-31 NOTE — PROGRESS NOTES
Interdisciplinary Rounds completed 07/31/19. Nursing, Case Management, Physician and PT present. Plan of care reviewed and updated. Pt with bacteremia will need long term antibiotics. Equal and normal pulses (carotid, femoral, dorsalis pedis)

## 2019-07-31 NOTE — PROGRESS NOTES
Date of Outreach Update:  Deepthi Tan was seen and assessed. MEWS Score: 1 (07/30/19 2307)  Vitals:    07/30/19 1500 07/30/19 1616 07/30/19 1902 07/30/19 2307   BP: (!) 144/92 (!) 137/92 123/75 119/82   Pulse: (!) 114 (!) 118 (!) 133 98   Resp: 27 26 20 18   Temp:  98.2 °F (36.8 °C) 99 °F (37.2 °C) 98.2 °F (36.8 °C)   SpO2: 100% 98% 95% 96%   Weight:       Height:             Pain Assessment  Pain Intensity 1: 8 (07/30/19 2332)  Pain Location 1: Back  Pain Intervention(s) 1: Medication (see MAR)  Patient Stated Pain Goal: 0      Previous Outreach assessment has been reviewed. There have been no significant clinical changes since the completion of the last dated Outreach assessment. Will continue to follow up per outreach protocol.     Signed By:   Ritika Srinivasan RN    July 31, 2019 4:54 AM

## 2019-07-31 NOTE — PROGRESS NOTES
Hourly round completed throughout the shift. All needs met at this time   Bed in lower position and call light/ personal items within reach. Will continue to monitor and give bed side shift report to on coming day shift nurse.

## 2019-07-31 NOTE — PROGRESS NOTES
Hospitalist Note     Admit Date:  2019  7:18 PM   Name:  Debora Hammans   Age:  44 y.o.  :  1970   MRN:  [id number]   PCP:  Florecita Oscar MD  Treatment Team: Attending Provider: Rachel Garza MD; Care Manager: Kylah Mcfadden, RN; Utilization Review: Jesenia Schultz RN; Consulting Provider: Pantera De Souza MD; Consulting Provider: Hollis Paredes MD; Consulting Provider: Francisco J Lomax MD; Primary Nurse: Desire Alfonso RN; Charge Nurse: Jordan Lion RN    Mr. Emma Yost is a 45 y/o M with DM who presented to ER with weakness, chills, abd pain. Met sepsis criteria for possible UTI. Found to be in DKA. Admitted to ICU on rocephin, insulin gtt and IVFs. Blood and urine cultures came back with MSSA bacteremia. Switched to ancef. CT abd ordered for pain and found cavitary lesions in lung, also seen on CT chest.  pulm and ID on board. Echo showed no vegetations, but new onset systolic HF EF 15-21%. Plan for REGGIE by cardiology on 19. HPI/Subjective: On my assessment he was found in no distress. His wife was present and all questions were answered.     Objective:     Patient Vitals for the past 24 hrs:   Temp Pulse Resp BP SpO2   19 0817 99.3 °F (37.4 °C) (!) 101 16 148/78 100 %   19 0441 97.3 °F (36.3 °C) 99  133/83 95 %   19 2307 98.2 °F (36.8 °C) 98 18 119/82 96 %   19 1902 99 °F (37.2 °C) (!) 133 20 123/75 95 %   19 1616 98.2 °F (36.8 °C) (!) 118 26 (!) 137/92 98 %   19 1500  (!) 114 27 (!) 144/92 100 %   19 1400  (!) 117 16 133/80 100 %   19 1300  (!) 112 26 116/70 98 %   19 1200  (!) 112 26 (!) 150/93 100 %   19 1100 99.1 °F (37.3 °C) 100 22 127/79 100 %   19 1000  92 25 133/89 99 %   19 0900  (!) 113 17 123/78 98 %     Oxygen Therapy  O2 Sat (%): 100 % (19 0817)  Pulse via Oximetry: 114 beats per minute (19 1500)  O2 Device: Room air (19 1100)      Intake/Output Summary (Last 24 hours) at 7/31/2019 0827  Last data filed at 7/30/2019 1827  Gross per 24 hour   Intake 480 ml   Output 1500 ml   Net -1020 ml       *Note that automatically entered I/Os may not be accurate; dependent on patient compliance with collection and accurate  by techs. General:    Well nourished. Alert. CV:  reg. No murmur, rub, or gallop. Lungs:   CTAB. No wheezing, rhonchi, or rales. Abdomen:   Soft, non-distended, no rebound, normal BS   Extremities: Warm and dry. No cyanosis or edema. Skin:     No rashes or jaundice.    Neuro:  No gross focal deficits    Data Review:  I have reviewed all labs, meds, and studies from the last 24 hours:    Recent Results (from the past 24 hour(s))   GLUCOSE, POC    Collection Time: 07/30/19  8:37 AM   Result Value Ref Range    Glucose (POC) 151 (H) 65 - 100 mg/dL   GLUCOSE, POC    Collection Time: 07/30/19  9:42 AM   Result Value Ref Range    Glucose (POC) 153 (H) 65 - 100 mg/dL   POTASSIUM    Collection Time: 07/30/19 10:47 AM   Result Value Ref Range    Potassium 3.4 (L) 3.5 - 5.1 mmol/L   GLUCOSE, POC    Collection Time: 07/30/19 11:35 AM   Result Value Ref Range    Glucose (POC) 148 (H) 65 - 221 mg/dL   METABOLIC PANEL, BASIC    Collection Time: 07/30/19  3:41 PM   Result Value Ref Range    Sodium 132 (L) 136 - 145 mmol/L    Potassium 4.3 3.5 - 5.1 mmol/L    Chloride 102 98 - 107 mmol/L    CO2 16 (L) 21 - 32 mmol/L    Anion gap 14 7 - 16 mmol/L    Glucose 290 (H) 65 - 100 mg/dL    BUN 9 6 - 23 MG/DL    Creatinine 0.70 (L) 0.8 - 1.5 MG/DL    GFR est AA >60 >60 ml/min/1.73m2    GFR est non-AA >60 >60 ml/min/1.73m2    Calcium 8.5 8.3 - 10.4 MG/DL   GLUCOSE, POC    Collection Time: 07/30/19  4:15 PM   Result Value Ref Range    Glucose (POC) 262 (H) 65 - 100 mg/dL   GLUCOSE, POC    Collection Time: 07/30/19  9:04 PM   Result Value Ref Range    Glucose (POC) 248 (H) 65 - 065 mg/dL   METABOLIC PANEL, BASIC    Collection Time: 07/31/19  5:30 AM   Result Value Ref Range    Sodium 133 (L) 136 - 145 mmol/L    Potassium 4.4 3.5 - 5.1 mmol/L    Chloride 100 98 - 107 mmol/L    CO2 18 (L) 21 - 32 mmol/L    Anion gap 15 7 - 16 mmol/L    Glucose 265 (H) 65 - 100 mg/dL    BUN 16 6 - 23 MG/DL    Creatinine 0.74 (L) 0.8 - 1.5 MG/DL    GFR est AA >60 >60 ml/min/1.73m2    GFR est non-AA >60 >60 ml/min/1.73m2    Calcium 9.0 8.3 - 10.4 MG/DL   MAGNESIUM    Collection Time: 07/31/19  5:30 AM   Result Value Ref Range    Magnesium 2.0 1.8 - 2.4 mg/dL   CBC W/O DIFF    Collection Time: 07/31/19  5:30 AM   Result Value Ref Range    WBC 9.1 4.3 - 11.1 K/uL    RBC 3.97 (L) 4.23 - 5.6 M/uL    HGB 10.4 (L) 13.6 - 17.2 g/dL    HCT 31.6 (L) 41.1 - 50.3 %    MCV 79.6 79.6 - 97.8 FL    MCH 26.2 26.1 - 32.9 PG    MCHC 32.9 31.4 - 35.0 g/dL    RDW 14.8 (H) 11.9 - 14.6 %    PLATELET 402 817 - 019 K/uL    MPV 9.2 (L) 9.4 - 12.3 FL    ABSOLUTE NRBC 0.00 0.0 - 0.2 K/uL   PHOSPHORUS    Collection Time: 07/31/19  5:30 AM   Result Value Ref Range    Phosphorus 2.0 (L) 2.5 - 4.5 MG/DL   GLUCOSE, POC    Collection Time: 07/31/19  7:21 AM   Result Value Ref Range    Glucose (POC) 265 (H) 65 - 100 mg/dL        All Micro Results     Procedure Component Value Units Date/Time    CULTURE, URINE [[id number]]  (Abnormal)  (Susceptibility) Collected:  07/28/19 1901    Order Status:  Completed Specimen:  Urine from Clean catch Updated:  07/31/19 0730     Special Requests: NO SPECIAL REQUESTS        Culture result:       >100,000 COLONIES/mL STAPHYLOCOCCUS AUREUS          CULTURE, BLOOD [[id number]]  (Abnormal)  (Susceptibility) Collected:  07/28/19 1945    Order Status:  Completed Specimen:  Blood Updated:  07/31/19 0642     Special Requests: --        RIGHT  JUGULAR VEIN       GRAM STAIN       GRAM POS COCCI IN CLUSTERS                  AEROBIC AND ANAEROBIC BOTTLES                  RESULTS VERIFIED, PHONED TO AND READ BACK BY ALFONSO STANTON RN AT [address] [place] [address] Loop ON [id number] MT           Culture result: STAPHYLOCOCCUS AUREUS STAPHYLOCOCCUS AUREUS DETECTED Test Performed by Multiplex PCR                  RESULTS VERIFIED, PHONED TO AND READ BACK BY  ALFONSO STANTON RN AT 4949 ON [id number] MT      CULTURE, BLOOD [[id number]]  (Abnormal)  (Susceptibility) Collected:  07/28/19 2339    Order Status:  Completed Specimen:  Blood Updated:  07/31/19 0642     Special Requests: --        LEFT  HAND       GRAM STAIN       GRAM POS COCCI IN CLUSTERS            AEROBIC BOTTLE POSITIVE               CRITICAL RESULT NOT CALLED DUE TO PREVIOUS NOTIFICATION OF CRITICAL RESULT WITHIN THE LAST 24 HOURS. Culture result: STAPHYLOCOCCUS AUREUS       CULTURE, BLOOD [[id number]] Collected:  07/30/19 1311    Order Status:  Completed Specimen:  Blood Updated:  07/30/19 1326    CULTURE, BLOOD [[id number]] Collected:  07/30/19 1316    Order Status:  Completed Specimen:  Blood Updated:  07/30/19 1326          Results for orders placed or performed during the hospital encounter of 07/28/19   2D ECHO COMPLETE ADULT (TTE) W OR WO CONTR    Narrative    Report creation error on 7/30/2019 12:03:58 PM    An error occurred while generating this report. The most common cause of such  errors are incomplete, duplicate, or corrupted findings. To fix this error:  return to the Outline Viewer, click the Tools button, and select \"Check  findings\". If the problem persists, contact your IS .        Current Meds:  Current Facility-Administered Medications   Medication Dose Route Frequency    pantoprazole (PROTONIX) tablet 40 mg  40 mg Oral BID PRN    NUTRITIONAL SUPPORT ELECTROLYTE PRN ORDERS   Does Not Apply PRN    ceFAZolin (ANCEF) 2 g/20 mL in sterile water IV syringe  2 g IntraVENous Q8H    insulin glargine (LANTUS) injection 50 Units  50 Units SubCUTAneous DAILY    insulin lispro (HUMALOG) injection 0-10 Units  0-10 Units SubCUTAneous AC&HS    metoprolol tartrate (LOPRESSOR) tablet 50 mg  50 mg Oral Q12H    albuterol (PROVENTIL VENTOLIN) nebulizer solution 2.5 mg  2.5 mg Nebulization Q4H PRN    brompheniramine-pseudoeph-DM (DIMETAPP) 2-30-10 mg/5 mL syrup 5 mL (Patient Supplied)  5 mL Oral QID PRN    fluticasone propionate (FLONASE) 50 mcg/actuation nasal spray 2 Spray  2 Spray Both Nostrils DAILY    pravastatin (PRAVACHOL) tablet 10 mg  10 mg Oral QHS    sertraline (ZOLOFT) tablet 50 mg  50 mg Oral DAILY    enoxaparin (LOVENOX) injection 40 mg  40 mg SubCUTAneous Q24H    sodium chloride (NS) flush 5-10 mL  5-10 mL IntraVENous PRN    dextrose 40% (GLUTOSE) oral gel 1 Tube  15 g Oral PRN    glucagon (GLUCAGEN) injection 1 mg  1 mg IntraMUSCular PRN    dextrose (D50W) injection syrg 12.5-25 g  25-50 mL IntraVENous PRN    sodium chloride (NS) flush 5-40 mL  5-40 mL IntraVENous Q8H    sodium chloride (NS) flush 5-40 mL  5-40 mL IntraVENous PRN    acetaminophen (TYLENOL) tablet 650 mg  650 mg Oral Q4H PRN    naloxone (NARCAN) injection 0.4 mg  0.4 mg IntraVENous PRN    diphenhydrAMINE (BENADRYL) injection 12.5 mg  12.5 mg IntraVENous Q4H PRN    ondansetron (ZOFRAN) injection 4 mg  4 mg IntraVENous Q4H PRN    morphine injection 2 mg  2 mg IntraVENous Q4H PRN       Other Studies (last 24 hours):  No results found.     Assessment and Plan:     Hospital Problems as of 7/31/2019 Date Reviewed: 7/30/2019          Codes Class Noted - Resolved POA    Pulmonary cavitary lesion ICD-10-CM: J98.4  ICD-9-CM: 518.89  7/30/2019 - Present Yes        Acute pyelonephritis ICD-10-CM: N10  ICD-9-CM: 590.10  7/30/2019 - Present Yes        * (Principal) Sepsis due to methicillin susceptible Staphylococcus aureus ([facility] ICD-10-CM: A41.01  ICD-9-CM: 038.11, 995.91  7/29/2019 - Present Yes        DKA (diabetic ketoacidoses) ([facility] ICD-10-CM: E13.10  ICD-9-CM: 250.10  7/28/2019 - Present Yes        DM (diabetes mellitus) ([facility] (Chronic) ICD-10-CM: E11.9  ICD-9-CM: 250.00  11/18/2013 - Present Yes        HTN (hypertension) (Chronic) ICD-10-CM: I10  ICD-9-CM: 401.9  11/18/2013 - Present Yes              Plan:    -DKA: resolved  On lantus and humalog SS  holding home PO meds  DM management following    -MSSA bacteremia / UTI:   -Cavitary pulmonary lesions, possible septic emboli:  Continue ancef  Pulmonary and ID on board  Plan for REGGIE tomorrow to RO endocarditis    -New, systolic HF EF 30-64%  Started on BB, ACEI  Cardiology on board     -Hypophosphatemia: start neutra phosp     DC planning/Dispo:    -likely home when improved    Diet:  DIET DIABETIC CONSISTENT CARB  DVT ppx:  lovenox    Signed:  Murphy Orr MD

## 2019-07-31 NOTE — DIABETES MGMT
Girlfriend at bedside. . Educated pt re: current basal/bolus regimen of Lantus 50 units daily and Humalog sliding scale coverage 4x/day ac and hs, including type of insulin, timing with meals, onset, duration of effect, and peak of insulin dose. Pt will need reinforcement and review. Pt unable to name the medications that he takes at home, calling them, \"My A1c shot, my A1c pills\". Pt states that he uses an insulin pen at home. Reviewed insulin pen injection technique and insulin pen storage. Pt states that he removes the pen needle after each injection and rotates sites, but was putting the \"in use\" insulin pen back into the refrigerator. Pt given , \"How to use your Lantus Solostar Pen\" instruction booklet. Pt states that his girlfriend is also diabetic and has read through all the diabetes information that was provided. Explained basic physiology of diabetes, as well as causes, s/s, and treatments for hypoglycemia and hyperglycemia. Described the effects of poor glycemic control on the development of long-term complications such as renal, eye, nerve, and cardiovascular disease. Educated re: effects of carbohydrates on blood glucose, the \"plate method\" of healthy meal planning, basics of healthy meal plan, and Consistent Carbohydrate Diet. Pt states that they only drink unsweetened beverages. Stressed the importance of follow up care for diabetes management with PCP and to bring blood glucose log book to PCP appointments to assist with medication titration. Discussed target goals for HbA1c and blood glucose and the significance of an HbA1c of 11.9 (eA). Discussed the relationship between infection and hyperglycemia. Pt would benefit from continued outpatient diabetes education. [facility] given to pt and girlfriend. Pt and girlfriend have no further questions at this time.

## 2019-07-31 NOTE — PROGRESS NOTES
Dr. Poppy Alvarez at nurses station, informed per pt,  he no longer takes Zoloft, per pt has not taken since 2016, also informed MD, pt takes Prilosec 40mg daily per PTA med list, order from previous shift, Protonix prn. Verbal orders received from Dr. Poppy Alvarez, discontinue Zoloft, change Protonix 40mg po to daily.

## 2019-07-31 NOTE — PROGRESS NOTES
Infectious Disease Note    Today's Date: 2019   Admit Date: 2019    Impression:   · MSSA bacteremia () with pulmonary emboli and L pyelonephritis. No source identified. · Uncontrolled DMII    Plan:   ·  Continue Ancef. · Follow repeat BCs  · Will ask cardiology for REGGIE evaluation due to pulmonary emboli and negative TTE    Anti-infectives:   · Ancef (-      Subjective:   Doing well, wife at bedside. ID update given. Allergies   Allergen Reactions    Influenza Virus Vaccine, Specific Nausea and Vomiting        Review of Systems:  A comprehensive review of systems was negative except for that written in the History of Present Illness. Objective:     Visit Vitals  /78 (BP 1 Location: Right arm, BP Patient Position: Head of bed elevated (Comment degrees))   Pulse (!) 101   Temp 99.3 °F (37.4 °C)   Resp 16   Ht 5' 11\" (1.803 m)   Wt 74 kg (163 lb 2.3 oz)   SpO2 100%   BMI 22.75 kg/m²     Temp (24hrs), Av.5 °F (36.9 °C), Min:97.3 °F (36.3 °C), Max:99.3 °F (37.4 °C)       Lines:  Peripheral IV:       Physical Exam:    General:  Alert, cooperative, appears stated age   Eyes:  Sclera anicteric. Pupils equally round and reactive to light. Mouth/Throat: Mucous membranes normal, oral pharynx clear   Neck: Supple   Lungs:   Clear to auscultation bilaterally, good effort   CV:  Regular rate and rhythm,no murmur, click, rub or gallop   Abdomen:   Soft, non-tender.  bowel sounds normal. non-distended. + L CVA tenderness    Extremities: No cyanosis or edema   Skin: Skin color, texture, turgor normal. no acute rash or lesions   Lymph nodes: Cervical and supraclavicular normal   Musculoskeletal: No swelling or deformity   Lines/Devices:  Intact, no erythema, drainage or tenderness   Psych: Alert and oriented, normal mood affect given the setting       Data Review:     CBC:  Recent Labs     19  0530 19  0321 19  0333   WBC 9.1 9.1 11.6*   GRANS  --  80* 86*   MONOS  --  9 9 EOS  --  1 0*   ANEU  --  7.3 10.0*   ABL  --  0.8 0.5   HGB 10.4* 10.2* 11.1*   HCT 31.6* 31.3* 35.8*    284 343       BMP:  Recent Labs     07/31/19  0530 07/30/19  1541 07/30/19  1047 07/30/19  0321   CREA 0.74* 0.70*  --  0.65*   BUN 16 9  --  7   * 132*  --  136   K 4.4 4.3 3.4* 3.3*    102  --  107   CO2 18* 16*  --  18*   AGAP 15 14  --  11   * 290*  --  174*       LFTS:  Recent Labs     07/28/19  1933   TBILI 0.6   ALT 16   SGOT 9*      TP 10.7*   ALB 3.3*       Microbiology:     All Micro Results     Procedure Component Value Units Date/Time    CULTURE, URINE [[id number]]  (Abnormal)  (Susceptibility) Collected:  07/28/19 1901    Order Status:  Completed Specimen:  Urine from Clean catch Updated:  07/31/19 0730     Special Requests: NO SPECIAL REQUESTS        Culture result:       >100,000 COLONIES/mL STAPHYLOCOCCUS AUREUS          CULTURE, BLOOD [[id number]]  (Abnormal)  (Susceptibility) Collected:  07/28/19 1945    Order Status:  Completed Specimen:  Blood Updated:  07/31/19 0642     Special Requests: --        RIGHT  JUGULAR VEIN       GRAM STAIN       GRAM POS COCCI IN CLUSTERS                  AEROBIC AND ANAEROBIC BOTTLES                  RESULTS VERIFIED, PHONED TO AND READ BACK BY ALFONSO [address] RN AT [facility] [address] [place] [id number] [place]           Culture result: STAPHYLOCOCCUS AUREUS               STAPHYLOCOCCUS AUREUS DETECTED Test Performed by Multiplex PCR                  RESULTS VERIFIED, PHONED TO AND READ BACK BY  ALFONSO [address] RN AT [address] [id number] MT      CULTURE, BLOOD [[id number]]  (Abnormal)  (Susceptibility) Collected:  07/28/19 2339    Order Status:  Completed Specimen:  Blood Updated:  07/31/19 0642     Special Requests: --        LEFT  HAND       GRAM STAIN       GRAM POS COCCI IN CLUSTERS            AEROBIC BOTTLE POSITIVE               CRITICAL RESULT NOT CALLED DUE TO PREVIOUS NOTIFICATION OF CRITICAL RESULT WITHIN THE LAST 24 HOURS.            Culture result: STAPHYLOCOCCUS AUREUS       CULTURE, BLOOD [[id number]] Collected:  07/30/19 1311    Order Status:  Completed Specimen:  Blood Updated:  07/30/19 1326    CULTURE, BLOOD [[id number]] Collected:  07/30/19 1316    Order Status:  Completed Specimen:  Blood Updated:  07/30/19 1326          Imaging:   See [facility] [place]/EPIC     Signed By: Katt Benz, NP     July 31, 2019

## 2019-07-31 NOTE — PROGRESS NOTES
[facility] CRITICAL CARE OUTREACH NURSE PROGRESS REPORT      SUBJECTIVE: Called to assess patient secondary to recent transfer from ICU. MEWS Score: 1 (07/30/19 2307)  Vitals:    07/30/19 1902 07/30/19 2307 07/31/19 0441 07/31/19 0817   BP: 123/75 119/82 133/83 148/78   Pulse: (!) 133 98 99 (!) 101   Resp: 20 18  16   Temp: 99 °F (37.2 °C) 98.2 °F (36.8 °C) 97.3 °F (36.3 °C) 99.3 °F (37.4 °C)   SpO2: 95% 96% 95% 100%   Weight:       Height:          LAB DATA:    Recent Labs     07/31/19  0530 07/30/19  1541 07/30/19  1047 07/30/19  0321 07/30/19  0047  07/28/19  1933   * 132*  --  136 135*   < > 128*   K 4.4 4.3 3.4* 3.3* 3.9   < > 4.8    102  --  107 107   < > 94*   CO2 18* 16*  --  18* 18*   < > 6*   AGAP 15 14  --  11 10   < > 28*   * 290*  --  174* 170*   < > 539*   BUN 16 9  --  7 9   < > 25*   CREA 0.74* 0.70*  --  0.65* 0.73*   < > 1.49   GFRAA >60 >60  --  >60 >60   < > >60   GFRNA >60 >60  --  >60 >60   < > 53*   CA 9.0 8.5  --  8.7 8.7   < > 10.1   MG 2.0  --   --  1.8 1.9   < >  --    PHOS 2.0*  --   --   --   --   --   --    ALB  --   --   --   --   --   --  3.3*   TP  --   --   --   --   --   --  10.7*   GLOB  --   --   --   --   --   --  7.4*   AGRAT  --   --   --   --   --   --  0.4*   ALT  --   --   --   --   --   --  16    < > = values in this interval not displayed. Recent Labs     07/31/19  0530 07/30/19  0321 07/29/19  0333   WBC 9.1 9.1 11.6*   HGB 10.4* 10.2* 11.1*   HCT 31.6* 31.3* 35.8*    284 343          OBJECTIVE: On arrival to room, I found patient to be resting in bed. Pain Assessment  Pain Intensity 1: 8 (07/30/19 2332)  Pain Location 1: Back  Pain Intervention(s) 1: Medication (see MAR)  Patient Stated Pain Goal: 0                                 ASSESSMENT:  Pt is alert and oriented eating breakfast. , spo2 98% on RA, lungs cta, last bgl 260s. 50 units of Lantus given.  Pt is complaining of acid reflux - encouraged to ask for his prn med. Denies needs at this time. PLAN:  Continue to monitor per outreach protocol.

## 2019-07-31 NOTE — PROGRESS NOTES
Hourly rounds completed this shift, will give report to oncoming nurse. Pt sitting up in bed eating dinner, wife at bedside.

## 2019-07-31 NOTE — PROGRESS NOTES
Date of Outreach Update:  Harmony Shrestha was seen and assessed. Patient alert, OOB to bathroom at this time. Complains of acid reflux and pain in shoulder. Blood sugars remain >200, received 4 units humalog SSI per orders. Primary RN notified of complaints. No distress noted at this time. MEWS Score: 4 (07/30/19 1902)  Vitals:    07/30/19 1500 07/30/19 1616 07/30/19 1902 07/30/19 2307   BP: (!) 144/92 (!) 137/92 123/75 119/82   Pulse: (!) 114 (!) 118 (!) 133 98   Resp: 27 26 20 18   Temp:  98.2 °F (36.8 °C) 99 °F (37.2 °C) 98.2 °F (36.8 °C)   SpO2: 100% 98% 95% 96%   Weight:       Height:             Pain Assessment  Pain Intensity 1: 8 (07/30/19 2332)  Pain Location 1: Back  Pain Intervention(s) 1: Medication (see MAR)  Patient Stated Pain Goal: 0      Previous Outreach assessment has been reviewed. There have been no significant clinical changes since the completion of the last dated Outreach assessment. Will continue to follow up per outreach protocol.     Signed By:   Krystal Haro RN    July 31, 2019 10:58 AM

## 2019-08-01 PROBLEM — R78.81 BACTEREMIA DUE TO METHICILLIN SUSCEPTIBLE STAPHYLOCOCCUS AUREUS (MSSA): Status: ACTIVE | Noted: 2019-08-01

## 2019-08-01 PROBLEM — I26.90 ACUTE SEPTIC PULMONARY EMBOLISM WITHOUT ACUTE COR PULMONALE (HCC): Status: ACTIVE | Noted: 2019-08-01

## 2019-08-01 PROBLEM — B95.61 BACTEREMIA DUE TO METHICILLIN SUSCEPTIBLE STAPHYLOCOCCUS AUREUS (MSSA): Status: ACTIVE | Noted: 2019-08-01

## 2019-08-01 LAB
ANION GAP SERPL CALC-SCNC: 12 MMOL/L (ref 7–16)
BACTERIA SPEC CULT: ABNORMAL
BUN SERPL-MCNC: 16 MG/DL (ref 6–23)
CALCIUM SERPL-MCNC: 8.9 MG/DL (ref 8.3–10.4)
CHLORIDE SERPL-SCNC: 97 MMOL/L (ref 98–107)
CO2 SERPL-SCNC: 21 MMOL/L (ref 21–32)
CREAT SERPL-MCNC: 0.66 MG/DL (ref 0.8–1.5)
ERYTHROCYTE [DISTWIDTH] IN BLOOD BY AUTOMATED COUNT: 14.9 % (ref 11.9–14.6)
GLUCOSE BLD STRIP.AUTO-MCNC: 252 MG/DL (ref 65–100)
GLUCOSE BLD STRIP.AUTO-MCNC: 319 MG/DL (ref 65–100)
GLUCOSE BLD STRIP.AUTO-MCNC: 375 MG/DL (ref 65–100)
GLUCOSE BLD STRIP.AUTO-MCNC: 477 MG/DL (ref 65–100)
GLUCOSE SERPL-MCNC: 286 MG/DL (ref 65–100)
GRAM STN SPEC: ABNORMAL
HCT VFR BLD AUTO: 32.9 % (ref 41.1–50.3)
HGB BLD-MCNC: 10.7 G/DL (ref 13.6–17.2)
MCH RBC QN AUTO: 26 PG (ref 26.1–32.9)
MCHC RBC AUTO-ENTMCNC: 32.5 G/DL (ref 31.4–35)
MCV RBC AUTO: 80 FL (ref 79.6–97.8)
NRBC # BLD: 0 K/UL (ref 0–0.2)
PLATELET # BLD AUTO: 310 K/UL (ref 150–450)
PMV BLD AUTO: 9.8 FL (ref 9.4–12.3)
POTASSIUM SERPL-SCNC: 3.8 MMOL/L (ref 3.5–5.1)
RBC # BLD AUTO: 4.11 M/UL (ref 4.23–5.6)
SERVICE CMNT-IMP: ABNORMAL
SERVICE CMNT-IMP: ABNORMAL
SODIUM SERPL-SCNC: 130 MMOL/L (ref 136–145)
WBC # BLD AUTO: 9.6 K/UL (ref 4.3–11.1)

## 2019-08-01 PROCEDURE — 87077 CULTURE AEROBIC IDENTIFY: CPT

## 2019-08-01 PROCEDURE — 99232 SBSQ HOSP IP/OBS MODERATE 35: CPT | Performed by: INTERNAL MEDICINE

## 2019-08-01 PROCEDURE — 74011250636 HC RX REV CODE- 250/636

## 2019-08-01 PROCEDURE — 74011250637 HC RX REV CODE- 250/637: Performed by: INTERNAL MEDICINE

## 2019-08-01 PROCEDURE — 36415 COLL VENOUS BLD VENIPUNCTURE: CPT

## 2019-08-01 PROCEDURE — B246ZZ4 ULTRASONOGRAPHY OF RIGHT AND LEFT HEART, TRANSESOPHAGEAL: ICD-10-PCS | Performed by: INTERNAL MEDICINE

## 2019-08-01 PROCEDURE — 74011636637 HC RX REV CODE- 636/637: Performed by: HOSPITALIST

## 2019-08-01 PROCEDURE — 87186 SC STD MICRODIL/AGAR DIL: CPT

## 2019-08-01 PROCEDURE — 85027 COMPLETE CBC AUTOMATED: CPT

## 2019-08-01 PROCEDURE — 93312 ECHO TRANSESOPHAGEAL: CPT

## 2019-08-01 PROCEDURE — 87641 MR-STAPH DNA AMP PROBE: CPT

## 2019-08-01 PROCEDURE — 74011636637 HC RX REV CODE- 636/637: Performed by: INTERNAL MEDICINE

## 2019-08-01 PROCEDURE — 82962 GLUCOSE BLOOD TEST: CPT

## 2019-08-01 PROCEDURE — 99153 MOD SED SAME PHYS/QHP EA: CPT

## 2019-08-01 PROCEDURE — 87040 BLOOD CULTURE FOR BACTERIA: CPT

## 2019-08-01 PROCEDURE — 87205 SMEAR GRAM STAIN: CPT

## 2019-08-01 PROCEDURE — 99152 MOD SED SAME PHYS/QHP 5/>YRS: CPT

## 2019-08-01 PROCEDURE — 74011250636 HC RX REV CODE- 250/636: Performed by: INTERNAL MEDICINE

## 2019-08-01 PROCEDURE — 02HV33Z INSERTION OF INFUSION DEVICE INTO SUPERIOR VENA CAVA, PERCUTANEOUS APPROACH: ICD-10-PCS | Performed by: INTERNAL MEDICINE

## 2019-08-01 PROCEDURE — 65660000000 HC RM CCU STEPDOWN

## 2019-08-01 PROCEDURE — 80048 BASIC METABOLIC PNL TOTAL CA: CPT

## 2019-08-01 RX ORDER — INSULIN GLARGINE 100 [IU]/ML
60 INJECTION, SOLUTION SUBCUTANEOUS ONCE
Status: COMPLETED | OUTPATIENT
Start: 2019-08-01 | End: 2019-08-01

## 2019-08-01 RX ORDER — MIDAZOLAM HYDROCHLORIDE 1 MG/ML
.5-5 INJECTION, SOLUTION INTRAMUSCULAR; INTRAVENOUS
Status: DISCONTINUED | OUTPATIENT
Start: 2019-08-01 | End: 2019-08-07 | Stop reason: HOSPADM

## 2019-08-01 RX ORDER — INSULIN LISPRO 100 [IU]/ML
8 INJECTION, SOLUTION INTRAVENOUS; SUBCUTANEOUS
Status: DISCONTINUED | OUTPATIENT
Start: 2019-08-01 | End: 2019-08-02

## 2019-08-01 RX ORDER — INSULIN GLARGINE 100 [IU]/ML
60 INJECTION, SOLUTION SUBCUTANEOUS DAILY
Status: DISCONTINUED | OUTPATIENT
Start: 2019-08-02 | End: 2019-08-04

## 2019-08-01 RX ORDER — FENTANYL CITRATE 50 UG/ML
25-100 INJECTION, SOLUTION INTRAMUSCULAR; INTRAVENOUS
Status: DISCONTINUED | OUTPATIENT
Start: 2019-08-01 | End: 2019-08-07 | Stop reason: HOSPADM

## 2019-08-01 RX ORDER — INSULIN LISPRO 100 [IU]/ML
5 INJECTION, SOLUTION INTRAVENOUS; SUBCUTANEOUS ONCE
Status: COMPLETED | OUTPATIENT
Start: 2019-08-01 | End: 2019-08-01

## 2019-08-01 RX ADMIN — INSULIN LISPRO 10 UNITS: 100 INJECTION, SOLUTION INTRAVENOUS; SUBCUTANEOUS at 16:54

## 2019-08-01 RX ADMIN — INSULIN LISPRO 5 UNITS: 100 INJECTION, SOLUTION INTRAVENOUS; SUBCUTANEOUS at 22:24

## 2019-08-01 RX ADMIN — POTASSIUM & SODIUM PHOSPHATES POWDER PACK 280-160-250 MG 1 PACKET: 280-160-250 PACK at 22:00

## 2019-08-01 RX ADMIN — PANTOPRAZOLE SODIUM 40 MG: 40 TABLET, DELAYED RELEASE ORAL at 05:06

## 2019-08-01 RX ADMIN — Medication 2 G: at 11:18

## 2019-08-01 RX ADMIN — Medication 10 ML: at 14:01

## 2019-08-01 RX ADMIN — INSULIN LISPRO 4 UNITS: 100 INJECTION, SOLUTION INTRAVENOUS; SUBCUTANEOUS at 08:34

## 2019-08-01 RX ADMIN — FENTANYL CITRATE 50 MCG: 50 INJECTION, SOLUTION INTRAMUSCULAR; INTRAVENOUS at 09:25

## 2019-08-01 RX ADMIN — Medication 2 G: at 01:35

## 2019-08-01 RX ADMIN — POTASSIUM & SODIUM PHOSPHATES POWDER PACK 280-160-250 MG 1 PACKET: 280-160-250 PACK at 17:06

## 2019-08-01 RX ADMIN — MIDAZOLAM HYDROCHLORIDE 2 MG: 1 INJECTION, SOLUTION INTRAMUSCULAR; INTRAVENOUS at 09:25

## 2019-08-01 RX ADMIN — Medication 2 G: at 17:42

## 2019-08-01 RX ADMIN — Medication 10 ML: at 21:51

## 2019-08-01 RX ADMIN — ACETAMINOPHEN 650 MG: 325 TABLET, FILM COATED ORAL at 15:43

## 2019-08-01 RX ADMIN — INSULIN LISPRO 10 UNITS: 100 INJECTION, SOLUTION INTRAVENOUS; SUBCUTANEOUS at 22:25

## 2019-08-01 RX ADMIN — PRAVASTATIN SODIUM 10 MG: 20 TABLET ORAL at 22:00

## 2019-08-01 RX ADMIN — INSULIN GLARGINE 60 UNITS: 100 INJECTION, SOLUTION SUBCUTANEOUS at 12:14

## 2019-08-01 RX ADMIN — ONDANSETRON 4 MG: 2 INJECTION INTRAMUSCULAR; INTRAVENOUS at 01:35

## 2019-08-01 RX ADMIN — INSULIN LISPRO 8 UNITS: 100 INJECTION, SOLUTION INTRAVENOUS; SUBCUTANEOUS at 12:13

## 2019-08-01 RX ADMIN — INSULIN LISPRO 5 UNITS: 100 INJECTION, SOLUTION INTRAVENOUS; SUBCUTANEOUS at 16:55

## 2019-08-01 RX ADMIN — Medication 10 ML: at 05:06

## 2019-08-01 RX ADMIN — FENTANYL CITRATE 50 MCG: 50 INJECTION, SOLUTION INTRAMUSCULAR; INTRAVENOUS at 09:12

## 2019-08-01 RX ADMIN — MIDAZOLAM HYDROCHLORIDE 2 MG: 1 INJECTION, SOLUTION INTRAMUSCULAR; INTRAVENOUS at 09:36

## 2019-08-01 RX ADMIN — ACETAMINOPHEN 650 MG: 325 TABLET, FILM COATED ORAL at 20:01

## 2019-08-01 RX ADMIN — MIDAZOLAM HYDROCHLORIDE 2 MG: 1 INJECTION, SOLUTION INTRAMUSCULAR; INTRAVENOUS at 09:12

## 2019-08-01 RX ADMIN — INSULIN LISPRO 6 UNITS: 100 INJECTION, SOLUTION INTRAVENOUS; SUBCUTANEOUS at 12:12

## 2019-08-01 RX ADMIN — POTASSIUM & SODIUM PHOSPHATES POWDER PACK 280-160-250 MG 1 PACKET: 280-160-250 PACK at 12:23

## 2019-08-01 NOTE — PROGRESS NOTES
REGGIE with Dr Melvina Juárez  ASA II Mallampati II  Versed 6mg  Fentanyl 100mcg  Pt tolerated procedure well

## 2019-08-01 NOTE — PROGRESS NOTES
[facility] CRITICAL CARE OUTREACH NURSE PROGRESS REPORT      SUBJECTIVE: Called to assess patient secondary to [employer] program protocol. MEWS Score: 1 (07/31/19 1704)  Vitals:    07/31/19 1333 07/31/19 1529 07/31/19 1704 07/31/19 1948   BP: 144/85  115/65 102/61   Pulse: (!) 109  90 (!) 103   Resp: 18 17 18   Temp: 97.6 °F (36.4 °C)  99.3 °F (37.4 °C) 98.2 °F (36.8 °C)   SpO2: 98%  98% 98%   Weight:  75.3 kg (166 lb)     Height:               LAB DATA:    Recent Labs     07/31/19  0530 07/30/19  1541 07/30/19  1047 07/30/19  0321 07/30/19  0047   * 132*  --  136 135*   K 4.4 4.3 3.4* 3.3* 3.9    102  --  107 107   CO2 18* 16*  --  18* 18*   AGAP 15 14  --  11 10   * 290*  --  174* 170*   BUN 16 9  --  7 9   CREA 0.74* 0.70*  --  0.65* 0.73*   GFRAA >60 >60  --  >60 >60   GFRNA >60 >60  --  >60 >60   CA 9.0 8.5  --  8.7 8.7   MG 2.0  --   --  1.8 1.9   PHOS 2.0*  --   --   --   --         Recent Labs     07/31/19  0530 07/30/19  0321 07/29/19  0333   WBC 9.1 9.1 11.6*   HGB 10.4* 10.2* 11.1*   HCT 31.6* 31.3* 35.8*    284 343          OBJECTIVE: On arrival to room, I found patient to be laying in bed. Pain Assessment  Pain Intensity 1: 9 (07/31/19 1359)  Pain Location 1: Back, Shoulder  Pain Intervention(s) 1: Medication (see MAR)  Patient Stated Pain Goal: 0                                 ASSESSMENT:  Pt alert and oriented, no pain or distress noted. Lungs CTA. 02 sat 94% on room air. PLAN:  Will continue to follow per outreach program protocol.      Brittny Meza RN

## 2019-08-01 NOTE — PROGRESS NOTES
Interdisciplinary Rounds completed 08/01/19. Nursing, Case Management, Physician and PT present. Plan of care reviewed and updated. REGGIE negative. Waiting on negative blood cultures to place central line.

## 2019-08-01 NOTE — PROGRESS NOTES
Date of Outreach Update:  Cole Salguero was seen and assessed. MEWS Score: 2 (07/31/19 2323)  Vitals:    08/01/19 1000 08/01/19 1010 08/01/19 1142 08/01/19 1603   BP: 109/69 111/71 124/66 118/72   Pulse: (!) 114 (!) 113 (!) 102 100   Resp: 25 24 22 24   Temp:   98.7 °F (37.1 °C) 98.6 °F (37 °C)   SpO2: 96% 95% 98% 97%   Weight:       Height:             Pain Assessment  Pain Intensity 1: 8 (08/01/19 1543)  Pain Location 1: Back  Pain Intervention(s) 1: Medication (see MAR)  Patient Stated Pain Goal: 4      Previous Outreach assessment has been reviewed. There have been no significant clinical changes since the completion of the last dated Outreach assessment. Will continue to follow up per outreach protocol.     Signed By:   Laly Collins RN    August 1, 2019 6:38 PM

## 2019-08-01 NOTE — PROGRESS NOTES
TRANSFER - OUT REPORT:    Verbal report given to Nallely Hernandez RN(name) on Deneen Carreon  being transferred to room 618(unit) for routine progression of care       Report consisted of patients Situation, Background, Assessment and   Recommendations(SBAR). Information from the following report(s) Procedure Summary was reviewed with the receiving nurse. Lines:   Peripheral IV 07/28/19 Right Hand (Active)   Site Assessment Clean, dry, & intact 8/1/2019  7:01 AM   Phlebitis Assessment 0 8/1/2019  7:01 AM   Infiltration Assessment 0 8/1/2019  7:01 AM   Dressing Status Clean, dry, & intact 8/1/2019  7:01 AM   Dressing Type Tape;Transparent 8/1/2019  7:01 AM   Hub Color/Line Status Flushed 8/1/2019  7:01 AM   Alcohol Cap Used No 7/31/2019 11:42 AM       Peripheral IV 07/29/19 Right Antecubital (Active)   Site Assessment Clean, dry, & intact 8/1/2019  7:01 AM   Phlebitis Assessment 0 8/1/2019  7:01 AM   Infiltration Assessment 0 8/1/2019  7:01 AM   Dressing Status Clean, dry, & intact 8/1/2019  7:01 AM   Dressing Type Tape;Transparent 8/1/2019  7:01 AM   Hub Color/Line Status Patent; Flushed 8/1/2019  7:01 AM   Alcohol Cap Used No 7/31/2019  1:59 PM        Opportunity for questions and clarification was provided.

## 2019-08-01 NOTE — PROGRESS NOTES
Cole Salguero  Admission Date: 7/28/2019             Daily Progress Note: 8/1/2019    The patient's chart is reviewed and the patient is discussed with the staff. Patient is [phone] y.o.  male seen and evaluated at the request of Dr. Jerica Blanco for cavitary lesion in the lungs and pulmonary nodules.  He arrived via EMS with nausea and vomiting for 2 days. [facility] had recently been treated for UTI completing a week long course of antibiotics but has persisting dysuria.  Abdomen was distended.  Glucose was 539, anion gap 28, LA 3.35, WBC 20.5, Na 128, creat 1.49.  Was tachycardic and admitted to the ICU for DKA and sepsis due to UTI.  Placed on Insulin drip and antibiotics added after cultures obtained.  Glucose ranges improved.  CT of abdomen/pelvis obtained showing pulmonary nodules and suspicious for septic emboli.  Chest CT performed with RML and LLL pulmonary nodules, subpleural AMELIA nodule and cavitary nodules.  ID has been consulted and TTE performed 7/30 without vegetation.     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  Has a remote history of a staph infection in his foot treated about 6-7 years ago with no further complications.  Denies shortness of breath, has an occasional dry cough but no sputum production. Subjective:     Patient had REGGIE earlier today. No results yet posted. C/o being hungry and having some LUQ/lower rib pain. On RA. Denies dyspnea. Afebrile last 24 hours.      Current Facility-Administered Medications   Medication Dose Route Frequency    fentaNYL citrate (PF) injection  mcg   mcg IntraVENous Multiple    midazolam (VERSED) injection 0.5-5 mg  0.5-5 mg IntraVENous Multiple    [START ON 8/2/2019] insulin glargine (LANTUS) injection 60 Units  60 Units SubCUTAneous DAILY    insulin lispro (HUMALOG) injection 8 Units  8 Units SubCUTAneous TIDAC    insulin glargine (LANTUS) injection 60 Units  60 Units SubCUTAneous ONCE    metoprolol succinate (TOPROL-XL) tablet 100 mg  100 mg Oral DAILY    lisinopril (PRINIVIL, ZESTRIL) tablet 5 mg  5 mg Oral DAILY    pantoprazole (PROTONIX) tablet 40 mg  40 mg Oral ACB    potassium, sodium phosphates (NEUTRA-PHOS) packet 1 Packet  1 Packet Oral QID    NUTRITIONAL SUPPORT ELECTROLYTE PRN ORDERS   Does Not Apply PRN    ceFAZolin (ANCEF) 2 g/20 mL in sterile water IV syringe  2 g IntraVENous Q8H    insulin lispro (HUMALOG) injection 0-10 Units  0-10 Units SubCUTAneous AC&HS    albuterol (PROVENTIL VENTOLIN) nebulizer solution 2.5 mg  2.5 mg Nebulization Q4H PRN    brompheniramine-pseudoeph-DM (DIMETAPP) 2-30-10 mg/5 mL syrup 5 mL (Patient Supplied)  5 mL Oral QID PRN    fluticasone propionate (FLONASE) 50 mcg/actuation nasal spray 2 Spray  2 Spray Both Nostrils DAILY    pravastatin (PRAVACHOL) tablet 10 mg  10 mg Oral QHS    enoxaparin (LOVENOX) injection 40 mg  40 mg SubCUTAneous Q24H    sodium chloride (NS) flush 5-10 mL  5-10 mL IntraVENous PRN    dextrose 40% (GLUTOSE) oral gel 1 Tube  15 g Oral PRN    glucagon (GLUCAGEN) injection 1 mg  1 mg IntraMUSCular PRN    dextrose (D50W) injection syrg 12.5-25 g  25-50 mL IntraVENous PRN    sodium chloride (NS) flush 5-40 mL  5-40 mL IntraVENous Q8H    sodium chloride (NS) flush 5-40 mL  5-40 mL IntraVENous PRN    acetaminophen (TYLENOL) tablet 650 mg  650 mg Oral Q4H PRN    naloxone (NARCAN) injection 0.4 mg  0.4 mg IntraVENous PRN    diphenhydrAMINE (BENADRYL) injection 12.5 mg  12.5 mg IntraVENous Q4H PRN    ondansetron (ZOFRAN) injection 4 mg  4 mg IntraVENous Q4H PRN       Review of Systems  Constitutional: negative for fever, chills, sweats  Cardiovascular: negative for chest pain, palpitations, syncope, edema  Gastrointestinal: negative for dysphagia, reflux, vomiting, diarrhea, abdominal pain, or melena  Neurologic: negative for focal weakness, numbness, headache    Objective: Vitals:    08/01/19 0950 08/01/19 1000 08/01/19 1010 08/01/19 1142   BP: 103/64 109/69 111/71 124/66   Pulse: (!) 116 (!) 114 (!) 113 (!) 102   Resp: 23 25 24 22   Temp:    98.7 °F (37.1 °C)   SpO2: 97% 96% 95% 98%   Weight:       Height:         Intake and Output:   07/30 1901 - 08/01 0700  In: 2030 [P.O.:2030]  Out: 1225 [Urine:1225]  08/01 0701 - 08/01 1900  In: -   Out: 200 [Urine:200]    Physical Exam:   Constitution:  the patient is well developed and in no acute distress  EENMT:  Sclera clear, pupils equal, oral mucosa moist  Respiratory: CTA B, now /r/r  Cardiovascular:  RRR without M,G,R  Gastrointestinal: soft and non-tender; with positive bowel sounds. Musculoskeletal: warm without cyanosis. There is no lower leg edema. Skin:  no jaundice or rashes, no wounds   Neurologic: no gross neuro deficits     Psychiatric:  alert and oriented x ppt    CHEST CT:   7/29/19      LAB  No lab exists for component: [place]   Recent Labs     08/01/19  0605 07/31/19  0530 07/30/19  0321   WBC 9.6 9.1 9.1   HGB 10.7* 10.4* 10.2*   HCT 32.9* 31.6* 31.3*    275 284     Recent Labs     08/01/19  0605 07/31/19  0530 07/30/19  1541  07/30/19  0321 07/30/19  0047   * 133* 132*  --  136 135*   K 3.8 4.4 4.3   < > 3.3* 3.9   CL 97* 100 102  --  107 107   CO2 21 18* 16*  --  18* 18*   * 265* 290*  --  174* 170*   BUN 16 16 9  --  7 9   CREA 0.66* 0.74* 0.70*  --  0.65* 0.73*   MG  --  2.0  --   --  1.8 1.9   CA 8.9 9.0 8.5  --  8.7 8.7   PHOS  --  2.0*  --   --   --   --     < > = values in this interval not displayed.      ABG:  No results found for: PH, PHI, PCO2, PCO2I, PO2, PO2I, HCO3, HCO3I, FIO2, FIO2I    Assessment:  (Medical Decision Making)     Hospital Problems  Date Reviewed: 7/30/2019          Codes Class Noted POA    Acute septic pulmonary embolism without acute cor pulmonale ([facility] ICD-10-CM: I26.90  ICD-9-CM: 415.12  8/1/2019 Unknown        Bacteremia due to methicillin susceptible Staphylococcus aureus (MSSA) ICD-10-CM: R78.81  ICD-9-CM: 790.7, 041.11  8/1/2019 Unknown        Pulmonary cavitary lesion ICD-10-CM: J98.4  ICD-9-CM: 518.89  7/30/2019 Yes        Acute pyelonephritis ICD-10-CM: N10  ICD-9-CM: 590.10  7/30/2019 Yes        * (Principal) Sepsis due to methicillin susceptible Staphylococcus aureus ([facility] ICD-10-CM: A41.01  ICD-9-CM: 038.11, 995.91  7/29/2019 Yes        DKA (diabetic ketoacidoses) ([facility] ICD-10-CM: E13.10  ICD-9-CM: 250.10  7/28/2019 Yes        DM (diabetes mellitus) ([facility] (Chronic) ICD-10-CM: E11.9  ICD-9-CM: 250.00  11/18/2013 Yes        HTN (hypertension) (Chronic) ICD-10-CM: I10  ICD-9-CM: 401.9  11/18/2013 Yes            Pt with MSSA bacteremia with persistently positive blood cultures and urine culture. Signs of pulmonary septic emboli. TTE negative. REGGIE just performed with results pending. Plan:  (Medical Decision Making)   -on room air.   -cont abx as per ID.   -f/u REGGIE results. -f/u CT after completion of abx course, likely in 6-8 weeks.        Lory Liang MD

## 2019-08-01 NOTE — PROGRESS NOTES
TRANSFER - OUT REPORT:    Verbal report given to cath lab staff who came to get pt pt on Jose Gabriel  being transferred to Cath lab for REGGIE for ordered procedure       Report consisted of patients Situation, Background, Assessment and   Recommendations(SBAR). Information from the following report(s) SBAR was reviewed with the receiving nurse. Opportunity for questions and clarification was provided. Patient transported with:   Cath lab staff x2 who took pt in bed. 1230-SBAR IN-from REGGIE. CC KATHERINE Marquez took report for primary RN. Thank you KATHERINE Marquez.

## 2019-08-01 NOTE — PROGRESS NOTES
MD Kisha Singh paged r/t [phone]. Order for 10 units per SSI, and to give 5 additional for a total of 15. Repeated and verified. CC RN, B Wall aware. Pt updated. MD to change insulin orders.

## 2019-08-01 NOTE — PROGRESS NOTES
Patient remains NPO overnight. All hourly rounds performed. Call light within reach. No complaints at this time. Will continue with plan of care and give report to oncoming nurse.

## 2019-08-01 NOTE — PROGRESS NOTES
END OF SHIFT NOTE:    INTAKE/OUTPUT  07/31 0701 - 08/01 0700  In: 2030 [P.O.:2030]  Out: 1225 [Urine:1225]  Voiding: YES  Catheter: NO  Color: clear        DIET  DM    Flatus: Patient does have flatus present. Stool:  0 occurrences. Characteristics:       Ambulating  Yes    Emesis: 0 occurrences.     Characteristics:          VITAL SIGNS  Patient Vitals for the past 12 hrs:   Temp Pulse Resp BP SpO2   08/01/19 1603 98.6 °F (37 °C) 100 24 118/72 97 %   08/01/19 1142 98.7 °F (37.1 °C) (!) 102 22 124/66 98 %   08/01/19 1010  (!) 113 24 111/71 95 %   08/01/19 1000  (!) 114 25 109/69 96 %   08/01/19 0950  (!) 116 23 103/64 97 %   08/01/19 0947  (!) 116 22 106/64 97 %   08/01/19 0945  (!) 114 23 109/68 97 %   08/01/19 0942  (!) 112 20 114/80 97 %   08/01/19 0940  (!) 107 22 123/78 97 %   08/01/19 0937  (!) 111 21 139/88 98 %   08/01/19 0934  (!) 115 25 106/70 98 %   08/01/19 0932  (!) 117 25 105/71 98 %   08/01/19 0930  (!) 116 25 109/70 98 %   08/01/19 0928  (!) 114 25 115/75 98 %   08/01/19 0925  (!) 110 23 104/75 98 %   08/01/19 0923  (!) 112 24 122/78 98 %   08/01/19 0920  (!) 107 24 119/77 98 %   08/01/19 0918  (!) 108 22 123/81 98 %   08/01/19 0914  (!) 109 22 123/80 95 %   08/01/19 0857  (!) 108 20 133/85 98 %   08/01/19 0747 98.2 °F (36.8 °C) (!) 107 18 102/64 96 %       Pain Assessment  Pain Intensity 1: 8 (08/01/19 1543)  Pain Location 1: Back  Pain Intervention(s) 1: Medication (see MAR)  Patient Stated Pain Goal: 117 [address] Bere Schmitz RN

## 2019-08-01 NOTE — PROGRESS NOTES
Date of Outreach Update:  Elbert Donis was seen and assessed. MEWS Score: 2 (07/31/19 2323)  Vitals:    08/01/19 0947 08/01/19 0950 08/01/19 1000 08/01/19 1010   BP: 106/64 103/64 109/69 111/71   Pulse: (!) 116 (!) 116 (!) 114 (!) 113   Resp: 22 23 25 24   Temp:       SpO2: 97% 97% 96% 95%   Weight:       Height:             Pain Assessment  Pain Intensity 1: 5 (08/01/19 0711)  Pain Location 1: Back  Pain Intervention(s) 1: Family Support, Environmental changes, Distraction, Emotional support, Position, Repositioned, Rest, Therapeutic presence, Therapeutic touch  Patient Stated Pain Goal: 5      Previous Outreach assessment has been reviewed. There have been no significant clinical changes since the completion of the last dated Outreach assessment. Patient remains tachycardic (AM metoprolol xl dose was held per MD). Patient discussed with primary RN. Primary RN to get lantus insulin dose rescheduled to today so patient does not have lapse in basal coverage. Will continue to follow up per outreach protocol.     Signed By:   Vanesa Christensen RN    August 1, 2019 11:38 AM

## 2019-08-01 NOTE — PROGRESS NOTES
Date of Outreach Update:  Caroline Hart was seen and assessed. Previous Outreach assessment has been reviewed. There have been no significant clinical changes since the completion of the last dated Outreach assessment. Will continue to follow up per outreach protocol.     Signed By:   Eliel Calabrese RN    August 1, 2019 2:51 AM

## 2019-08-01 NOTE — PROGRESS NOTES
Infectious Disease Note    Today's Date: 2019   Admit Date: 2019    Impression:   · MSSA bacteremia (, ) with pulmonary emboli and L pyelonephritis. No source identified. · Uncontrolled DMII    Plan:   ·  Continue Ancef. · Check repeat BCs again today   · REGGIE pending, to be done today     Anti-infectives:   · Ancef (-      Subjective:   No acute events, no fevers or chills. BCs from  + both sets. Allergies   Allergen Reactions    Influenza Virus Vaccine, Specific Nausea and Vomiting        Review of Systems:  A comprehensive review of systems was negative except for that written in the History of Present Illness. Objective:     Visit Vitals  /69   Pulse (!) 114   Temp 98.2 °F (36.8 °C)   Resp 25   Ht 5' 11\" (1.803 m)   Wt 75.3 kg (166 lb 1.6 oz)   SpO2 96%   BMI 23.17 kg/m²     Temp (24hrs), Av.2 °F (36.8 °C), Min:97.6 °F (36.4 °C), Max:99.3 °F (37.4 °C)       Lines:  Peripheral IV:       Physical Exam:    General:  Alert, cooperative, appears stated age   Eyes:  Sclera anicteric. Pupils equally round and reactive to light. Mouth/Throat: Mucous membranes normal, oral pharynx clear   Neck: Supple   Lungs:   Clear to auscultation bilaterally, good effort   CV:  Regular rate and rhythm,no murmur, click, rub or gallop   Abdomen:   Soft, non-tender.  bowel sounds normal. non-distended. + L CVA tenderness    Extremities: No cyanosis or edema   Skin: Skin color, texture, turgor normal. no acute rash or lesions   Lymph nodes: Cervical and supraclavicular normal   Musculoskeletal: No swelling or deformity   Lines/Devices:  Intact, no erythema, drainage or tenderness   Psych: Alert and oriented, normal mood affect given the setting       Data Review:     CBC:  Recent Labs     19  0605 19  0530 19  0321   WBC 9.6 9.1 9.1   GRANS  --   --  80*   MONOS  --   --  9   EOS  --   --  1   ANEU  --   --  7.3   ABL  --   --  0.8   HGB 10.7* 10.4* 10.2*   HCT 32.9* 31.6* 31.3*    275 284       BMP:  Recent Labs     08/01/19  0605 07/31/19  0530 07/30/19  1541   CREA 0.66* 0.74* 0.70*   BUN 16 16 9   * 133* 132*   K 3.8 4.4 4.3   CL 97* 100 102   CO2 21 18* 16*   AGAP 12 15 14   * 265* 290*       LFTS:  No results for input(s): TBILI, ALT, SGOT, AP, TP, ALB in the last 72 hours. Microbiology:     All Micro Results     Procedure Component Value Units Date/Time    CULTURE, BLOOD [[id number]]     Order Status:  Sent Specimen:  Blood     CULTURE, BLOOD [[id number]]     Order Status:  Sent Specimen:  Blood     CULTURE, BLOOD [[id number]]  (Abnormal) Collected:  07/30/19 1311    Order Status:  Completed Specimen:  Blood Updated:  08/01/19 0751     Special Requests: --        LEFT  FOREARM       GRAM STAIN       GRAM POS COCCI IN CLUSTERS            AEROBIC BOTTLE POSITIVE               CRITICAL RESULT NOT CALLED DUE TO PREVIOUS NOTIFICATION OF CRITICAL RESULT WITHIN THE LAST 24 HOURS.            Culture result: STAPHYLOCOCCUS AUREUS         FOR SUSCEPTIBILITY REFER TO ACCESSION [id number]    CULTURE, BLOOD [[id number]]  (Abnormal) Collected:  07/30/19 1316    Order Status:  Completed Specimen:  Blood Updated:  08/01/19 0750     Special Requests: --        LEFT  ARM       GRAM STAIN       GRAM POS COCCI IN CLUSTERS            AEROBIC BOTTLE POSITIVE               RESULTS VERIFIED, PHONED TO AND READ BACK BY Kate Walker RN AT 2491 ON [id number] MT           Culture result: STAPHYLOCOCCUS AUREUS         FOR SUSCEPTIBILITY REFER TO ACCESSION [id number]    CULTURE, URINE [[id number]]  (Abnormal)  (Susceptibility) Collected:  07/28/19 1901    Order Status:  Completed Specimen:  Urine from Clean catch Updated:  07/31/19 0730     Special Requests: NO SPECIAL REQUESTS        Culture result:       >100,000 COLONIES/mL STAPHYLOCOCCUS AUREUS          CULTURE, BLOOD [[id number]]  (Abnormal)  (Susceptibility) Collected:  07/28/19 1945    Order Status:  Completed Specimen:  Blood Updated: 07/31/19 0446     Special Requests: --        RIGHT  JUGULAR VEIN       GRAM STAIN       GRAM POS COCCI IN CLUSTERS                  AEROBIC AND ANAEROBIC BOTTLES                  RESULTS VERIFIED, PHONED TO AND READ BACK BY ALFONSO [address] RN AT 6515 ON [id number] MT           Culture result: STAPHYLOCOCCUS AUREUS               STAPHYLOCOCCUS AUREUS DETECTED Test Performed by Multiplex PCR                  RESULTS VERIFIED, PHONED TO AND READ BACK BY  ALFONSO [address] RN AT 6064 ON [id number] MT      CULTURE, BLOOD [[id number]]  (Abnormal)  (Susceptibility) Collected:  07/28/19 2339    Order Status:  Completed Specimen:  Blood Updated:  07/31/19 0642     Special Requests: --        LEFT  HAND       GRAM STAIN       GRAM POS COCCI IN CLUSTERS            AEROBIC BOTTLE POSITIVE               CRITICAL RESULT NOT CALLED DUE TO PREVIOUS NOTIFICATION OF CRITICAL RESULT WITHIN THE LAST 24 HOURS.            Culture result: STAPHYLOCOCCUS AUREUS             Imaging:   See [facility]/EPIC     Signed By: Deloise Felty, NP     August 1, 2019

## 2019-08-01 NOTE — DIABETES MGMT
Attempted to see pt for continued diabetes education, but pt currently off the floor for REGGIE. . Blood glucose range yesterday 393-007 with pt receiving a total 84 units of insulin (Lantus 50 units and Humalog 34 units). Providers plan is to increase Lantus dose and add prandial insulin to regimen.

## 2019-08-01 NOTE — PROGRESS NOTES
[facility] CRITICAL CARE OUTREACH NURSE PROGRESS REPORT      SUBJECTIVE: Called to assess patient secondary to transfer from CCU. MEWS Score: 2 (07/31/19 2323)  Vitals:    07/31/19 2323 08/01/19 0424 08/01/19 0532 08/01/19 0747   BP: 111/74 107/72  102/64   Pulse: (!) 105 (!) 108  (!) 107   Resp: 18 16  18   Temp: 97.7 °F (36.5 °C) 98 °F (36.7 °C)  98.2 °F (36.8 °C)   SpO2: 97% 95%  96%   Weight:   75.3 kg (166 lb 1.6 oz)    Height:            LAB DATA:    Recent Labs     08/01/19  0605 07/31/19 0530 07/30/19  1541  07/30/19  0321 07/30/19  0047   * 133* 132*  --  136 135*   K 3.8 4.4 4.3   < > 3.3* 3.9   CL 97* 100 102  --  107 107   CO2 21 18* 16*  --  18* 18*   AGAP 12 15 14  --  11 10   * 265* 290*  --  174* 170*   BUN 16 16 9  --  7 9   CREA 0.66* 0.74* 0.70*  --  0.65* 0.73*   GFRAA >60 >60 >60  --  >60 >60   GFRNA >60 >60 >60  --  >60 >60   CA 8.9 9.0 8.5  --  8.7 8.7   MG  --  2.0  --   --  1.8 1.9   PHOS  --  2.0*  --   --   --   --     < > = values in this interval not displayed. Recent Labs     08/01/19  0605 07/31/19 0530 07/30/19  0321   WBC 9.6 9.1 9.1   HGB 10.7* 10.4* 10.2*   HCT 32.9* 31.6* 31.3*    275 284          OBJECTIVE: On arrival to room, I found patient to be out of room for REGGIE. Pain Assessment  Pain Intensity 1: 9 (07/31/19 1359)  Pain Location 1: Back, Shoulder  Pain Intervention(s) 1: Medication (see MAR)  Patient Stated Pain Goal: 0     ASSESSMENT:  Chart reviewed, including labs, VS, and progress notes. Patient with ongoing hyperglycemia. Received reduced dose of SSI this AM. Afebrile. Tachycardic in low 100s. PLAN:  Will physically assess after return to floor.

## 2019-08-01 NOTE — PROGRESS NOTES
Hospitalist Note     Admit Date:  2019  7:18 PM   Name:  Harmony Shrestha   Age:  44 y.o.  :  1970   MRN:  [id number]   PCP:  Damari Taylor MD  Treatment Team: Attending Provider: Mikey Nassar MD; Care Manager: Adelso Carolina, RN; Utilization Review: Jaciel Pugh RN; Consulting Provider: Sharrie Boxer, MD; Consulting Provider: Kay Hill MD; Consulting Provider: Jazmine Pascual MD; Consulting Provider: Juan Ramon Parra MD; Primary Nurse: Jennifer Alfonso, RN; Primary Nurse: Yolie Jackson, RN; Charge Nurse[facility] Nurse: Carl Hobbs RN    Mr. May Martinez is a 45 y/o M with DM who presented to ER with weakness, chills, abd pain. Met sepsis criteria for possible UTI. Found to be in DKA. Admitted to ICU on rocephin, insulin gtt and IVFs. Blood and urine cultures came back with MSSA bacteremia. Switched to ancef. CT abd ordered for pain and found cavitary lesions in lung, also seen on CT chest.  pulm and ID on board. Echo showed no vegetations, but new onset systolic HF EF 51-84%. REGGIE by cardiology on 19 showed no endocarditis. The patient would like home iv antibiotic therapy. CM on board to arrange teaching. Subjective:   He denies acute complains. REGGIE done today was normal.  FS has been elevated and appreciate DM educator input.      Objective:     Patient Vitals for the past 24 hrs:   Temp Pulse Resp BP SpO2   19 0424 98 °F (36.7 °C) (!) 108 16 107/72 95 %   19 2323 97.7 °F (36.5 °C) (!) 105 18 111/74 97 %   19 1948 98.2 °F (36.8 °C) (!) 103 18 102/61 98 %   19 1704 99.3 °F (37.4 °C) 90 17 115/65 98 %   19 1333 97.6 °F (36.4 °C) (!) 109 18 144/85 98 %   19 0817 99.3 °F (37.4 °C) (!) 101 16 148/78 100 %     Oxygen Therapy  O2 Sat (%): 95 % (19 0424)  Pulse via Oximetry: 114 beats per minute (19 1500)  O2 Device: Room air (19 1100)      Intake/Output Summary (Last 24 hours) at 2019 0745  Last data filed at 8/1/2019 0520  Gross per 24 hour   Intake 2030 ml   Output 1225 ml   Net 805 ml       *Note that automatically entered I/Os may not be accurate; dependent on patient compliance with collection and accurate  by techs. General:    Well nourished. Alert. CV:  reg. No murmur, rub, or gallop. Lungs:   CTAB. No wheezing, rhonchi, or rales. Abdomen:   Soft, non-distended, no rebound, normal BS   Extremities: Warm and dry. No cyanosis or edema. Skin:     No rashes or jaundice.    Neuro:  No gross focal deficits    Data Review:  I have reviewed all labs, meds, and studies from the last 24 hours:    Recent Results (from the past 24 hour(s))   GLUCOSE, POC    Collection Time: 07/31/19 11:32 AM   Result Value Ref Range    Glucose (POC) 351 (H) 65 - 100 mg/dL   GLUCOSE, POC    Collection Time: 07/31/19  5:01 PM   Result Value Ref Range    Glucose (POC) 329 (H) 65 - 100 mg/dL   GLUCOSE, POC    Collection Time: 07/31/19  9:30 PM   Result Value Ref Range    Glucose (POC) 389 (H) 65 - 397 mg/dL   METABOLIC PANEL, BASIC    Collection Time: 08/01/19  6:05 AM   Result Value Ref Range    Sodium 130 (L) 136 - 145 mmol/L    Potassium 3.8 3.5 - 5.1 mmol/L    Chloride 97 (L) 98 - 107 mmol/L    CO2 21 21 - 32 mmol/L    Anion gap 12 7 - 16 mmol/L    Glucose 286 (H) 65 - 100 mg/dL    BUN 16 6 - 23 MG/DL    Creatinine 0.66 (L) 0.8 - 1.5 MG/DL    GFR est AA >60 >60 ml/min/1.73m2    GFR est non-AA >60 >60 ml/min/1.73m2    Calcium 8.9 8.3 - 10.4 MG/DL   CBC W/O DIFF    Collection Time: 08/01/19  6:05 AM   Result Value Ref Range    WBC 9.6 4.3 - 11.1 K/uL    RBC 4.11 (L) 4.23 - 5.6 M/uL    HGB 10.7 (L) 13.6 - 17.2 g/dL    HCT 32.9 (L) 41.1 - 50.3 %    MCV 80.0 79.6 - 97.8 FL    MCH 26.0 (L) 26.1 - 32.9 PG    MCHC 32.5 31.4 - 35.0 g/dL    RDW 14.9 (H) 11.9 - 14.6 %    PLATELET 902 283 - 218 K/uL    MPV 9.8 9.4 - 12.3 FL    ABSOLUTE NRBC 0.00 0.0 - 0.2 K/uL        All Micro Results     Procedure Component Value Units Date/Time    CULTURE, BLOOD [[id number]] Collected:  07/30/19 1311    Order Status:  Completed Specimen:  Blood Updated:  07/31/19 1134     Special Requests: --        LEFT  FOREARM       GRAM STAIN       GRAM POS COCCI IN CLUSTERS            AEROBIC BOTTLE POSITIVE               CRITICAL RESULT NOT CALLED DUE TO PREVIOUS NOTIFICATION OF CRITICAL RESULT WITHIN THE LAST 24 HOURS.            Culture result:       CULTURE IN PROGRESS,FURTHER UPDATES TO FOLLOW          CULTURE, BLOOD [[id number]] Collected:  07/30/19 1316    Order Status:  Completed Specimen:  Blood Updated:  07/31/19 1132     Special Requests: --        LEFT  ARM       GRAM STAIN       GRAM POS COCCI IN CLUSTERS            AEROBIC BOTTLE POSITIVE               RESULTS VERIFIED, PHONED TO AND READ BACK BY Agustina Olivares RN AT 4460 ON 200927 MT           Culture result:       CULTURE IN [address] UPDATES TO FOLLOW          CULTURE, URINE [[id number]]  (Abnormal)  (Susceptibility) Collected:  07/28/19 1901    Order Status:  Completed Specimen:  Urine from Clean catch Updated:  07/31/19 0730     Special Requests: NO SPECIAL REQUESTS        Culture result:       >100,000 COLONIES/mL STAPHYLOCOCCUS AUREUS          CULTURE, BLOOD [[id number]]  (Abnormal)  (Susceptibility) Collected:  07/28/19 1945    Order Status:  Completed Specimen:  Blood Updated:  07/31/19 0642     Special Requests: --        RIGHT  JUGULAR VEIN       GRAM STAIN       GRAM POS COCCI IN CLUSTERS                  AEROBIC AND ANAEROBIC BOTTLES                  RESULTS VERIFIED, PHONED TO AND READ BACK BY ALFONSO [address] RN  [facility] ON 822659 MT           Culture result: STAPHYLOCOCCUS AUREUS               STAPHYLOCOCCUS AUREUS DETECTED Test Performed by Multiplex PCR                  RESULTS VERIFIED, PHONED TO AND READ BACK BY  ALFONSO [address] RN AT 9450 EH [id number] MT      CULTURE, BLOOD [[id number]]  (Abnormal)  (Susceptibility) Collected:  07/28/19 2339    Order Status: Completed Specimen:  Blood Updated:  07/31/19 0642     Special Requests: --        LEFT  HAND       GRAM STAIN       GRAM POS COCCI IN CLUSTERS            AEROBIC BOTTLE POSITIVE               CRITICAL RESULT NOT CALLED DUE TO PREVIOUS NOTIFICATION OF CRITICAL RESULT WITHIN THE LAST 24 HOURS. Culture result: STAPHYLOCOCCUS AUREUS             Results for orders placed or performed during the hospital encounter of 07/28/19   2D ECHO COMPLETE ADULT (TTE) W OR WO CONTR    Narrative    Report creation error on 7/30/2019 12:03:58 PM    An error occurred while generating this report. The most common cause of such  errors are incomplete, duplicate, or corrupted findings. To fix this error:  return to the Outline Viewer, click the Tools button, and select \"Check  findings\". If the problem persists, contact your IS .        Current Meds:  Current Facility-Administered Medications   Medication Dose Route Frequency    metoprolol succinate (TOPROL-XL) tablet 100 mg  100 mg Oral DAILY    lisinopril (PRINIVIL, ZESTRIL) tablet 5 mg  5 mg Oral DAILY    pantoprazole (PROTONIX) tablet 40 mg  40 mg Oral ACB    potassium, sodium phosphates (NEUTRA-PHOS) packet 1 Packet  1 Packet Oral QID    NUTRITIONAL SUPPORT ELECTROLYTE PRN ORDERS   Does Not Apply PRN    ceFAZolin (ANCEF) 2 g/20 mL in sterile water IV syringe  2 g IntraVENous Q8H    insulin glargine (LANTUS) injection 50 Units  50 Units SubCUTAneous DAILY    insulin lispro (HUMALOG) injection 0-10 Units  0-10 Units SubCUTAneous AC&HS    albuterol (PROVENTIL VENTOLIN) nebulizer solution 2.5 mg  2.5 mg Nebulization Q4H PRN    brompheniramine-pseudoeph-DM (DIMETAPP) 2-30-10 mg/5 mL syrup 5 mL (Patient Supplied)  5 mL Oral QID PRN    fluticasone propionate (FLONASE) 50 mcg/actuation nasal spray 2 Spray  2 Spray Both Nostrils DAILY    pravastatin (PRAVACHOL) tablet 10 mg  10 mg Oral QHS    enoxaparin (LOVENOX) injection 40 mg  40 mg SubCUTAneous Q24H    sodium chloride (NS) flush 5-10 mL  5-10 mL IntraVENous PRN    dextrose 40% (GLUTOSE) oral gel 1 Tube  15 g Oral PRN    glucagon (GLUCAGEN) injection 1 mg  1 mg IntraMUSCular PRN    dextrose (D50W) injection syrg 12.5-25 g  25-50 mL IntraVENous PRN    sodium chloride (NS) flush 5-40 mL  5-40 mL IntraVENous Q8H    sodium chloride (NS) flush 5-40 mL  5-40 mL IntraVENous PRN    acetaminophen (TYLENOL) tablet 650 mg  650 mg Oral Q4H PRN    naloxone (NARCAN) injection 0.4 mg  0.4 mg IntraVENous PRN    diphenhydrAMINE (BENADRYL) injection 12.5 mg  12.5 mg IntraVENous Q4H PRN    ondansetron (ZOFRAN) injection 4 mg  4 mg IntraVENous Q4H PRN       Other Studies (last 24 hours):  No results found. Assessment and Plan:     Hospital Problems as of 8/1/2019 Date Reviewed: 7/30/2019          Codes Class Noted - Resolved POA    Pulmonary cavitary lesion ICD-10-CM: J98.4  ICD-9-CM: 518.89  7/30/2019 - Present Yes        Acute pyelonephritis ICD-10-CM: N10  ICD-9-CM: 590.10  7/30/2019 - Present Yes        * (Principal) Sepsis due to methicillin susceptible Staphylococcus aureus ([facility] ICD-10-CM: A41.01  ICD-9-CM: 038.11, 995.91  7/29/2019 - Present Yes        DKA (diabetic ketoacidoses) ([facility] ICD-10-CM: E13.10  ICD-9-CM: 250.10  7/28/2019 - Present Yes        DM (diabetes mellitus) ([facility] (Chronic) ICD-10-CM: E11.9  ICD-9-CM: 250.00  11/18/2013 - Present Yes        HTN (hypertension) (Chronic) ICD-10-CM: I10  ICD-9-CM: 401.9  11/18/2013 - Present Yes              Plan:    -DKA: resolved  Still with hyperglycemia  Increase lantus to 60 units  Add humalog 8 units premeals   Humalog SS  DM management following    -MSSA bacteremia / UTI:   -Cavitary pulmonary lesions, possible septic emboli:  REGGIE done today showed no endocarditis   Continue ancef  Pulmonary and ID on board  The patient would prefer home iv antibiotics.  CM to arrange teaching   Follow up repeated blood cultures    -New, systolic HF EF 79-61%  on BB, ACEI  Cardiology on board     -Hypophosphatemia:  neutra phosp     DC planning/Disposition: home once medically ready     Diet:  DIET NPO  DVT ppx:  lovemiguel ax    Signed:  Rick Cortez MD

## 2019-08-02 ENCOUNTER — HOME HEALTH ADMISSION (OUTPATIENT)
Dept: HOME HEALTH SERVICES | Facility: HOME HEALTH | Age: 49
End: 2019-08-02

## 2019-08-02 LAB
ANION GAP SERPL CALC-SCNC: 11 MMOL/L (ref 7–16)
BUN SERPL-MCNC: 11 MG/DL (ref 6–23)
CALCIUM SERPL-MCNC: 8.8 MG/DL (ref 8.3–10.4)
CHLORIDE SERPL-SCNC: 94 MMOL/L (ref 98–107)
CO2 SERPL-SCNC: 26 MMOL/L (ref 21–32)
CREAT SERPL-MCNC: 0.69 MG/DL (ref 0.8–1.5)
ERYTHROCYTE [DISTWIDTH] IN BLOOD BY AUTOMATED COUNT: 14.6 % (ref 11.9–14.6)
GLUCOSE BLD STRIP.AUTO-MCNC: 181 MG/DL (ref 65–100)
GLUCOSE BLD STRIP.AUTO-MCNC: 256 MG/DL (ref 65–100)
GLUCOSE BLD STRIP.AUTO-MCNC: 264 MG/DL (ref 65–100)
GLUCOSE BLD STRIP.AUTO-MCNC: 276 MG/DL (ref 65–100)
GLUCOSE SERPL-MCNC: 278 MG/DL (ref 65–100)
HCT VFR BLD AUTO: 32.4 % (ref 41.1–50.3)
HGB BLD-MCNC: 10.8 G/DL (ref 13.6–17.2)
MCH RBC QN AUTO: 26.3 PG (ref 26.1–32.9)
MCHC RBC AUTO-ENTMCNC: 33.3 G/DL (ref 31.4–35)
MCV RBC AUTO: 79 FL (ref 79.6–97.8)
NRBC # BLD: 0 K/UL (ref 0–0.2)
PLATELET # BLD AUTO: 312 K/UL (ref 150–450)
PMV BLD AUTO: 9.6 FL (ref 9.4–12.3)
POTASSIUM SERPL-SCNC: 3.5 MMOL/L (ref 3.5–5.1)
RBC # BLD AUTO: 4.1 M/UL (ref 4.23–5.6)
SODIUM SERPL-SCNC: 131 MMOL/L (ref 136–145)
WBC # BLD AUTO: 9.2 K/UL (ref 4.3–11.1)

## 2019-08-02 PROCEDURE — 74011250637 HC RX REV CODE- 250/637: Performed by: INTERNAL MEDICINE

## 2019-08-02 PROCEDURE — 99232 SBSQ HOSP IP/OBS MODERATE 35: CPT | Performed by: INTERNAL MEDICINE

## 2019-08-02 PROCEDURE — 65270000029 HC RM PRIVATE

## 2019-08-02 PROCEDURE — 74011250636 HC RX REV CODE- 250/636: Performed by: INTERNAL MEDICINE

## 2019-08-02 PROCEDURE — 74011636637 HC RX REV CODE- 636/637: Performed by: INTERNAL MEDICINE

## 2019-08-02 PROCEDURE — 85027 COMPLETE CBC AUTOMATED: CPT

## 2019-08-02 PROCEDURE — 74011250637 HC RX REV CODE- 250/637: Performed by: PHYSICIAN ASSISTANT

## 2019-08-02 PROCEDURE — 82962 GLUCOSE BLOOD TEST: CPT

## 2019-08-02 PROCEDURE — 36415 COLL VENOUS BLD VENIPUNCTURE: CPT

## 2019-08-02 PROCEDURE — 80048 BASIC METABOLIC PNL TOTAL CA: CPT

## 2019-08-02 RX ORDER — TRAMADOL HYDROCHLORIDE 50 MG/1
50 TABLET ORAL
Status: DISCONTINUED | OUTPATIENT
Start: 2019-08-02 | End: 2019-08-07 | Stop reason: HOSPADM

## 2019-08-02 RX ORDER — INSULIN LISPRO 100 [IU]/ML
10 INJECTION, SOLUTION INTRAVENOUS; SUBCUTANEOUS
Status: DISCONTINUED | OUTPATIENT
Start: 2019-08-02 | End: 2019-08-04

## 2019-08-02 RX ADMIN — INSULIN LISPRO 6 UNITS: 100 INJECTION, SOLUTION INTRAVENOUS; SUBCUTANEOUS at 12:14

## 2019-08-02 RX ADMIN — INSULIN GLARGINE 60 UNITS: 100 INJECTION, SOLUTION SUBCUTANEOUS at 08:27

## 2019-08-02 RX ADMIN — INSULIN LISPRO 6 UNITS: 100 INJECTION, SOLUTION INTRAVENOUS; SUBCUTANEOUS at 07:45

## 2019-08-02 RX ADMIN — INSULIN LISPRO 2 UNITS: 100 INJECTION, SOLUTION INTRAVENOUS; SUBCUTANEOUS at 18:36

## 2019-08-02 RX ADMIN — Medication 2 G: at 01:26

## 2019-08-02 RX ADMIN — TRAMADOL HYDROCHLORIDE 50 MG: 50 TABLET, FILM COATED ORAL at 19:26

## 2019-08-02 RX ADMIN — ACETAMINOPHEN 650 MG: 325 TABLET, FILM COATED ORAL at 06:01

## 2019-08-02 RX ADMIN — LISINOPRIL 5 MG: 5 TABLET ORAL at 08:25

## 2019-08-02 RX ADMIN — Medication 10 ML: at 22:33

## 2019-08-02 RX ADMIN — POTASSIUM & SODIUM PHOSPHATES POWDER PACK 280-160-250 MG 1 PACKET: 280-160-250 PACK at 08:25

## 2019-08-02 RX ADMIN — METOPROLOL SUCCINATE 100 MG: 100 TABLET, EXTENDED RELEASE ORAL at 08:25

## 2019-08-02 RX ADMIN — Medication 10 ML: at 08:35

## 2019-08-02 RX ADMIN — INSULIN LISPRO 8 UNITS: 100 INJECTION, SOLUTION INTRAVENOUS; SUBCUTANEOUS at 07:45

## 2019-08-02 RX ADMIN — ONDANSETRON 4 MG: 2 INJECTION INTRAMUSCULAR; INTRAVENOUS at 22:43

## 2019-08-02 RX ADMIN — TRAMADOL HYDROCHLORIDE 50 MG: 50 TABLET, FILM COATED ORAL at 12:17

## 2019-08-02 RX ADMIN — Medication 2 G: at 17:37

## 2019-08-02 RX ADMIN — ACETAMINOPHEN 650 MG: 325 TABLET, FILM COATED ORAL at 00:29

## 2019-08-02 RX ADMIN — ONDANSETRON 4 MG: 2 INJECTION INTRAMUSCULAR; INTRAVENOUS at 17:26

## 2019-08-02 RX ADMIN — ONDANSETRON 4 MG: 2 INJECTION INTRAMUSCULAR; INTRAVENOUS at 01:30

## 2019-08-02 RX ADMIN — INSULIN LISPRO 6 UNITS: 100 INJECTION, SOLUTION INTRAVENOUS; SUBCUTANEOUS at 22:40

## 2019-08-02 RX ADMIN — POTASSIUM & SODIUM PHOSPHATES POWDER PACK 280-160-250 MG 1 PACKET: 280-160-250 PACK at 18:00

## 2019-08-02 RX ADMIN — ENOXAPARIN SODIUM 40 MG: 40 INJECTION SUBCUTANEOUS at 10:30

## 2019-08-02 RX ADMIN — Medication 2 G: at 08:35

## 2019-08-02 RX ADMIN — POTASSIUM & SODIUM PHOSPHATES POWDER PACK 280-160-250 MG 1 PACKET: 280-160-250 PACK at 13:30

## 2019-08-02 RX ADMIN — PRAVASTATIN SODIUM 10 MG: 20 TABLET ORAL at 22:33

## 2019-08-02 RX ADMIN — PANTOPRAZOLE SODIUM 40 MG: 40 TABLET, DELAYED RELEASE ORAL at 06:01

## 2019-08-02 RX ADMIN — INSULIN LISPRO 10 UNITS: 100 INJECTION, SOLUTION INTRAVENOUS; SUBCUTANEOUS at 12:15

## 2019-08-02 RX ADMIN — INSULIN LISPRO 10 UNITS: 100 INJECTION, SOLUTION INTRAVENOUS; SUBCUTANEOUS at 18:38

## 2019-08-02 RX ADMIN — Medication 10 ML: at 13:30

## 2019-08-02 NOTE — PROGRESS NOTES
Per intramed liaison, patient is covered at 100%. intramed liaison is coming today to provide teaching. Referral sent to [facility] for IV ABT therapy. Referral sent. liaisons notified. CM will continue to follow.

## 2019-08-02 NOTE — PROGRESS NOTES
[facility] - INPATIENT  Face to Face Encounter    Patients Name: Elizabeth Rosa    YOB: 1970    Ordering Physician: Dr. Everton Francisco    Primary Diagnosis: DKA (diabetic ketoacidoses) [facility]) [E13.10]    Date of Face to Face:   8/2/2019                                  Face to Face Encounter findings are related to primary reason for home care:   yes. 1. I certify that the patient needs intermittent care as follows: skilled nursing care:  skilled observation/assessment, patient education and PICC care and antibiotic administration    2. I certify that this patient is homebound, that is: 1) patient requires the use of a  device, special transportation, or assistance of another to leave the home; or 2) patient's condition makes leaving the home medically contraindicated; and 3) patient has a normal inability to leave the home and leaving the home requires considerable and taxing effort. Patient may leave the home for infrequent and short duration for medical reasons, and occasional absences for non-medical reasons. Homebound status is due to the following functional limitations: iv abt therapy    3. I certify that this patient is under my care and that I, or a nurse practitioner or  [id number], or clinical nurse specialist, or certified nurse midwife, working with me, had a Face-to-Face Encounter that meets the physician Face-to-Face Encounter requirements. The following are the clinical findings from the [address] [place] [address] encounter that support the need for skilled services and is a summary of the encounter: see hospital chart    See hospital chart      Shannon Paige RN  8/2/2019      THE FOLLOWING TO BE COMPLETED BY THE COMMUNITY PHYSICIAN:    I concur with the findings described above from the [facility] encounter that this patient is homebound and in need of a skilled service.     Certifying Physician: _____________________________________      Printed Certifying Physician Name: _____________________________________    Date: _________________

## 2019-08-02 NOTE — PROGRESS NOTES
Andrae Dwyer  Admission Date: 7/28/2019             Daily Progress Note: 8/2/2019    The patient's chart is reviewed and the patient is discussed with the staff. Patient is [phone] y.o.  male seen and evaluated at the request of Dr. Manjit Quiros for cavitary lesion in the lungs and pulmonary nodules.  He arrived via EMS with nausea and vomiting for 2 days. Karan Ricardo had recently been treated for UTI completing a week long course of antibiotics but has persisting dysuria.  Abdomen was distended.  Glucose was 539, anion gap 28, LA 3.35, WBC 20.5, Na 128, creat 1.49.  Was tachycardic and admitted to the ICU for DKA and sepsis due to UTI.  Placed on Insulin drip and antibiotics added after cultures obtained.  Glucose ranges improved.  CT of abdomen/pelvis obtained showing pulmonary nodules and suspicious for septic emboli.  Chest CT performed with RML and LLL pulmonary nodules, subpleural AMELIA nodule and cavitary nodules.  ID has been consulted and TTE performed 7/30 without vegetation.     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  Has a remote history of a staph infection in his foot treated about 6-7 years ago with no further complications.  Denies shortness of breath, has an occasional dry cough but no sputum production. Pt with MSSA bacteremia with persistently positive blood cultures and urine culture. Signs of pulmonary septic emboli. TTE negative. REGGIE with no evidence of endocarditis. Subjective:     Remain Afebrile, on room air. Has nonproductive cough. Still with some abdominal discomfort.      Current Facility-Administered Medications   Medication Dose Route Frequency    insulin lispro (HUMALOG) injection 10 Units  10 Units SubCUTAneous TIDAC    fentaNYL citrate (PF) injection  mcg   mcg IntraVENous Multiple    midazolam (VERSED) injection 0.5-5 mg  0.5-5 mg IntraVENous Multiple    insulin glargine (LANTUS) injection 60 Units  60 Units SubCUTAneous DAILY    metoprolol succinate (TOPROL-XL) tablet 100 mg  100 mg Oral DAILY    lisinopril (PRINIVIL, ZESTRIL) tablet 5 mg  5 mg Oral DAILY    pantoprazole (PROTONIX) tablet 40 mg  40 mg Oral ACB    potassium, sodium phosphates (NEUTRA-PHOS) packet 1 Packet  1 Packet Oral QID    NUTRITIONAL SUPPORT ELECTROLYTE PRN ORDERS   Does Not Apply PRN    ceFAZolin (ANCEF) 2 g/20 mL in sterile water IV syringe  2 g IntraVENous Q8H    insulin lispro (HUMALOG) injection 0-10 Units  0-10 Units SubCUTAneous AC&HS    albuterol (PROVENTIL VENTOLIN) nebulizer solution 2.5 mg  2.5 mg Nebulization Q4H PRN    brompheniramine-pseudoeph-DM (DIMETAPP) 2-30-10 mg/5 mL syrup 5 mL (Patient Supplied)  5 mL Oral QID PRN    fluticasone propionate (FLONASE) 50 mcg/actuation nasal spray 2 Spray  2 Spray Both Nostrils DAILY    pravastatin (PRAVACHOL) tablet 10 mg  10 mg Oral QHS    enoxaparin (LOVENOX) injection 40 mg  40 mg SubCUTAneous Q24H    sodium chloride (NS) flush 5-10 mL  5-10 mL IntraVENous PRN    dextrose 40% (GLUTOSE) oral gel 1 Tube  15 g Oral PRN    glucagon (GLUCAGEN) injection 1 mg  1 mg IntraMUSCular PRN    dextrose (D50W) injection syrg 12.5-25 g  25-50 mL IntraVENous PRN    sodium chloride (NS) flush 5-40 mL  5-40 mL IntraVENous Q8H    sodium chloride (NS) flush 5-40 mL  5-40 mL IntraVENous PRN    acetaminophen (TYLENOL) tablet 650 mg  650 mg Oral Q4H PRN    naloxone (NARCAN) injection 0.4 mg  0.4 mg IntraVENous PRN    diphenhydrAMINE (BENADRYL) injection 12.5 mg  12.5 mg IntraVENous Q4H PRN    ondansetron (ZOFRAN) injection 4 mg  4 mg IntraVENous Q4H PRN       Review of Systems  Constitutional: negative for fever, chills, sweats  Cardiovascular: negative for chest pain, palpitations, syncope, edema  Gastrointestinal:  negative for dysphagia, reflux, vomiting, diarrhea, or melena  Neurologic:  negative for focal weakness, numbness, headache    Objective:     Vitals:    08/01/19 2344 08/02/19 0508 08/02/19 0512 08/02/19 0823   BP: 130/74 123/71  122/82   Pulse: (!) 127 (!) 107  (!) 115   Resp: 24 20  18   Temp: 99.4 °F (37.4 °C) 97.7 °F (36.5 °C)  98.4 °F (36.9 °C)   SpO2: 95% 97%  97%   Weight:   166 lb 4.8 oz (75.4 kg)    Height:         Intake and Output:   07/31 1901 - 08/02 0700  In: -   Out: 9826 [[facility]:3375]  08/02 0701 - 08/02 1900  In: 360 [P.O.:360]  Out: -     Physical Exam:   Constitution:  the patient is well developed and in no acute distress  EENMT:  Sclera clear, pupils equal, oral mucosa moist  Respiratory: clear bilaterally   Cardiovascular:  RRR without M,G,R  Gastrointestinal: soft and non-tender; with positive bowel sounds. Musculoskeletal: warm without cyanosis. There is no lower extremity edema. Skin:  no jaundice or rashes, no open wounds   Neurologic: no gross neuro deficits     Psychiatric:  alert and oriented x 3     Chest CT: 7/29/19        LAB  Recent Labs     08/02/19  0709 08/01/19  2140 08/01/19  1628 08/01/19  1142 08/01/19  0746   GLUCPOC 264* 375* 477* 252* 319*      Recent Labs     08/02/19  0645 08/01/19  0605 07/31/19  0530   WBC 9.2 9.6 9.1   HGB 10.8* 10.7* 10.4*   HCT 32.4* 32.9* 31.6*    310 275     Recent Labs     08/02/19  0645 08/01/19  0605 07/31/19  0530   * 130* 133*   K 3.5 3.8 4.4   CL 94* 97* 100   CO2 26 21 18*   * 286* 265*   BUN 11 16 16   CREA 0.69* 0.66* 0.74*   MG  --   --  2.0   CA 8.8 8.9 9.0   PHOS  --   --  2.0*     No results for input(s): PH, PCO2, PO2, HCO3, PHI, PCO2I, PO2I, HCO3I in the last 72 hours. No results for input(s): LCAD, LAC in the last 72 hours.       Assessment:  (Medical Decision Making)     Hospital Problems  Date Reviewed: 8/2/2019          Codes Class Noted POA    Acute septic pulmonary embolism without acute cor pulmonale (HCC) ICD-10-CM: I26.90  ICD-9-CM: 415.12  8/1/2019 Unknown        Bacteremia due to methicillin susceptible Staphylococcus aureus (MSSA) ICD-10-CM: R78.81  ICD-9-CM: 790.7, 041.11  8/1/2019 Unknown    Antibiotics per ID     Pulmonary cavitary lesion ICD-10-CM: J98.4  ICD-9-CM: 518.89  7/30/2019 Yes        Acute pyelonephritis ICD-10-CM: N10  ICD-9-CM: 590.10  7/30/2019 Yes        * (Principal) Sepsis due to methicillin susceptible Staphylococcus aureus ([facility] ICD-10-CM: A41.01  ICD-9-CM: 038.11, 995.91  7/29/2019 Yes        DKA (diabetic ketoacidoses) ([facility] ICD-10-CM: E13.10  ICD-9-CM: 250.10  7/28/2019 Yes        DM (diabetes mellitus) ([facility] (Chronic) ICD-10-CM: E11.9  ICD-9-CM: 250.00  11/18/2013 Yes        HTN (hypertension) (Chronic) ICD-10-CM: I10  ICD-9-CM: 401.9  11/18/2013 Yes            Plan:  (Medical Decision Making)     --remains on room air   --ID following - continue Ancef. Repeat blood cultures 8/1/19 pending   --f/u CT after completion of abx course, likely in 6-8 weeks. More than 50% of the time documented was spent in face-to-face contact with the patient and in the care of the patient on the floor/unit where the patient is located. Gladys Damon NP      Lungs:  CTA B. No w/r/r  Heart:  RRR with no Murmur/Rubs/Gallops    Additional Comments:    REGGIE negative per report. Has abx plan in place. No additional pulmonary input at this time. Needs repeat CT in 6- 8 weeks with pulmonary office follow up following the CT scan. We will sign off but please let us know if new issues arise. I have spoken with and examined the patient. I agree with the above assessment and plan as documented.     Camelia Almazan MD

## 2019-08-02 NOTE — DIABETES MGMT
Girlfriend at bedside. . Blood glucose range yesterday 252-477 with pt receiving 108 units of insulin (Lantus 60 units and Humalog 48 units). Educated pt re: current basal/bolus regimen of Lantus 60 units daily, Humalog 10 units 3x/day, and Humalog sliding scale coverage 4x/day ac and hs, including type of insulin, timing with meals, onset, duration of effect, and peak of insulin dose. Pt verbalizes basic understanding. Will need review and reinforcement. Reviewed target goals for HbA1c and blood glucose and discussed the significance of an HbA1c of 11.9. Discussed the relationship between hyperglycemia and infection. Stressed the importance of follow up care for diabetes management with PCP, following a consistent carbohydrate diet, monitoring blood glucose qid and recording in log book to assist with medication titration. Pt and girlfriend have no further questions at this time.

## 2019-08-02 NOTE — PROGRESS NOTES
BGL noted. Primary RN called for update on pt's status.  Increased Lantus dose scheduled for the AM.

## 2019-08-02 NOTE — PROGRESS NOTES
Infectious Disease Note    Today's Date: 2019   Admit Date: 2019    Impression:   · MSSA bacteremia (, ) with pulmonary emboli and L pyelonephritis. No source identified. REGGIE NG.   · Uncontrolled DMII    Plan:   ·  Continue Ancef. Even though REGGIE negative, he will need 6 weeks of treatment due to pulmonary emboli. · Follow repeat BCs. Hold PICC placement until BCs no growth for 72h. · Dispo: home when ready     Anti-infectives:   · Ancef (-      Subjective:   Tm 99.4 . States he is feeling well. Wife in room. Re-iterated DM control  Allergies   Allergen Reactions    Influenza Virus Vaccine, Specific Nausea and Vomiting        Review of Systems:  A comprehensive review of systems was negative except for that written in the History of Present Illness. Objective:     Visit Vitals  /82 (BP 1 Location: Right arm, BP Patient Position: At rest)   Pulse (!) 115   Temp 98.4 °F (36.9 °C)   Resp 18   Ht 5' 11\" (1.803 m)   Wt 75.4 kg (166 lb 4.8 oz)   SpO2 97%   BMI 23.19 kg/m²     Temp (24hrs), Av.5 °F (36.9 °C), Min:97.7 °F (36.5 °C), Max:99.4 °F (37.4 °C)       Lines:  Peripheral IV:       Physical Exam:    General:  Alert, cooperative, appears stated age   Eyes:  Sclera anicteric. Pupils equally round and reactive to light. Mouth/Throat: Mucous membranes normal, oral pharynx clear   Neck: Supple   Lungs:   Clear to auscultation bilaterally, good effort   CV:  Regular rate and rhythm,no murmur, click, rub or gallop   Abdomen:   Soft, non-tender.  bowel sounds normal. non-distended. + L CVA tenderness    Extremities: No cyanosis or edema   Skin: Skin color, texture, turgor normal. no acute rash or lesions   Lymph nodes: Cervical and supraclavicular normal   Musculoskeletal: No swelling or deformity   Lines/Devices:  Intact, no erythema, drainage or tenderness   Psych: Alert and oriented, normal mood affect given the setting       Data Review:     CBC:  Recent Labs     19  0645 08/01/19  0605 07/31/19  0530   WBC 9.2 9.6 9.1   HGB 10.8* 10.7* 10.4*   HCT 32.4* 32.9* 31.6*    310 275       BMP:  Recent Labs     08/02/19  0645 08/01/19 0605 07/31/19  0530   CREA 0.69* 0.66* 0.74*   BUN 11 16 16   * 130* 133*   K 3.5 3.8 4.4   CL 94* 97* 100   CO2 26 21 18*   AGAP 11 12 15   * 286* 265*       LFTS:  No results for input(s): TBILI, ALT, SGOT, AP, TP, ALB in the last 72 hours. Microbiology:     All Micro Results     Procedure Component Value Units Date/Time    CULTURE, BLOOD [[id number]] Collected:  08/01/19 1043    Order Status:  Completed Specimen:  Blood Updated:  08/02/19 0634     Special Requests: --        RIGHT  FOREARM       Culture result: NO GROWTH AFTER 19 HOURS       CULTURE, BLOOD [[id number]] Collected:  08/01/19 1044    Order Status:  Completed Specimen:  Blood Updated:  08/02/19 0634     Special Requests: --        LEFT  FOREARM       Culture result: NO GROWTH AFTER 19 HOURS       CULTURE, BLOOD [[id number]]  (Abnormal) Collected:  07/30/19 1311    Order Status:  Completed Specimen:  Blood Updated:  08/01/19 0751     Special Requests: --        LEFT  FOREARM       GRAM STAIN       GRAM POS COCCI IN CLUSTERS            AEROBIC BOTTLE POSITIVE               CRITICAL RESULT NOT CALLED DUE TO PREVIOUS NOTIFICATION OF CRITICAL RESULT WITHIN THE LAST 24 HOURS.            Culture result: STAPHYLOCOCCUS AUREUS         FOR SUSCEPTIBILITY REFER TO ACCESSION [id number]    CULTURE, BLOOD [[id number]]  (Abnormal) Collected:  07/30/19 1316    Order Status:  Completed Specimen:  Blood Updated:  08/01/19 0750     Special Requests: --        LEFT  ARM       GRAM STAIN       GRAM POS COCCI IN CLUSTERS            AEROBIC BOTTLE POSITIVE               RESULTS VERIFIED, PHONED TO AND READ BACK BY Fabiola Simon RN AT 8656 ON [id number] MT           Culture result: STAPHYLOCOCCUS AUREUS         FOR SUSCEPTIBILITY REFER TO ACCESSION [id number]    CULTURE, URINE [[id number]]  (Abnormal) (Susceptibility) Collected:  07/28/19 1901    Order Status:  Completed Specimen:  Urine from Clean catch Updated:  07/31/19 0730     Special Requests: NO SPECIAL REQUESTS        Culture result:       >100,000 COLONIES/mL STAPHYLOCOCCUS AUREUS          CULTURE, BLOOD [[id number]]  (Abnormal)  (Susceptibility) Collected:  07/28/19 1945    Order Status:  Completed Specimen:  Blood Updated:  07/31/19 0642     Special Requests: --        RIGHT  JUGULAR VEIN       GRAM STAIN       GRAM POS COCCI IN CLUSTERS                  AEROBIC AND ANAEROBIC BOTTLES                  RESULTS VERIFIED, PHONED TO AND READ BACK BY ALFONSO [facility] [address] RN AT [address] [place] [id number] MT           Culture result: STAPHYLOCOCCUS AUREUS               STAPHYLOCOCCUS AUREUS DETECTED Test Performed by Multiplex PCR                  RESULTS VERIFIED, PHONED TO AND READ BACK BY  [place] [address] RN AT 9902 VQ [id number] MT      CULTURE, BLOOD [[id number]]  (Abnormal)  (Susceptibility) Collected:  07/28/19 2339    Order Status:  Completed Specimen:  Blood Updated:  07/31/19 0642     Special Requests: --        LEFT  HAND       GRAM STAIN       GRAM POS COCCI IN CLUSTERS            AEROBIC BOTTLE POSITIVE               CRITICAL RESULT NOT CALLED DUE TO PREVIOUS NOTIFICATION OF CRITICAL RESULT WITHIN THE LAST 24 HOURS.            Culture result: STAPHYLOCOCCUS AUREUS             Imaging:   See hPI/EPIC     Signed By: Jomar Dumont NP     August 2, 2019

## 2019-08-02 NOTE — PROGRESS NOTES
Hospitalist Note     Admit Date:  2019  7:18 PM   Name:  Dav Nunes   Age:  44 y.o.  :  1970   MRN:  [id number]   PCP:  Valerie Zhou MD  Treatment Team: Attending Provider: Kristie Olivarez MD; Care Manager: Marzella Kayser, RN; Utilization Review: Jose Elias Ca, SAM; Consulting Provider: Melissa Kate MD; Consulting Provider: Jessee Leger MD; Consulting Provider: Luis Eduardo Holliday MD; Primary Nurse: Chica Dasilva    Mr. Alba Yee is a 43 y/o M with DM who presented to ER with weakness, chills, abd pain. Met sepsis criteria for possible UTI. Found to be in DKA. Admitted to ICU on rocephin, insulin gtt and IVFs. Blood and urine cultures came back with MSSA bacteremia. Switched to ancef. CT abd ordered for pain and found cavitary lesions in lung, also seen on CT chest.  pulm and ID on board. Echo showed no vegetations, but new onset systolic HF EF 20-77%. REGGIE by cardiology on 19 showed no endocarditis. The patient would like home iv antibiotic therapy. CM arranged teaching by [facility]. Currently we are awaiting repeated BC results in order to order PICC line. Subjective:   He complains of left upper abdominal pain. Denied fever, chills.       Objective:     Patient Vitals for the past 24 hrs:   Temp Pulse Resp BP SpO2   19 0508 97.7 °F (36.5 °C) (!) 107 20 123/71 97 %   19 2344 99.4 °F (37.4 °C) (!) 127 24 130/74 95 %   19 1951 98 °F (36.7 °C) (!) 124 24 140/71 98 %   19 1603 98.6 °F (37 °C) 100 24 118/72 97 %   19 1142 98.7 °F (37.1 °C) (!) 102 22 124/66 98 %   19 1010  (!) 113 24 111/71 95 %   19 1000  (!) 114 25 109/69 96 %   19 0950  (!) 116 23 103/64 97 %   19 0947  (!) 116 22 106/64 97 %   19 0945  (!) 114 23 109/68 97 %   19 0942  (!) 112 20 114/80 97 %   19 0940  (!) 107 22 123/78 97 %   19 0937  (!) 111 21 139/88 98 %   19 0934  (!) 115 25 106/70 98 % 08/01/19 0932  (!) 117 25 105/71 98 %   08/01/19 0930  (!) 116 25 109/70 98 %   08/01/19 0928  (!) 114 25 115/75 98 %   08/01/19 0925  (!) 110 23 104/75 98 %   08/01/19 0923  (!) 112 24 122/78 98 %   08/01/19 0920  (!) 107 24 119/77 98 %   08/01/19 0918  (!) 108 22 123/81 98 %   08/01/19 0914  (!) 109 22 123/80 95 %   08/01/19 0857  (!) 108 20 133/85 98 %     Oxygen Therapy  O2 Sat (%): 97 % (08/02/19 0508)  Pulse via Oximetry: 114 beats per minute (08/01/19 1010)  O2 Device: Room air (08/01/19 2000)  O2 Flow Rate (L/min): 1 l/min (08/01/19 0950)    No intake or output data in the 24 hours ending 08/02/19 0805    *Note that automatically entered I/Os may not be accurate; dependent on patient compliance with collection and accurate  by techs. General:    Well nourished. Alert. CV:  reg. No murmur, rub, or gallop. Lungs:   CTAB. No wheezing, rhonchi, or rales. Abdomen:   Mild pain over his left upper abdomen. non-distended, no rebound, normal BS   Extremities: Warm and dry. No cyanosis or edema. Skin:     No rashes or jaundice.    Neuro:  No gross focal deficits    Data Review:  I have reviewed all labs, meds, and studies from the last 24 hours:    Recent Results (from the past 24 hour(s))   CULTURE, BLOOD    Collection Time: 08/01/19 10:43 AM   Result Value Ref Range    Special Requests: RIGHT  FOREARM        Culture result: NO GROWTH AFTER 19 HOURS     CULTURE, BLOOD    Collection Time: 08/01/19 10:44 AM   Result Value Ref Range    Special Requests: LEFT  FOREARM        Culture result: NO GROWTH AFTER 19 HOURS     GLUCOSE, POC    Collection Time: 08/01/19 11:42 AM   Result Value Ref Range    Glucose (POC) 252 (H) 65 - 100 mg/dL   GLUCOSE, POC    Collection Time: 08/01/19  4:28 PM   Result Value Ref Range    Glucose (POC) 477 (HH) 65 - 100 mg/dL   GLUCOSE, POC    Collection Time: 08/01/19  9:40 PM   Result Value Ref Range    Glucose (POC) 375 (H) 65 - 100 mg/dL   CBC W/O DIFF Collection Time: 08/02/19  6:45 AM   Result Value Ref Range    WBC 9.2 4.3 - 11.1 K/uL    RBC 4.10 (L) 4.23 - 5.6 M/uL    HGB 10.8 (L) 13.6 - 17.2 g/dL    HCT 32.4 (L) 41.1 - 50.3 %    MCV 79.0 (L) 79.6 - 97.8 FL    MCH 26.3 26.1 - 32.9 PG    MCHC 33.3 31.4 - 35.0 g/dL    RDW 14.6 11.9 - 14.6 %    PLATELET 324 653 - 659 K/uL    MPV 9.6 9.4 - 12.3 FL    ABSOLUTE NRBC 0.00 0.0 - 0.2 K/uL   METABOLIC PANEL, BASIC    Collection Time: 08/02/19  6:45 AM   Result Value Ref Range    Sodium 131 (L) 136 - 145 mmol/L    Potassium 3.5 3.5 - 5.1 mmol/L    Chloride 94 (L) 98 - 107 mmol/L    CO2 26 21 - 32 mmol/L    Anion gap 11 7 - 16 mmol/L    Glucose 278 (H) 65 - 100 mg/dL    BUN 11 6 - 23 MG/DL    Creatinine 0.69 (L) 0.8 - 1.5 MG/DL    GFR est AA >60 >60 ml/min/1.73m2    GFR est non-AA >60 >60 ml/min/1.73m2    Calcium 8.8 8.3 - 10.4 MG/DL   GLUCOSE, POC    Collection Time: 08/02/19  7:09 AM   Result Value Ref Range    Glucose (POC) 264 (H) 65 - 100 mg/dL        All Micro Results     Procedure Component Value Units Date/Time    CULTURE, BLOOD [[id number]] Collected:  08/01/19 1043    Order Status:  Completed Specimen:  Blood Updated:  08/02/19 0634     Special Requests: --        RIGHT  FOREARM       Culture result: NO GROWTH AFTER 19 HOURS       CULTURE, BLOOD [[id number]] Collected:  08/01/19 1044    Order Status:  Completed Specimen:  Blood Updated:  08/02/19 0634     Special Requests: --        LEFT  FOREARM       Culture result: NO GROWTH AFTER 19 HOURS       CULTURE, BLOOD [[id number]]  (Abnormal) Collected:  07/30/19 1311    Order Status:  Completed Specimen:  Blood Updated:  08/01/19 0751     Special Requests: --        LEFT  FOREARM       GRAM STAIN       GRAM POS COCCI IN CLUSTERS            AEROBIC BOTTLE POSITIVE               CRITICAL RESULT NOT CALLED DUE TO PREVIOUS NOTIFICATION OF CRITICAL RESULT WITHIN THE LAST 24 HOURS.            Culture result: STAPHYLOCOCCUS AUREUS         FOR SUSCEPTIBILITY REFER TO ACCESSION [id number]    CULTURE, BLOOD [[id number]]  (Abnormal) Collected:  07/30/19 1316    Order Status:  Completed Specimen:  Blood Updated:  08/01/19 0750     Special Requests: --        LEFT  ARM       GRAM STAIN       GRAM POS COCCI IN CLUSTERS            AEROBIC BOTTLE POSITIVE               RESULTS VERIFIED, PHONED TO AND READ BACK BY Jacob Meza RN AT 0158 ON [id number] MT           Culture result: STAPHYLOCOCCUS AUREUS         FOR SUSCEPTIBILITY REFER TO ACCESSION [id number]    CULTURE, URINE [[id number]]  (Abnormal)  (Susceptibility) Collected:  07/28/19 1901    Order Status:  Completed Specimen:  Urine from Clean catch Updated:  07/31/19 0730     Special Requests: NO SPECIAL REQUESTS        Culture result:       >100,000 COLONIES/mL STAPHYLOCOCCUS AUREUS          CULTURE, BLOOD [[id number]]  (Abnormal)  (Susceptibility) Collected:  07/28/19 1945    Order Status:  Completed Specimen:  Blood Updated:  07/31/19 0642     Special Requests: --        RIGHT  JUGULAR VEIN       GRAM STAIN       GRAM POS COCCI IN CLUSTERS                  AEROBIC AND ANAEROBIC BOTTLES                  RESULTS VERIFIED, PHONED TO AND READ BACK BY ALFONSO [address] RN AT [address] [place] [id number] MT           Culture result: STAPHYLOCOCCUS AUREUS               STAPHYLOCOCCUS AUREUS DETECTED Test Performed by Multiplex PCR                  RESULTS VERIFIED, PHONED TO AND READ BACK BY  ALFONSO [address] RN AT 3813 XR [id number] MT      CULTURE, BLOOD [[id number]]  (Abnormal)  (Susceptibility) Collected:  07/28/19 2339    Order Status:  Completed Specimen:  Blood Updated:  07/31/19 0642     Special Requests: --        LEFT  HAND       GRAM STAIN       GRAM POS COCCI IN CLUSTERS            AEROBIC BOTTLE POSITIVE               CRITICAL RESULT NOT CALLED DUE TO PREVIOUS NOTIFICATION OF CRITICAL RESULT WITHIN THE LAST 24 HOURS.            Culture result: STAPHYLOCOCCUS AUREUS             Results for orders placed or performed during the hospital encounter of 07/28/19   2D ECHO COMPLETE ADULT (TTE) W OR WO CONTR    Narrative    Report creation error on 7/30/2019 12:03:58 PM    An error occurred while generating this report. The most common cause of such  errors are incomplete, duplicate, or corrupted findings. To fix this error:  return to the Outline Viewer, click the Tools button, and select \"Check  findings\". If the problem persists, contact your IS .        Current Meds:  Current Facility-Administered Medications   Medication Dose Route Frequency    fentaNYL citrate (PF) injection  mcg   mcg IntraVENous Multiple    midazolam (VERSED) injection 0.5-5 mg  0.5-5 mg IntraVENous Multiple    insulin glargine (LANTUS) injection 60 Units  60 Units SubCUTAneous DAILY    insulin lispro (HUMALOG) injection 8 Units  8 Units SubCUTAneous TIDAC    metoprolol succinate (TOPROL-XL) tablet 100 mg  100 mg Oral DAILY    lisinopril (PRINIVIL, ZESTRIL) tablet 5 mg  5 mg Oral DAILY    pantoprazole (PROTONIX) tablet 40 mg  40 mg Oral ACB    potassium, sodium phosphates (NEUTRA-PHOS) packet 1 Packet  1 Packet Oral QID    NUTRITIONAL SUPPORT ELECTROLYTE PRN ORDERS   Does Not Apply PRN    ceFAZolin (ANCEF) 2 g/20 mL in sterile water IV syringe  2 g IntraVENous Q8H    insulin lispro (HUMALOG) injection 0-10 Units  0-10 Units SubCUTAneous AC&HS    albuterol (PROVENTIL VENTOLIN) nebulizer solution 2.5 mg  2.5 mg Nebulization Q4H PRN    brompheniramine-pseudoeph-DM (DIMETAPP) 2-30-10 mg/5 mL syrup 5 mL (Patient Supplied)  5 mL Oral QID PRN    fluticasone propionate (FLONASE) 50 mcg/actuation nasal spray 2 Spray  2 Spray Both Nostrils DAILY    pravastatin (PRAVACHOL) tablet 10 mg  10 mg Oral QHS    enoxaparin (LOVENOX) injection 40 mg  40 mg SubCUTAneous Q24H    sodium chloride (NS) flush 5-10 mL  5-10 mL IntraVENous PRN    dextrose 40% (GLUTOSE) oral gel 1 Tube  15 g Oral PRN    glucagon (GLUCAGEN) injection 1 mg  1 mg IntraMUSCular PRN    dextrose (D50W) injection syrg 12.5-25 g  25-50 mL IntraVENous PRN    sodium chloride (NS) flush 5-40 mL  5-40 mL IntraVENous Q8H    sodium chloride (NS) flush 5-40 mL  5-40 mL IntraVENous PRN    acetaminophen (TYLENOL) tablet 650 mg  650 mg Oral Q4H PRN    naloxone (NARCAN) injection 0.4 mg  0.4 mg IntraVENous PRN    diphenhydrAMINE (BENADRYL) injection 12.5 mg  12.5 mg IntraVENous Q4H PRN    ondansetron (ZOFRAN) injection 4 mg  4 mg IntraVENous Q4H PRN       Other Studies (last 24 hours):  No results found.     Assessment and Plan:     Hospital Problems as of 8/2/2019 Date Reviewed: 7/30/2019          Codes Class Noted - Resolved POA    Acute septic pulmonary embolism without acute cor pulmonale (HCC) ICD-10-CM: I26.90  ICD-9-CM: 415.12  8/1/2019 - Present Unknown        Bacteremia due to methicillin susceptible Staphylococcus aureus (MSSA) ICD-10-CM: R78.81  ICD-9-CM: 790.7, 041.11  8/1/2019 - Present Unknown        Pulmonary cavitary lesion ICD-10-CM: J98.4  ICD-9-CM: 518.89  7/30/2019 - Present Yes        Acute pyelonephritis ICD-10-CM: N10  ICD-9-CM: 590.10  7/30/2019 - Present Yes        * (Principal) Sepsis due to methicillin susceptible Staphylococcus aureus ([facility] ICD-10-CM: A41.01  ICD-9-CM: 038.11, 995.91  7/29/2019 - Present Yes        DKA (diabetic ketoacidoses) ([facility] ICD-10-CM: E13.10  ICD-9-CM: 250.10  7/28/2019 - Present Yes        DM (diabetes mellitus) ([facility] (Chronic) ICD-10-CM: E11.9  ICD-9-CM: 250.00  11/18/2013 - Present Yes        HTN (hypertension) (Chronic) ICD-10-CM: I10  ICD-9-CM: 401.9  11/18/2013 - Present Yes              Plan:    -DKA: resolved  Uncontrolled DM  On lantus to 60 units, increase humalog 10  units premeals   Humalog SS  DM management following    -MSSA bacteremia / UTI:   -Cavitary pulmonary lesions, possible septic emboli:  REGGIE on 8/1 showed no endocarditis   Continue ancef, total of 6 weeks   Pulmonary signed off  ID on board  CM arranged intramed teaching for outpatient antibiotics   Follow up repeated blood cultures, if okay, will order PICC line on Monday and discharge by then     -New, systolic HF EF 61-55%  on BB, ACEI  Cardiology on board     -Hypophosphatemia:  neutra phosp     DC planning/Disposition: home once repeated [facility] results are back and PICC line can be placed on Monday.      Diet:  DIET DIABETIC WITH OPTIONS  DVT ppx:  lovenox    Signed:  Yana Li MD

## 2019-08-02 NOTE — PROGRESS NOTES
Interdisciplinary Rounds completed 08/02/19. Nursing, Case Management, Physician and PT present. Plan of care reviewed and updated. Probable d/c Monday.

## 2019-08-03 LAB
ANION GAP SERPL CALC-SCNC: 10 MMOL/L (ref 7–16)
BUN SERPL-MCNC: 8 MG/DL (ref 6–23)
CALCIUM SERPL-MCNC: 8.5 MG/DL (ref 8.3–10.4)
CHLORIDE SERPL-SCNC: 94 MMOL/L (ref 98–107)
CO2 SERPL-SCNC: 27 MMOL/L (ref 21–32)
CREAT SERPL-MCNC: 0.69 MG/DL (ref 0.8–1.5)
ERYTHROCYTE [DISTWIDTH] IN BLOOD BY AUTOMATED COUNT: 14.2 % (ref 11.9–14.6)
GLUCOSE BLD STRIP.AUTO-MCNC: 254 MG/DL (ref 65–100)
GLUCOSE BLD STRIP.AUTO-MCNC: 266 MG/DL (ref 65–100)
GLUCOSE BLD STRIP.AUTO-MCNC: 289 MG/DL (ref 65–100)
GLUCOSE BLD STRIP.AUTO-MCNC: 292 MG/DL (ref 65–100)
GLUCOSE SERPL-MCNC: 261 MG/DL (ref 65–100)
HCT VFR BLD AUTO: 31.5 % (ref 41.1–50.3)
HGB BLD-MCNC: 10.3 G/DL (ref 13.6–17.2)
MCH RBC QN AUTO: 26.1 PG (ref 26.1–32.9)
MCHC RBC AUTO-ENTMCNC: 32.7 G/DL (ref 31.4–35)
MCV RBC AUTO: 79.9 FL (ref 79.6–97.8)
NRBC # BLD: 0 K/UL (ref 0–0.2)
PLATELET # BLD AUTO: 306 K/UL (ref 150–450)
PMV BLD AUTO: 9.5 FL (ref 9.4–12.3)
POTASSIUM SERPL-SCNC: 3.6 MMOL/L (ref 3.5–5.1)
RBC # BLD AUTO: 3.94 M/UL (ref 4.23–5.6)
SODIUM SERPL-SCNC: 131 MMOL/L (ref 136–145)
WBC # BLD AUTO: 9.4 K/UL (ref 4.3–11.1)

## 2019-08-03 PROCEDURE — 74011636637 HC RX REV CODE- 636/637: Performed by: INTERNAL MEDICINE

## 2019-08-03 PROCEDURE — 74011250636 HC RX REV CODE- 250/636: Performed by: INTERNAL MEDICINE

## 2019-08-03 PROCEDURE — 36415 COLL VENOUS BLD VENIPUNCTURE: CPT

## 2019-08-03 PROCEDURE — 80048 BASIC METABOLIC PNL TOTAL CA: CPT

## 2019-08-03 PROCEDURE — 65270000029 HC RM PRIVATE

## 2019-08-03 PROCEDURE — 74011250637 HC RX REV CODE- 250/637: Performed by: INTERNAL MEDICINE

## 2019-08-03 PROCEDURE — 82962 GLUCOSE BLOOD TEST: CPT

## 2019-08-03 PROCEDURE — 74011250637 HC RX REV CODE- 250/637: Performed by: PHYSICIAN ASSISTANT

## 2019-08-03 PROCEDURE — 87040 BLOOD CULTURE FOR BACTERIA: CPT

## 2019-08-03 PROCEDURE — 85027 COMPLETE CBC AUTOMATED: CPT

## 2019-08-03 RX ADMIN — ENOXAPARIN SODIUM 40 MG: 40 INJECTION SUBCUTANEOUS at 09:55

## 2019-08-03 RX ADMIN — Medication 2 G: at 17:25

## 2019-08-03 RX ADMIN — TRAMADOL HYDROCHLORIDE 50 MG: 50 TABLET, FILM COATED ORAL at 02:11

## 2019-08-03 RX ADMIN — POTASSIUM & SODIUM PHOSPHATES POWDER PACK 280-160-250 MG 1 PACKET: 280-160-250 PACK at 17:31

## 2019-08-03 RX ADMIN — INSULIN LISPRO 10 UNITS: 100 INJECTION, SOLUTION INTRAVENOUS; SUBCUTANEOUS at 16:55

## 2019-08-03 RX ADMIN — POTASSIUM & SODIUM PHOSPHATES POWDER PACK 280-160-250 MG 1 PACKET: 280-160-250 PACK at 13:00

## 2019-08-03 RX ADMIN — ONDANSETRON 4 MG: 2 INJECTION INTRAMUSCULAR; INTRAVENOUS at 21:40

## 2019-08-03 RX ADMIN — Medication 2 G: at 09:30

## 2019-08-03 RX ADMIN — INSULIN LISPRO 10 UNITS: 100 INJECTION, SOLUTION INTRAVENOUS; SUBCUTANEOUS at 11:57

## 2019-08-03 RX ADMIN — PANTOPRAZOLE SODIUM 40 MG: 40 TABLET, DELAYED RELEASE ORAL at 05:10

## 2019-08-03 RX ADMIN — INSULIN LISPRO 6 UNITS: 100 INJECTION, SOLUTION INTRAVENOUS; SUBCUTANEOUS at 16:55

## 2019-08-03 RX ADMIN — METOPROLOL SUCCINATE 100 MG: 100 TABLET, EXTENDED RELEASE ORAL at 09:56

## 2019-08-03 RX ADMIN — Medication 10 ML: at 05:10

## 2019-08-03 RX ADMIN — TRAMADOL HYDROCHLORIDE 50 MG: 50 TABLET, FILM COATED ORAL at 21:40

## 2019-08-03 RX ADMIN — INSULIN LISPRO 6 UNITS: 100 INJECTION, SOLUTION INTRAVENOUS; SUBCUTANEOUS at 11:57

## 2019-08-03 RX ADMIN — INSULIN LISPRO 6 UNITS: 100 INJECTION, SOLUTION INTRAVENOUS; SUBCUTANEOUS at 07:46

## 2019-08-03 RX ADMIN — PRAVASTATIN SODIUM 10 MG: 20 TABLET ORAL at 21:40

## 2019-08-03 RX ADMIN — Medication 10 ML: at 13:28

## 2019-08-03 RX ADMIN — POTASSIUM & SODIUM PHOSPHATES POWDER PACK 280-160-250 MG 1 PACKET: 280-160-250 PACK at 09:56

## 2019-08-03 RX ADMIN — POTASSIUM & SODIUM PHOSPHATES POWDER PACK 280-160-250 MG 1 PACKET: 280-160-250 PACK at 21:40

## 2019-08-03 RX ADMIN — INSULIN GLARGINE 60 UNITS: 100 INJECTION, SOLUTION SUBCUTANEOUS at 09:30

## 2019-08-03 RX ADMIN — INSULIN LISPRO 6 UNITS: 100 INJECTION, SOLUTION INTRAVENOUS; SUBCUTANEOUS at 21:41

## 2019-08-03 RX ADMIN — INSULIN LISPRO 10 UNITS: 100 INJECTION, SOLUTION INTRAVENOUS; SUBCUTANEOUS at 07:47

## 2019-08-03 RX ADMIN — Medication 2 G: at 00:27

## 2019-08-03 NOTE — PROGRESS NOTES
END OF SHIFT NOTE:    INTAKE/OUTPUT  08/01 0701 - 08/02 0700  In: -   Out: 200 [Urine:200]  Voiding: YES  Catheter: NO  Color: clear        DIET  Diabetic    Flatus: Patient does have flatus present. Stool:  0 occurrences. Characteristics:       Ambulating  Yes    Emesis: 0 occurrences. +Nausea. Given prn zofran x1.   Characteristics:          VITAL SIGNS  Patient Vitals for the past 12 hrs:   Temp Pulse Resp BP SpO2   08/02/19 1745 98.8 °F (37.1 °C) 89 18 108/67 97 %   08/02/19 1150 98 °F (36.7 °C) (!) 106 18 144/78 96 %   08/02/19 0823 98.4 °F (36.9 °C) (!) 115 18 122/82 97 %       Pain Assessment  Pain Intensity 1: 4 (08/02/19 1317)  Pain Location 1: Back  Pain Intervention(s) 1: Distraction, Emotional support, Environmental changes, Family Support, Position, Repositioned, Rest, Therapeutic presence  Patient Stated Pain Goal: 0            Lori Gaines RN

## 2019-08-03 NOTE — PROGRESS NOTES
Hospitalist Note     Admit Date:  2019  7:18 PM   Name:  Catie Pimentel   Age:  44 y.o.  :  1970   MRN:  [id number]   PCP:  Fitz Rueda MD  Treatment Team: Attending Provider: Abran Fernandez MD; Care Manager: Ernie Flores, SAM; Utilization Review: Bravo Fuentes RN; Consulting Provider: Radha Morley MD; Consulting Provider: Anna Brown MD    Mr. Tony Enriquez is a 43 y/o M with DM who presented to ER with weakness, chills, abd pain. Met sepsis criteria for possible UTI. Found to be in DKA. Admitted to ICU on rocephin, insulin gtt and IVFs. Blood and urine cultures came back with MSSA bacteremia. Switched to ancef. CT abd ordered for pain and found cavitary lesions in lung, also seen on CT chest.  pulm and ID on board. Echo showed no vegetations, but new onset systolic HF EF 95-06%. REGGIE by cardiology on 19 showed no endocarditis. The patient would like home iv antibiotic therapy. CM arranged teaching by [facility]. Currently we are awaiting repeated BC results in order to order PICC line. Subjective:   Patient was seen at bedside. Blood cultures from  still positive. New set of [facility] ordered today.      Objective:     Patient Vitals for the past 24 hrs:   Temp Pulse Resp BP SpO2   19 1628 98.6 °F (37 °C) 97 16 119/80 97 %   19 1217 98.1 °F (36.7 °C) 93 15 127/69 97 %   19 0733 98.4 °F (36.9 °C) (!) 103 16 106/62 99 %   19 0455 98.2 °F (36.8 °C) 100 18 111/68 95 %   19 2342 98 °F (36.7 °C) (!) 103 18 118/75 97 %     Oxygen Therapy  O2 Sat (%): 97 % (19 1628)  Pulse via Oximetry: 114 beats per minute (19 1010)  O2 Device: Room air (19)  O2 Flow Rate (L/min): 1 l/min (19 0950)      Intake/Output Summary (Last 24 hours) at 8/3/2019 1752  Last data filed at 8/3/2019 1350  Gross per 24 hour   Intake 720 ml   Output 200 ml   Net 520 ml       *Note that automatically entered I/Os may not be accurate; dependent on patient compliance with collection and accurate  by [employer]. General:    Well nourished. Alert. CV:  reg. No murmur, rub, or gallop. Lungs:   CTAB. No wheezing, rhonchi, or rales. Abdomen:   Mild pain over his left upper abdomen. non-distended, no rebound, normal BS   Extremities: Warm and dry. No cyanosis or edema. Skin:     No rashes or jaundice.    Neuro:  No gross focal deficits    Data Review:  I have reviewed all labs, meds, and studies from the last 24 hours:    Recent Results (from the past 24 hour(s))   GLUCOSE, POC    Collection Time: 08/02/19 10:26 PM   Result Value Ref Range    Glucose (POC) 276 (H) 65 - 100 mg/dL   CBC W/O DIFF    Collection Time: 08/03/19  5:52 AM   Result Value Ref Range    WBC 9.4 4.3 - 11.1 K/uL    RBC 3.94 (L) 4.23 - 5.6 M/uL    HGB 10.3 (L) 13.6 - 17.2 g/dL    HCT 31.5 (L) 41.1 - 50.3 %    MCV 79.9 79.6 - 97.8 FL    MCH 26.1 26.1 - 32.9 PG    MCHC 32.7 31.4 - 35.0 g/dL    RDW 14.2 11.9 - 14.6 %    PLATELET 318 972 - 339 K/uL    MPV 9.5 9.4 - 12.3 FL    ABSOLUTE NRBC 0.00 0.0 - 0.2 K/uL   METABOLIC PANEL, BASIC    Collection Time: 08/03/19  5:52 AM   Result Value Ref Range    Sodium 131 (L) 136 - 145 mmol/L    Potassium 3.6 3.5 - 5.1 mmol/L    Chloride 94 (L) 98 - 107 mmol/L    CO2 27 21 - 32 mmol/L    Anion gap 10 7 - 16 mmol/L    Glucose 261 (H) 65 - 100 mg/dL    BUN 8 6 - 23 MG/DL    Creatinine 0.69 (L) 0.8 - 1.5 MG/DL    GFR est AA >60 >60 ml/min/1.73m2    GFR est non-AA >60 >60 ml/min/1.73m2    Calcium 8.5 8.3 - 10.4 MG/DL   GLUCOSE, POC    Collection Time: 08/03/19  7:31 AM   Result Value Ref Range    Glucose (POC) 254 (H) 65 - 100 mg/dL   GLUCOSE, POC    Collection Time: 08/03/19 10:55 AM   Result Value Ref Range    Glucose (POC) 266 (H) 65 - 100 mg/dL   GLUCOSE, POC    Collection Time: 08/03/19  4:28 PM   Result Value Ref Range    Glucose (POC) 292 (H) 65 - 100 mg/dL        All Micro Results     Procedure Component Value Units Date/Time    CULTURE, BLOOD [[id number]] Collected:  08/03/19 1008    Order Status:  Completed Specimen:  Blood Updated:  08/03/19 1256    CULTURE, BLOOD [[id number]] Collected:  08/03/19 1014    Order Status:  Completed Specimen:  Blood Updated:  08/03/19 1256    CULTURE, BLOOD [[id number]] Collected:  08/01/19 1043    Order Status:  Completed Specimen:  Blood Updated:  08/03/19 0713     Special Requests: --        RIGHT  FOREARM       Culture result: NO GROWTH 2 DAYS       CULTURE, BLOOD [[id number]]  (Abnormal) Collected:  08/01/19 1044    Order Status:  Completed Specimen:  Blood Updated:  08/03/19 0640     Special Requests: --        LEFT  FOREARM       GRAM STAIN       GRAM POS COCCI IN CLUSTERS            AEROBIC BOTTLE POSITIVE               RESULTS VERIFIED, PHONED TO AND READ BACK BY Haily Miranda RN @0835 8/2/19            Culture result:       STAPHYLOCOCCUS AUREUS SENSITIVITY TO FOLLOW                  MRSA target DNA sequences not detected: SA target DNA sequence detected within the sample. Test performed by PCR  Culture/Sensitivity to follow          CULTURE, BLOOD [[id number]]  (Abnormal) Collected:  07/30/19 1311    Order Status:  Completed Specimen:  Blood Updated:  08/01/19 0751     Special Requests: --        LEFT  FOREARM       GRAM STAIN       GRAM POS COCCI IN CLUSTERS            AEROBIC BOTTLE POSITIVE               CRITICAL RESULT NOT CALLED DUE TO PREVIOUS NOTIFICATION OF CRITICAL RESULT WITHIN THE LAST 24 HOURS.            Culture result: STAPHYLOCOCCUS AUREUS         FOR SUSCEPTIBILITY REFER TO ACCESSION [id number]    CULTURE, BLOOD [[id number]]  (Abnormal) Collected:  07/30/19 1316    Order Status:  Completed Specimen:  Blood Updated:  08/01/19 0750     Special Requests: --        LEFT  ARM       GRAM STAIN       GRAM POS COCCI IN CLUSTERS            AEROBIC BOTTLE POSITIVE               RESULTS VERIFIED, PHONED TO AND READ BACK BY Charles Isbell RN AT 0700 ON [id number] MT           Culture result: STAPHYLOCOCCUS AUREUS FOR SUSCEPTIBILITY REFER TO ACCESSION [id number]    CULTURE, URINE [[id number]]  (Abnormal)  (Susceptibility) Collected:  07/28/19 1901    Order Status:  Completed Specimen:  Urine from Clean catch Updated:  07/31/19 0730     Special Requests: NO SPECIAL REQUESTS        Culture result:       >100,000 COLONIES/mL STAPHYLOCOCCUS AUREUS          CULTURE, BLOOD [[id number]]  (Abnormal)  (Susceptibility) Collected:  07/28/19 1945    Order Status:  Completed Specimen:  Blood Updated:  07/31/19 0642     Special Requests: --        RIGHT  JUGULAR VEIN       GRAM STAIN       GRAM POS COCCI IN CLUSTERS                  AEROBIC AND ANAEROBIC BOTTLES                  RESULTS VERIFIED, PHONED TO AND READ BACK BY [address] RN  [place] [id number] [place]           Culture result: STAPHYLOCOCCUS AUREUS               STAPHYLOCOCCUS AUREUS DETECTED Test Performed by Multiplex PCR                  RESULTS VERIFIED, PHONED TO AND READ BACK BY  [address] RN AT 1460 QZ [id number] MT      CULTURE, BLOOD [[id number]]  (Abnormal)  (Susceptibility) Collected:  07/28/19 2339    Order Status:  Completed Specimen:  Blood Updated:  07/31/19 0642     Special Requests: --        LEFT  HAND       GRAM STAIN       GRAM POS COCCI IN CLUSTERS            AEROBIC BOTTLE POSITIVE               CRITICAL RESULT NOT CALLED DUE TO PREVIOUS NOTIFICATION OF CRITICAL RESULT WITHIN THE LAST 24 HOURS. Culture result: STAPHYLOCOCCUS AUREUS             Results for orders placed or performed during the hospital encounter of 07/28/19   2D ECHO COMPLETE ADULT (TTE) W OR WO CONTR    Narrative    Report creation error on 7/30/2019 12:03:58 PM    An error occurred while generating this report. The most common cause of such  errors are incomplete, duplicate, or corrupted findings. To fix this error:  return to the Outline Viewer, click the Tools button, and select \"Check  findings\". If the problem persists, contact your IS .        Current Meds:  Current Facility-Administered Medications   Medication Dose Route Frequency    insulin lispro (HUMALOG) injection 10 Units  10 Units SubCUTAneous TIDAC    traMADol (ULTRAM) tablet 50 mg  50 mg Oral Q6H PRN    fentaNYL citrate (PF) injection  mcg   mcg IntraVENous Multiple    midazolam (VERSED) injection 0.5-5 mg  0.5-5 mg IntraVENous Multiple    insulin glargine (LANTUS) injection 60 Units  60 Units SubCUTAneous DAILY    metoprolol succinate (TOPROL-XL) tablet 100 mg  100 mg Oral DAILY    pantoprazole (PROTONIX) tablet 40 mg  40 mg Oral ACB    potassium, sodium phosphates (NEUTRA-PHOS) packet 1 Packet  1 Packet Oral QID    NUTRITIONAL SUPPORT ELECTROLYTE PRN ORDERS   Does Not Apply PRN    ceFAZolin (ANCEF) 2 g/20 mL in sterile water IV syringe  2 g IntraVENous Q8H    insulin lispro (HUMALOG) injection 0-10 Units  0-10 Units SubCUTAneous AC&HS    albuterol (PROVENTIL VENTOLIN) nebulizer solution 2.5 mg  2.5 mg Nebulization Q4H PRN    brompheniramine-pseudoeph-DM (DIMETAPP) 2-30-10 mg/5 mL syrup 5 mL (Patient Supplied)  5 mL Oral QID PRN    fluticasone propionate (FLONASE) 50 mcg/actuation nasal spray 2 Spray  2 Spray Both Nostrils DAILY    pravastatin (PRAVACHOL) tablet 10 mg  10 mg Oral QHS    enoxaparin (LOVENOX) injection 40 mg  40 mg SubCUTAneous Q24H    sodium chloride (NS) flush 5-10 mL  5-10 mL IntraVENous PRN    dextrose 40% (GLUTOSE) oral gel 1 Tube  15 g Oral PRN    glucagon (GLUCAGEN) injection 1 mg  1 mg IntraMUSCular PRN    dextrose (D50W) injection syrg 12.5-25 g  25-50 mL IntraVENous PRN    sodium chloride (NS) flush 5-40 mL  5-40 mL IntraVENous Q8H    sodium chloride (NS) flush 5-40 mL  5-40 mL IntraVENous PRN    acetaminophen (TYLENOL) tablet 650 mg  650 mg Oral Q4H PRN    naloxone (NARCAN) injection 0.4 mg  0.4 mg IntraVENous PRN    diphenhydrAMINE (BENADRYL) injection 12.5 mg  12.5 mg IntraVENous Q4H PRN    ondansetron (ZOFRAN) injection 4 mg  4 mg IntraVENous Q4H PRN Other Studies (last 24 hours):  No results found. Assessment and Plan:     Hospital Problems as of 8/3/2019 Date Reviewed: 8/2/2019          Codes Class Noted - Resolved POA    Acute septic pulmonary embolism without acute cor pulmonale (HCC) ICD-10-CM: I26.90  ICD-9-CM: 415.12  8/1/2019 - Present Unknown        Bacteremia due to methicillin susceptible Staphylococcus aureus (MSSA) ICD-10-CM: R78.81  ICD-9-CM: 790.7, 041.11  8/1/2019 - Present Unknown        Pulmonary cavitary lesion ICD-10-CM: J98.4  ICD-9-CM: 518.89  7/30/2019 - Present Yes        Acute pyelonephritis ICD-10-CM: N10  ICD-9-CM: 590.10  7/30/2019 - Present Yes        * (Principal) Sepsis due to methicillin susceptible Staphylococcus aureus ([facility] ICD-10-CM: A41.01  ICD-9-CM: 038.11, 995.91  7/29/2019 - Present Yes        DKA (diabetic ketoacidoses) ([facility] ICD-10-CM: E13.10  ICD-9-CM: 250.10  7/28/2019 - Present Yes        DM (diabetes mellitus) ([facility] (Chronic) ICD-10-CM: E11.9  ICD-9-CM: 250.00  11/18/2013 - Present Yes        HTN (hypertension) (Chronic) ICD-10-CM: I10  ICD-9-CM: 401.9  11/18/2013 - Present Yes              Plan:    -DKA: resolved  Uncontrolled DM  On lantus to 60 units and  humalog 10  units premeals   Humalog SS  DM management following    -MSSA bacteremia / UTI:   -Cavitary pulmonary lesions, possible septic emboli:  REGGIE on 8/1 showed no endocarditis   Continue ancef, total of 6 weeks   ID on board  BC from 8/1 still positive.  New set of BC ordered today     -New, systolic HF EF 81-73%  on BB, ACEI  Cardiology on board           Diet:  DIET DIABETIC WITH OPTIONS  DVT ppx:  lovenox    Signed:  Martinez Jimenes MD

## 2019-08-03 NOTE — PROGRESS NOTES
MD Damari Burton at station. Ok to d/c lisinpril. Repeated and verified. MD reviewed trend of VS with RN.

## 2019-08-03 NOTE — PROGRESS NOTES
Monica Dacosta with the lab called with the following critical value:    BC from 8/1 drawn at approximately 1045 from L-FA resulted in [address] [place] in clusters in AEROBIC bottle. Repeated and verified. MD Mendez Skill updated at once.

## 2019-08-03 NOTE — PROGRESS NOTES
END OF SHIFT NOTE:    INTAKE/OUTPUT  08/02 0701 - 08/03 0700  In: 600 [P.O.:600]  Out: -   Voiding: YES  Catheter: NO  Color: clear          DIET  DM    Flatus: Patient does have flatus present. Stool:  0 occurrences. Characteristics:       Ambulating  Yes    Emesis: 0 occurrences.     Characteristics:          VITAL SIGNS  Patient Vitals for the past 12 hrs:   Temp Pulse Resp BP SpO2   08/03/19 1628 98.6 °F (37 °C) 97 16 119/80 97 %   08/03/19 1217 98.1 °F (36.7 °C) 93 15 127/69 97 %   08/03/19 0733 98.4 °F (36.9 °C) (!) 103 16 106/62 99 %       Pain Assessment  Pain Intensity 1: 0 (08/03/19 1500)  Pain Location 1: Back  Pain Intervention(s) 1: Medication (see MAR)  Patient Stated Pain Goal: 0            Mortimer Knights, RN

## 2019-08-04 LAB
ANION GAP SERPL CALC-SCNC: 8 MMOL/L (ref 7–16)
BACTERIA SPEC CULT: ABNORMAL
BACTERIA SPEC CULT: ABNORMAL
BUN SERPL-MCNC: 8 MG/DL (ref 6–23)
CALCIUM SERPL-MCNC: 8.7 MG/DL (ref 8.3–10.4)
CHLORIDE SERPL-SCNC: 95 MMOL/L (ref 98–107)
CO2 SERPL-SCNC: 29 MMOL/L (ref 21–32)
CREAT SERPL-MCNC: 0.69 MG/DL (ref 0.8–1.5)
ERYTHROCYTE [DISTWIDTH] IN BLOOD BY AUTOMATED COUNT: 14.5 % (ref 11.9–14.6)
GLUCOSE BLD STRIP.AUTO-MCNC: 212 MG/DL (ref 65–100)
GLUCOSE BLD STRIP.AUTO-MCNC: 237 MG/DL (ref 65–100)
GLUCOSE BLD STRIP.AUTO-MCNC: 253 MG/DL (ref 65–100)
GLUCOSE BLD STRIP.AUTO-MCNC: 272 MG/DL (ref 65–100)
GLUCOSE SERPL-MCNC: 230 MG/DL (ref 65–100)
GRAM STN SPEC: ABNORMAL
HCT VFR BLD AUTO: 31.9 % (ref 41.1–50.3)
HGB BLD-MCNC: 10.3 G/DL (ref 13.6–17.2)
MCH RBC QN AUTO: 26.1 PG (ref 26.1–32.9)
MCHC RBC AUTO-ENTMCNC: 32.3 G/DL (ref 31.4–35)
MCV RBC AUTO: 81 FL (ref 79.6–97.8)
NRBC # BLD: 0 K/UL (ref 0–0.2)
PLATELET # BLD AUTO: 311 K/UL (ref 150–450)
PMV BLD AUTO: 9.5 FL (ref 9.4–12.3)
POTASSIUM SERPL-SCNC: 3.6 MMOL/L (ref 3.5–5.1)
RBC # BLD AUTO: 3.94 M/UL (ref 4.23–5.6)
SERVICE CMNT-IMP: ABNORMAL
SODIUM SERPL-SCNC: 132 MMOL/L (ref 136–145)
WBC # BLD AUTO: 8.5 K/UL (ref 4.3–11.1)

## 2019-08-04 PROCEDURE — 74011250636 HC RX REV CODE- 250/636: Performed by: INTERNAL MEDICINE

## 2019-08-04 PROCEDURE — 74011636637 HC RX REV CODE- 636/637: Performed by: INTERNAL MEDICINE

## 2019-08-04 PROCEDURE — 85027 COMPLETE CBC AUTOMATED: CPT

## 2019-08-04 PROCEDURE — 80048 BASIC METABOLIC PNL TOTAL CA: CPT

## 2019-08-04 PROCEDURE — 82962 GLUCOSE BLOOD TEST: CPT

## 2019-08-04 PROCEDURE — 74011250637 HC RX REV CODE- 250/637: Performed by: INTERNAL MEDICINE

## 2019-08-04 PROCEDURE — 36415 COLL VENOUS BLD VENIPUNCTURE: CPT

## 2019-08-04 PROCEDURE — 65270000029 HC RM PRIVATE

## 2019-08-04 PROCEDURE — 74011250637 HC RX REV CODE- 250/637: Performed by: PHYSICIAN ASSISTANT

## 2019-08-04 RX ORDER — INSULIN GLARGINE 100 [IU]/ML
65 INJECTION, SOLUTION SUBCUTANEOUS DAILY
Status: DISCONTINUED | OUTPATIENT
Start: 2019-08-05 | End: 2019-08-05

## 2019-08-04 RX ORDER — INSULIN LISPRO 100 [IU]/ML
12 INJECTION, SOLUTION INTRAVENOUS; SUBCUTANEOUS
Status: DISCONTINUED | OUTPATIENT
Start: 2019-08-04 | End: 2019-08-05

## 2019-08-04 RX ORDER — INSULIN LISPRO 100 [IU]/ML
0-10 INJECTION, SOLUTION INTRAVENOUS; SUBCUTANEOUS
Status: DISCONTINUED | OUTPATIENT
Start: 2019-08-04 | End: 2019-08-07 | Stop reason: HOSPADM

## 2019-08-04 RX ADMIN — Medication 2 G: at 00:22

## 2019-08-04 RX ADMIN — TRAMADOL HYDROCHLORIDE 50 MG: 50 TABLET, FILM COATED ORAL at 03:21

## 2019-08-04 RX ADMIN — INSULIN GLARGINE 60 UNITS: 100 INJECTION, SOLUTION SUBCUTANEOUS at 08:35

## 2019-08-04 RX ADMIN — INSULIN LISPRO 4 UNITS: 100 INJECTION, SOLUTION INTRAVENOUS; SUBCUTANEOUS at 16:54

## 2019-08-04 RX ADMIN — PRAVASTATIN SODIUM 10 MG: 20 TABLET ORAL at 21:45

## 2019-08-04 RX ADMIN — PANTOPRAZOLE SODIUM 40 MG: 40 TABLET, DELAYED RELEASE ORAL at 05:50

## 2019-08-04 RX ADMIN — POTASSIUM & SODIUM PHOSPHATES POWDER PACK 280-160-250 MG 1 PACKET: 280-160-250 PACK at 21:45

## 2019-08-04 RX ADMIN — POTASSIUM & SODIUM PHOSPHATES POWDER PACK 280-160-250 MG 1 PACKET: 280-160-250 PACK at 08:28

## 2019-08-04 RX ADMIN — ENOXAPARIN SODIUM 40 MG: 40 INJECTION SUBCUTANEOUS at 10:30

## 2019-08-04 RX ADMIN — INSULIN LISPRO 12 UNITS: 100 INJECTION, SOLUTION INTRAVENOUS; SUBCUTANEOUS at 16:54

## 2019-08-04 RX ADMIN — Medication 10 ML: at 13:24

## 2019-08-04 RX ADMIN — Medication 10 ML: at 21:46

## 2019-08-04 RX ADMIN — POTASSIUM & SODIUM PHOSPHATES POWDER PACK 280-160-250 MG 1 PACKET: 280-160-250 PACK at 12:03

## 2019-08-04 RX ADMIN — INSULIN LISPRO 6 UNITS: 100 INJECTION, SOLUTION INTRAVENOUS; SUBCUTANEOUS at 12:01

## 2019-08-04 RX ADMIN — INSULIN LISPRO 4 UNITS: 100 INJECTION, SOLUTION INTRAVENOUS; SUBCUTANEOUS at 08:00

## 2019-08-04 RX ADMIN — INSULIN LISPRO 10 UNITS: 100 INJECTION, SOLUTION INTRAVENOUS; SUBCUTANEOUS at 12:01

## 2019-08-04 RX ADMIN — INSULIN LISPRO 10 UNITS: 100 INJECTION, SOLUTION INTRAVENOUS; SUBCUTANEOUS at 08:00

## 2019-08-04 RX ADMIN — POTASSIUM & SODIUM PHOSPHATES POWDER PACK 280-160-250 MG 1 PACKET: 280-160-250 PACK at 17:00

## 2019-08-04 RX ADMIN — METOPROLOL SUCCINATE 100 MG: 100 TABLET, EXTENDED RELEASE ORAL at 08:27

## 2019-08-04 RX ADMIN — Medication 2 G: at 17:24

## 2019-08-04 RX ADMIN — Medication 2 G: at 08:27

## 2019-08-04 NOTE — PROGRESS NOTES
END OF SHIFT NOTE:    INTAKE/OUTPUT  08/03 0701 - 08/04 0700  In: 960 [P.O.:960]  Out: 200 [Urine:200]  Voiding: YES  Catheter: NO  Color: clear        DIET  DM    Flatus: Patient does have flatus present. Stool:  0 occurrences. Characteristics:       Ambulating  Yes    Emesis: 0 occurrences.     Characteristics:          VITAL SIGNS  Patient Vitals for the past 12 hrs:   Temp Pulse Resp BP SpO2   08/04/19 1612 98.4 °F (36.9 °C) 98 18 106/65 96 %   08/04/19 1130 98.5 °F (36.9 °C) 95 20 140/71 95 %   08/04/19 0747 98.2 °F (36.8 °C) (!) 104 18 134/73 97 %       Pain Assessment  Pain Intensity 1: 0 (08/04/19 1500)  Pain Location 1: Back  Pain Intervention(s) 1: Medication (see MAR)  Patient Stated Pain Goal: 0            Marice Cooks, RN

## 2019-08-04 NOTE — PROGRESS NOTES
Hospitalist Note     Admit Date:  2019  7:18 PM   Name:  Swapna Pack   Age:  44 y.o.  :  1970   MRN:  [id number]   PCP:  Yasir Gallegos MD  Treatment Team: Attending Provider: Colletta Baumgarten, MD; Care Manager: Sarah Beth Jordan, RN; Utilization Review: Fabi Mccormick RN; Consulting Provider: Francisco J Kimbrough MD; Consulting Provider: Stefany Hickey MD    Mr. Olga Arrieta is a 45 y/o M with DM who presented to ER with weakness, chills, abd pain. Met sepsis criteria for possible UTI. Found to be in DKA. Admitted to ICU on rocephin, insulin gtt and IVFs. Blood and urine cultures came back with MSSA bacteremia. Switched to ancef. CT abd ordered for pain and found cavitary lesions in lung, also seen on CT chest.  pulm and ID on board. Echo showed no vegetations, but new onset systolic HF EF 30-74%. REGGIE by cardiology on 19 showed no endocarditis. The patient would like home iv antibiotic therapy. CM arranged teaching by [facility]. Currently we are awaiting repeated BC results in order to order PICC line. Subjective:   Patient was seen at bedside. Blood cultures from  still positive. New set of [facility] SYSTEM ordered on 8/3. NTD. Blood sugar uncontrolled. Dose of lantus and pre-meal humalog increased again.      Objective:     Patient Vitals for the past 24 hrs:   Temp Pulse Resp BP SpO2   19 1130 98.5 °F (36.9 °C) 95 20 140/71 95 %   19 0747 98.2 °F (36.8 °C) (!) 104 18 134/73 97 %   19 0439 97.7 °F (36.5 °C) 98 16 113/70 90 %   19 0025 98 °F (36.7 °C) 74 16 116/70 95 %   19 2002 99.9 °F (37.7 °C) (!) 107 16 112/64 94 %   19 1628 98.6 °F (37 °C) 97 16 119/80 97 %     Oxygen Therapy  O2 Sat (%): 95 % (19 1130)  Pulse via Oximetry: 114 beats per minute (19 1010)  O2 Device: Room air (19)  O2 Flow Rate (L/min): 1 l/min (19 0950)      Intake/Output Summary (Last 24 hours) at 2019 1605  Last data filed at 8/3/2019 1811  Gross per 24 hour   Intake 480 ml   Output    Net 480 ml       *Note that automatically entered I/Os may not be accurate; dependent on patient compliance with collection and accurate  by techs. General:    Well nourished. Alert. CV:  reg. No murmur, rub, or gallop. Lungs:   CTAB. No wheezing, rhonchi, or rales. Abdomen:   Mild pain over his left upper abdomen. non-distended, no rebound, normal BS   Extremities: Warm and dry. No cyanosis or edema. Skin:     No rashes or jaundice.    Neuro:  No gross focal deficits    Data Review:  I have reviewed all labs, meds, and studies from the last 24 hours:    Recent Results (from the past 24 hour(s))   GLUCOSE, POC    Collection Time: 08/03/19  4:28 PM   Result Value Ref Range    Glucose (POC) 292 (H) 65 - 100 mg/dL   GLUCOSE, POC    Collection Time: 08/03/19  9:27 PM   Result Value Ref Range    Glucose (POC) 289 (H) 65 - 100 mg/dL   CBC W/O DIFF    Collection Time: 08/04/19  6:30 AM   Result Value Ref Range    WBC 8.5 4.3 - 11.1 K/uL    RBC 3.94 (L) 4.23 - 5.6 M/uL    HGB 10.3 (L) 13.6 - 17.2 g/dL    HCT 31.9 (L) 41.1 - 50.3 %    MCV 81.0 79.6 - 97.8 FL    MCH 26.1 26.1 - 32.9 PG    MCHC 32.3 31.4 - 35.0 g/dL    RDW 14.5 11.9 - 14.6 %    PLATELET 334 815 - 465 K/uL    MPV 9.5 9.4 - 12.3 FL    ABSOLUTE NRBC 0.00 0.0 - 0.2 K/uL   METABOLIC PANEL, BASIC    Collection Time: 08/04/19  6:30 AM   Result Value Ref Range    Sodium 132 (L) 136 - 145 mmol/L    Potassium 3.6 3.5 - 5.1 mmol/L    Chloride 95 (L) 98 - 107 mmol/L    CO2 29 21 - 32 mmol/L    Anion gap 8 7 - 16 mmol/L    Glucose 230 (H) 65 - 100 mg/dL    BUN 8 6 - 23 MG/DL    Creatinine 0.69 (L) 0.8 - 1.5 MG/DL    GFR est AA >60 >60 ml/min/1.73m2    GFR est non-AA >60 >60 ml/min/1.73m2    Calcium 8.7 8.3 - 10.4 MG/DL   GLUCOSE, POC    Collection Time: 08/04/19  7:46 AM   Result Value Ref Range    Glucose (POC) 237 (H) 65 - 100 mg/dL   GLUCOSE, POC    Collection Time: 08/04/19 10:52 AM   Result Value Ref Range Glucose (POC) 272 (H) 65 - 100 mg/dL        All Micro Results     Procedure Component Value Units Date/Time    CULTURE, BLOOD [[id number]] Collected:  08/03/19 1008    Order Status:  Completed Specimen:  Blood Updated:  08/04/19 0646     Special Requests: --        RIGHT  FOREARM       Culture result: NO GROWTH AFTER 17 HOURS       CULTURE, BLOOD [[id number]] Collected:  08/03/19 1014    Order Status:  Completed Specimen:  Blood Updated:  08/04/19 0646     Special Requests: --        LEFT  Antecubital       Culture result: NO GROWTH AFTER 17 HOURS       CULTURE, BLOOD [[id number]] Collected:  08/01/19 1043    Order Status:  Completed Specimen:  Blood Updated:  08/04/19 0646     Special Requests: --        RIGHT  FOREARM       Culture result: NO GROWTH 3 DAYS       CULTURE, BLOOD [[id number]]  (Abnormal)  (Susceptibility) Collected:  08/01/19 1044    Order Status:  Completed Specimen:  Blood Updated:  08/04/19 0618     Special Requests: --        LEFT  FOREARM       GRAM STAIN       GRAM POS COCCI IN CLUSTERS            AEROBIC BOTTLE POSITIVE               RESULTS VERIFIED, PHONED TO AND READ BACK BY Ike Virk RN @1682 8/2/19            Culture result: STAPHYLOCOCCUS AUREUS               MRSA target DNA sequences not detected: SA target DNA sequence detected within the sample. Test performed by PCR  Culture/Sensitivity to follow          CULTURE, BLOOD [[id number]]  (Abnormal) Collected:  07/30/19 1311    Order Status:  Completed Specimen:  Blood Updated:  08/01/19 0751     Special Requests: --        LEFT  FOREARM       GRAM STAIN       GRAM POS COCCI IN CLUSTERS            AEROBIC BOTTLE POSITIVE               CRITICAL RESULT NOT CALLED DUE TO PREVIOUS NOTIFICATION OF CRITICAL RESULT WITHIN THE LAST 24 HOURS.            Culture result: STAPHYLOCOCCUS AUREUS         FOR SUSCEPTIBILITY REFER TO ACCESSION [id number]    CULTURE, BLOOD [[id number]]  (Abnormal) Collected:  07/30/19 1316    Order Status:  Completed Specimen: Blood Updated:  08/01/19 0750     Special Requests: --        LEFT  ARM       GRAM STAIN       GRAM POS COCCI IN CLUSTERS            AEROBIC BOTTLE POSITIVE               RESULTS VERIFIED, PHONED TO AND READ BACK BY Canelo Suazo RN AT 8775 ON [id number] MT           Culture result: STAPHYLOCOCCUS AUREUS         FOR SUSCEPTIBILITY REFER TO ACCESSION [id number]    CULTURE, URINE [[id number]]  (Abnormal)  (Susceptibility) Collected:  07/28/19 1901    Order Status:  Completed Specimen:  Urine from Clean catch Updated:  07/31/19 0730     Special Requests: NO SPECIAL REQUESTS        Culture result:       >100,000 COLONIES/mL STAPHYLOCOCCUS AUREUS          CULTURE, BLOOD [[id number]]  (Abnormal)  (Susceptibility) Collected:  07/28/19 1945    Order Status:  Completed Specimen:  Blood Updated:  07/31/19 0642     Special Requests: --        RIGHT  JUGULAR VEIN       GRAM STAIN       GRAM POS COCCI IN CLUSTERS                  AEROBIC AND ANAEROBIC BOTTLES                  RESULTS VERIFIED, PHONED TO AND READ BACK BY ALFONSO [address] RN  [address] [place] [id number] [place]           Culture result: STAPHYLOCOCCUS AUREUS               STAPHYLOCOCCUS AUREUS DETECTED Test Performed by Multiplex PCR                  RESULTS VERIFIED, PHONED TO AND READ BACK BY  ALFONSO [address] RN AT 3563 YF [id number] MT      CULTURE, BLOOD [[id number]]  (Abnormal)  (Susceptibility) Collected:  07/28/19 2339    Order Status:  Completed Specimen:  Blood Updated:  07/31/19 0642     Special Requests: --        LEFT  HAND       GRAM STAIN       GRAM POS COCCI IN CLUSTERS            AEROBIC BOTTLE POSITIVE               CRITICAL RESULT NOT CALLED DUE TO PREVIOUS NOTIFICATION OF CRITICAL RESULT WITHIN THE LAST 24 HOURS.            Culture result: STAPHYLOCOCCUS AUREUS             Results for orders placed or performed during the hospital encounter of 07/28/19   2D ECHO COMPLETE ADULT (TTE) W OR WO CONTR    Narrative    Report creation error on 7/30/2019 12:03:58 PM    An error occurred while generating this report. The most common cause of such  errors are incomplete, duplicate, or corrupted findings. To fix this error:  return to the Outline Viewer, click the Tools button, and select \"Check  findings\". If the problem persists, contact your IS .        Current Meds:  Current Facility-Administered Medications   Medication Dose Route Frequency    [START ON 8/5/2019] insulin glargine (LANTUS) injection 65 Units  65 Units SubCUTAneous DAILY    insulin lispro (HUMALOG) injection 12 Units  12 Units SubCUTAneous TIDAC    insulin lispro (HUMALOG) injection 0-10 Units  0-10 Units SubCUTAneous TIDAC    traMADol (ULTRAM) tablet 50 mg  50 mg Oral Q6H PRN    fentaNYL citrate (PF) injection  mcg   mcg IntraVENous Multiple    midazolam (VERSED) injection 0.5-5 mg  0.5-5 mg IntraVENous Multiple    metoprolol succinate (TOPROL-XL) tablet 100 mg  100 mg Oral DAILY    pantoprazole (PROTONIX) tablet 40 mg  40 mg Oral ACB    potassium, sodium phosphates (NEUTRA-PHOS) packet 1 Packet  1 Packet Oral QID    NUTRITIONAL SUPPORT ELECTROLYTE PRN ORDERS   Does Not Apply PRN    ceFAZolin (ANCEF) 2 g/20 mL in sterile water IV syringe  2 g IntraVENous Q8H    albuterol (PROVENTIL VENTOLIN) nebulizer solution 2.5 mg  2.5 mg Nebulization Q4H PRN    brompheniramine-pseudoeph-DM (DIMETAPP) 2-30-10 mg/5 mL syrup 5 mL (Patient Supplied)  5 mL Oral QID PRN    fluticasone propionate (FLONASE) 50 mcg/actuation nasal spray 2 Spray  2 Spray Both Nostrils DAILY    pravastatin (PRAVACHOL) tablet 10 mg  10 mg Oral QHS    enoxaparin (LOVENOX) injection 40 mg  40 mg SubCUTAneous Q24H    sodium chloride (NS) flush 5-10 mL  5-10 mL IntraVENous PRN    dextrose 40% (GLUTOSE) oral gel 1 Tube  15 g Oral PRN    glucagon (GLUCAGEN) injection 1 mg  1 mg IntraMUSCular PRN    dextrose (D50W) injection syrg 12.5-25 g  25-50 mL IntraVENous PRN    sodium chloride (NS) flush 5-40 mL  5-40 mL IntraVENous Q8H    sodium chloride (NS) flush 5-40 mL  5-40 mL IntraVENous PRN    acetaminophen (TYLENOL) tablet 650 mg  650 mg Oral Q4H PRN    naloxone (NARCAN) injection 0.4 mg  0.4 mg IntraVENous PRN    diphenhydrAMINE (BENADRYL) injection 12.5 mg  12.5 mg IntraVENous Q4H PRN    ondansetron (ZOFRAN) injection 4 mg  4 mg IntraVENous Q4H PRN       Other Studies (last 24 hours):  No results found. Assessment and Plan:     Hospital Problems as of 8/4/2019 Date Reviewed: 8/2/2019          Codes Class Noted - Resolved POA    Acute septic pulmonary embolism without acute cor pulmonale (HCC) ICD-10-CM: I26.90  ICD-9-CM: 415.12  8/1/2019 - Present Unknown        Bacteremia due to methicillin susceptible Staphylococcus aureus (MSSA) ICD-10-CM: R78.81  ICD-9-CM: 790.7, 041.11  8/1/2019 - Present Unknown        Pulmonary cavitary lesion ICD-10-CM: J98.4  ICD-9-CM: 518.89  7/30/2019 - Present Yes        Acute pyelonephritis ICD-10-CM: N10  ICD-9-CM: 590.10  7/30/2019 - Present Yes        * (Principal) Sepsis due to methicillin susceptible Staphylococcus aureus ([facility] ICD-10-CM: A41.01  ICD-9-CM: 038.11, 995.91  7/29/2019 - Present Yes        DKA (diabetic ketoacidoses) ([facility] ICD-10-CM: E13.10  ICD-9-CM: 250.10  7/28/2019 - Present Yes        DM (diabetes mellitus) ([facility] (Chronic) ICD-10-CM: E11.9  ICD-9-CM: 250.00  11/18/2013 - Present Yes        HTN (hypertension) (Chronic) ICD-10-CM: I10  ICD-9-CM: 401.9  11/18/2013 - Present Yes              Plan:    -DKA: resolved  Uncontrolled DM  On lantus to 60 units and  humalog 10  units premeals   Increase lantus to 65 U at bedtime and Humalog 12 U tid   Humalog SS  DM management following    -MSSA bacteremia / UTI:   -Cavitary pulmonary lesions, possible septic emboli:  REGGIE on 8/1 showed no endocarditis   Continue ancef, total of 6 weeks   ID on board  BC from 8/1 still positive. New set of [facility] SYSTEM ordered on 8/3.  NTD     -New, systolic HF EF 55-14%  on BB, ACEI  Cardiology on board           Diet: DIET DIABETIC WITH OPTIONS  DVT ppx:  lovenox    Signed:  Bronson Cavanaugh MD

## 2019-08-05 LAB
ANION GAP SERPL CALC-SCNC: 7 MMOL/L (ref 7–16)
BUN SERPL-MCNC: 9 MG/DL (ref 6–23)
CALCIUM SERPL-MCNC: 8.7 MG/DL (ref 8.3–10.4)
CHLORIDE SERPL-SCNC: 96 MMOL/L (ref 98–107)
CO2 SERPL-SCNC: 29 MMOL/L (ref 21–32)
CREAT SERPL-MCNC: 0.75 MG/DL (ref 0.8–1.5)
ERYTHROCYTE [DISTWIDTH] IN BLOOD BY AUTOMATED COUNT: 14.6 % (ref 11.9–14.6)
GLUCOSE BLD STRIP.AUTO-MCNC: 235 MG/DL (ref 65–100)
GLUCOSE BLD STRIP.AUTO-MCNC: 263 MG/DL (ref 65–100)
GLUCOSE BLD STRIP.AUTO-MCNC: 270 MG/DL (ref 65–100)
GLUCOSE BLD STRIP.AUTO-MCNC: 312 MG/DL (ref 65–100)
GLUCOSE SERPL-MCNC: 283 MG/DL (ref 65–100)
HCT VFR BLD AUTO: 31.6 % (ref 41.1–50.3)
HGB BLD-MCNC: 10.1 G/DL (ref 13.6–17.2)
MCH RBC QN AUTO: 26.2 PG (ref 26.1–32.9)
MCHC RBC AUTO-ENTMCNC: 32 G/DL (ref 31.4–35)
MCV RBC AUTO: 81.9 FL (ref 79.6–97.8)
NRBC # BLD: 0 K/UL (ref 0–0.2)
PLATELET # BLD AUTO: 305 K/UL (ref 150–450)
PMV BLD AUTO: 9.2 FL (ref 9.4–12.3)
POTASSIUM SERPL-SCNC: 3.7 MMOL/L (ref 3.5–5.1)
RBC # BLD AUTO: 3.86 M/UL (ref 4.23–5.6)
SODIUM SERPL-SCNC: 132 MMOL/L (ref 136–145)
WBC # BLD AUTO: 8.1 K/UL (ref 4.3–11.1)

## 2019-08-05 PROCEDURE — 74011250637 HC RX REV CODE- 250/637: Performed by: PHYSICIAN ASSISTANT

## 2019-08-05 PROCEDURE — 65270000029 HC RM PRIVATE

## 2019-08-05 PROCEDURE — 36415 COLL VENOUS BLD VENIPUNCTURE: CPT

## 2019-08-05 PROCEDURE — 85027 COMPLETE CBC AUTOMATED: CPT

## 2019-08-05 PROCEDURE — 74011250636 HC RX REV CODE- 250/636: Performed by: INTERNAL MEDICINE

## 2019-08-05 PROCEDURE — 74011250637 HC RX REV CODE- 250/637: Performed by: INTERNAL MEDICINE

## 2019-08-05 PROCEDURE — 82962 GLUCOSE BLOOD TEST: CPT

## 2019-08-05 PROCEDURE — 80048 BASIC METABOLIC PNL TOTAL CA: CPT

## 2019-08-05 PROCEDURE — 74011636637 HC RX REV CODE- 636/637: Performed by: INTERNAL MEDICINE

## 2019-08-05 RX ORDER — INSULIN GLARGINE 100 [IU]/ML
70 INJECTION, SOLUTION SUBCUTANEOUS DAILY
Status: DISCONTINUED | OUTPATIENT
Start: 2019-08-06 | End: 2019-08-06

## 2019-08-05 RX ORDER — INSULIN LISPRO 100 [IU]/ML
15 INJECTION, SOLUTION INTRAVENOUS; SUBCUTANEOUS
Status: DISCONTINUED | OUTPATIENT
Start: 2019-08-05 | End: 2019-08-06

## 2019-08-05 RX ADMIN — Medication 10 ML: at 21:34

## 2019-08-05 RX ADMIN — INSULIN LISPRO 6 UNITS: 100 INJECTION, SOLUTION INTRAVENOUS; SUBCUTANEOUS at 17:10

## 2019-08-05 RX ADMIN — INSULIN LISPRO 12 UNITS: 100 INJECTION, SOLUTION INTRAVENOUS; SUBCUTANEOUS at 12:01

## 2019-08-05 RX ADMIN — TRAMADOL HYDROCHLORIDE 50 MG: 50 TABLET, FILM COATED ORAL at 00:27

## 2019-08-05 RX ADMIN — METOPROLOL SUCCINATE 100 MG: 100 TABLET, EXTENDED RELEASE ORAL at 08:36

## 2019-08-05 RX ADMIN — INSULIN LISPRO 6 UNITS: 100 INJECTION, SOLUTION INTRAVENOUS; SUBCUTANEOUS at 07:45

## 2019-08-05 RX ADMIN — Medication 10 ML: at 13:13

## 2019-08-05 RX ADMIN — INSULIN LISPRO 8 UNITS: 100 INJECTION, SOLUTION INTRAVENOUS; SUBCUTANEOUS at 12:00

## 2019-08-05 RX ADMIN — ONDANSETRON 4 MG: 2 INJECTION INTRAMUSCULAR; INTRAVENOUS at 21:39

## 2019-08-05 RX ADMIN — POTASSIUM & SODIUM PHOSPHATES POWDER PACK 280-160-250 MG 1 PACKET: 280-160-250 PACK at 17:34

## 2019-08-05 RX ADMIN — PRAVASTATIN SODIUM 10 MG: 20 TABLET ORAL at 21:34

## 2019-08-05 RX ADMIN — Medication 2 G: at 08:36

## 2019-08-05 RX ADMIN — POTASSIUM & SODIUM PHOSPHATES POWDER PACK 280-160-250 MG 1 PACKET: 280-160-250 PACK at 08:36

## 2019-08-05 RX ADMIN — Medication 2 G: at 01:53

## 2019-08-05 RX ADMIN — POTASSIUM & SODIUM PHOSPHATES POWDER PACK 280-160-250 MG 1 PACKET: 280-160-250 PACK at 12:01

## 2019-08-05 RX ADMIN — INSULIN LISPRO 15 UNITS: 100 INJECTION, SOLUTION INTRAVENOUS; SUBCUTANEOUS at 17:05

## 2019-08-05 RX ADMIN — INSULIN LISPRO 12 UNITS: 100 INJECTION, SOLUTION INTRAVENOUS; SUBCUTANEOUS at 07:45

## 2019-08-05 RX ADMIN — Medication 2 G: at 16:14

## 2019-08-05 RX ADMIN — TRAMADOL HYDROCHLORIDE 50 MG: 50 TABLET, FILM COATED ORAL at 19:29

## 2019-08-05 RX ADMIN — PANTOPRAZOLE SODIUM 40 MG: 40 TABLET, DELAYED RELEASE ORAL at 05:07

## 2019-08-05 RX ADMIN — ENOXAPARIN SODIUM 40 MG: 40 INJECTION SUBCUTANEOUS at 10:12

## 2019-08-05 RX ADMIN — POTASSIUM & SODIUM PHOSPHATES POWDER PACK 280-160-250 MG 1 PACKET: 280-160-250 PACK at 21:34

## 2019-08-05 RX ADMIN — Medication 10 ML: at 05:07

## 2019-08-05 RX ADMIN — INSULIN GLARGINE 65 UNITS: 100 INJECTION, SOLUTION SUBCUTANEOUS at 08:41

## 2019-08-05 NOTE — PROGRESS NOTES
END OF SHIFT NOTE:    INTAKE/OUTPUT  No intake/output data recorded. Voiding: YES  Catheter: NO  Color: clear      DIET  DM    Flatus: Patient does have flatus present. Stool:  3 occurrences. -per pt  Characteristics:       Ambulating  Yes    Emesis: 0 occurrences.     Characteristics:          VITAL SIGNS  Patient Vitals for the past 12 hrs:   Temp Pulse Resp BP SpO2   08/05/19 1618 98.2 °F (36.8 °C) 97 18 124/70 99 %   08/05/19 1125 98.5 °F (36.9 °C) 96 18 101/62 96 %   08/05/19 0701 98 °F (36.7 °C) 97 18 114/77 97 %       Pain Assessment  Pain Intensity 1: 0 (08/05/19 1500)  Pain Location 1: Abdomen  Pain Intervention(s) 1: Medication (see MAR)  Patient Stated Pain Goal: 0            Helene Cooley RN

## 2019-08-05 NOTE — DIABETES MGMT
Girlfriend at bedside. . Blood glucose range yesterday 212-272 with pt receiving a total 106 units of insulin (Lantus 60 units and Humalog 46 units). Reviewed current basal/bolus regimen of Lantus 65 units daily, Humalog 12 units 3x/day ac, and Humalog sliding scale coverage 4x/day ac and hs, including type of insulin, timing with meals, onset, duration of effect, and peak of insulin dose. Pt verbalizes understanding. Discussed target goals for blood glucose and HbA1c and the relationship between hyperglycemia and infection. Stressed the importance of follow up care for diabetes management with PCP. Discussed the importance of blood glucose monitoring and to bring blood glucose log book to PCP appointments to assist with medication titration. Pt would benefit from continued outpatient diabetes education. Contact information for [facility] given to pt and girlfriend. Pt and girlfriend have no further questions at this time.

## 2019-08-05 NOTE — PROGRESS NOTES
Hourly rounds performed. All needs met. Pt is resting quietly, bed is in low position, and call light is within reach. Pt complained of pain once during shift, which pt receive PRN pain med. Will continue to monitor and report to oncoming nurse.

## 2019-08-05 NOTE — PROGRESS NOTES
Nutrition  Reason for assessment: Length of stay    Assessment:   Food/Nutrition Patient History:  Admitted with DKA, resolved, MSSA bacteremia/UTI, new systolic HF. PMH remarkable for chronic back pain, DM, GERD, HTN. CDE following. Pt sleeping at RD visit, easily awakens. Girlfriend at bedside on her tablet throughout RD visit. Pt reports he is eating well, denies any nutrition concerns. Noted to have a full size package of sugar free cookies in his bed partially consumed. DIET DIABETIC WITH OPTIONS Consistent Carb 1800kcal; Regular      Anthropometrics:Height: 5' 11\" (180.3 cm),  Weight: 76.4 kg (168 lb 8 oz), Weight Source: Bed, Body mass index is 23.5 kg/m². BMI class of normal weight. Macronutrient needs: 76 kg listed weight   EER:  7351-2619 kcal/day (25-30 kcal/kg)   EPR:  61-76 grams protein/day (0.8-1 grams/kg)     Intake/Comparative Standards: Recorded meal(s): majority %. This potentially meets ~100% of kcal and ~100% of protein needs    Nutrition Diagnosis: Limited adherence to nutrition related recommendations related to diabetes self care as evidenced by pt snack selections and self report of selecting only sugar free snacks. Intervention:  Meals and snacks: Continue current diet. Nutrition education: Provided patient with verbal instruction/demonstration on evaluating total cho content of food and beverage items. Patient verbalizes good understanding of diet. Expect fair compliance. Discharge Nutrition Plan: Outpatient Diabetes Education Counseling recommended to patient- more information at [phone].      [facility],  [address], [address] [facility]

## 2019-08-05 NOTE — PROGRESS NOTES
Hospitalist Note     Admit Date:  2019  7:18 PM   Name:  Moose Li   Age:  44 y.o.  :  1970   MRN:  [id number]   PCP:  Divine Maier MD  Treatment Team: Attending Provider: Agustin Lockhart MD; Care Manager: Darci Obrien, RN; Utilization Review: Eva Stock RN; Consulting Provider: Clare Guerrero MD; Consulting Provider: Brady Gaines MD    Mr. Alfie Baeza is a 43 y/o M with DM who presented to ER with weakness, chills, abd pain. Met sepsis criteria for possible UTI. Found to be in DKA. Admitted to ICU on rocephin, insulin gtt and IVFs. Blood and urine cultures came back with MSSA bacteremia. Switched to ancef. CT abd ordered for pain and found cavitary lesions in lung ( septic emboli), also seen on CT chest.  pulm and ID  were on board. Echo showed no vegetations, but new onset systolic HF EF 95-02%. REGGIE by cardiology on 19 showed no endocarditis. The patient would like home iv antibiotic therapy. CM arranged teaching by [facility]. Currently we are awaiting repeated BC results in order to order PICC line. Subjective:   Patient was seen at bedside. Blood cultures from  still positive. New set of [facility] SYSTEM ordered on 8/3 and they have remained negative for 48 hours. ID recommended placing PICC line one cultures remain negative for 72 hours. Hopefully this can be done tomorrow. Will need 6 weeks of IV Ancef from the date of the last set of negative BC. Difficult to control DM. On high dose of insulin. Poor compliance with diet. Will need referral to dentist at discharge.      Objective:     Patient Vitals for the past 24 hrs:   Temp Pulse Resp BP SpO2   19 1618 98.2 °F (36.8 °C) 97 18 124/70 99 %   19 1125 98.5 °F (36.9 °C) 96 18 101/62 96 %   19 0701 98 °F (36.7 °C) 97 18 114/77 97 %   19 0433 98.1 °F (36.7 °C) 99 17 115/73 93 %   19 0001 98.7 °F (37.1 °C) (!) 107 18 107/61 93 %     Oxygen Therapy  O2 Sat (%): 99 %(RA) (19 1618)  Pulse via Oximetry: 114 beats per minute (08/01/19 1010)  O2 Device: Room air (08/01/19 2000)  O2 Flow Rate (L/min): 1 l/min (08/01/19 0950)    No intake or output data in the 24 hours ending 08/05/19 1943    *Note that automatically entered I/Os may not be accurate; dependent on patient compliance with collection and accurate  by techs. General:    Well nourished. Alert. CV:  reg. No murmur, rub, or gallop. Lungs:   CTAB. No wheezing, rhonchi, or rales. Abdomen:   Mild pain over his left upper abdomen. non-distended, no rebound, normal BS   Extremities: Warm and dry. No cyanosis or edema. Skin:     No rashes or jaundice.    Neuro:  No gross focal deficits    Data Review:  I have reviewed all labs, meds, and studies from the last 24 hours:    Recent Results (from the past 24 hour(s))   GLUCOSE, POC    Collection Time: 08/04/19  9:50 PM   Result Value Ref Range    Glucose (POC) 253 (H) 65 - 100 mg/dL   CBC W/O DIFF    Collection Time: 08/05/19  6:08 AM   Result Value Ref Range    WBC 8.1 4.3 - 11.1 K/uL    RBC 3.86 (L) 4.23 - 5.6 M/uL    HGB 10.1 (L) 13.6 - 17.2 g/dL    HCT 31.6 (L) 41.1 - 50.3 %    MCV 81.9 79.6 - 97.8 FL    MCH 26.2 26.1 - 32.9 PG    MCHC 32.0 31.4 - 35.0 g/dL    RDW 14.6 11.9 - 14.6 %    PLATELET 366 758 - 971 K/uL    MPV 9.2 (L) 9.4 - 12.3 FL    ABSOLUTE NRBC 0.00 0.0 - 0.2 K/uL   METABOLIC PANEL, BASIC    Collection Time: 08/05/19  6:08 AM   Result Value Ref Range    Sodium 132 (L) 136 - 145 mmol/L    Potassium 3.7 3.5 - 5.1 mmol/L    Chloride 96 (L) 98 - 107 mmol/L    CO2 29 21 - 32 mmol/L    Anion gap 7 7 - 16 mmol/L    Glucose 283 (H) 65 - 100 mg/dL    BUN 9 6 - 23 MG/DL    Creatinine 0.75 (L) 0.8 - 1.5 MG/DL    GFR est AA >60 >60 ml/min/1.73m2    GFR est non-AA >60 >60 ml/min/1.73m2    Calcium 8.7 8.3 - 10.4 MG/DL   GLUCOSE, POC    Collection Time: 08/05/19  7:00 AM   Result Value Ref Range    Glucose (POC) 270 (H) 65 - 100 mg/dL   GLUCOSE, POC    Collection Time: 08/05/19 11:24 AM   Result Value Ref Range    Glucose (POC) 312 (H) 65 - 100 mg/dL   GLUCOSE, POC    Collection Time: 08/05/19  4:22 PM   Result Value Ref Range    Glucose (POC) 263 (H) 65 - 100 mg/dL        All Micro Results     Procedure Component Value Units Date/Time    CULTURE, BLOOD [[id number]] Collected:  08/03/19 1008    Order Status:  Completed Specimen:  Blood Updated:  08/05/19 1125     Special Requests: --        RIGHT  FOREARM       Culture result: NO GROWTH 2 DAYS       CULTURE, BLOOD [[id number]] Collected:  08/03/19 1014    Order Status:  Completed Specimen:  Blood Updated:  08/05/19 1125     Special Requests: --        LEFT  Antecubital       Culture result: NO GROWTH 2 DAYS       CULTURE, BLOOD [[id number]] Collected:  08/01/19 1043    Order Status:  Completed Specimen:  Blood Updated:  08/05/19 1125     Special Requests: --        RIGHT  FOREARM       Culture result: NO GROWTH 4 DAYS       CULTURE, BLOOD [[id number]]  (Abnormal)  (Susceptibility) Collected:  08/01/19 1044    Order Status:  Completed Specimen:  Blood Updated:  08/04/19 0618     Special Requests: --        LEFT  FOREARM       GRAM STAIN       GRAM POS COCCI IN CLUSTERS            AEROBIC BOTTLE POSITIVE               RESULTS VERIFIED, PHONED TO AND READ BACK BY Isha Oscar RN @7669 8/2/19 HF           Culture result: STAPHYLOCOCCUS AUREUS               MRSA target DNA sequences not detected: SA target DNA sequence detected within the sample. Test performed by PCR  Culture/Sensitivity to follow          CULTURE, BLOOD [[id number]]  (Abnormal) Collected:  07/30/19 1311    Order Status:  Completed Specimen:  Blood Updated:  08/01/19 0751     Special Requests: --        LEFT  FOREARM       GRAM STAIN       GRAM POS COCCI IN CLUSTERS            AEROBIC BOTTLE POSITIVE               CRITICAL RESULT NOT CALLED DUE TO PREVIOUS NOTIFICATION OF CRITICAL RESULT WITHIN THE LAST 24 HOURS.            Culture result: STAPHYLOCOCCUS AUREUS         FOR SUSCEPTIBILITY REFER TO ACCESSION [id number]    CULTURE, BLOOD [[id number]]  (Abnormal) Collected:  07/30/19 1316    Order Status:  Completed Specimen:  Blood Updated:  08/01/19 0750     Special Requests: --        LEFT  ARM       GRAM STAIN       GRAM POS COCCI IN CLUSTERS            AEROBIC BOTTLE POSITIVE               RESULTS VERIFIED, PHONED TO AND READ BACK BY Loni Izquierdo RN AT 1252 ON [id number] MT           Culture result: STAPHYLOCOCCUS AUREUS         FOR SUSCEPTIBILITY REFER TO ACCESSION [id number]    CULTURE, URINE [[id number]]  (Abnormal)  (Susceptibility) Collected:  07/28/19 1901    Order Status:  Completed Specimen:  Urine from Clean catch Updated:  07/31/19 0730     Special Requests: NO SPECIAL REQUESTS        Culture result:       >100,000 COLONIES/mL STAPHYLOCOCCUS AUREUS          CULTURE, BLOOD [[id number]]  (Abnormal)  (Susceptibility) Collected:  07/28/19 1945    Order Status:  Completed Specimen:  Blood Updated:  07/31/19 0642     Special Requests: --        RIGHT  JUGULAR VEIN       GRAM STAIN       GRAM POS COCCI IN CLUSTERS                  AEROBIC AND ANAEROBIC BOTTLES                  RESULTS VERIFIED, PHONED TO AND READ BACK BY ALFONSO [address] RN AT [facility] [address] [place] [id number] MT           Culture result: STAPHYLOCOCCUS AUREUS               STAPHYLOCOCCUS AUREUS DETECTED Test Performed by Multiplex PCR                  RESULTS VERIFIED, PHONED TO AND READ BACK BY  ALFONSO [address] RN AT 3653 DF [id number] MT      CULTURE, BLOOD [[id number]]  (Abnormal)  (Susceptibility) Collected:  07/28/19 2339    Order Status:  Completed Specimen:  Blood Updated:  07/31/19 0642     Special Requests: --        LEFT  HAND       GRAM STAIN       GRAM POS COCCI IN CLUSTERS            AEROBIC BOTTLE POSITIVE               CRITICAL RESULT NOT CALLED DUE TO PREVIOUS NOTIFICATION OF CRITICAL RESULT WITHIN THE LAST 24 HOURS.            Culture result: STAPHYLOCOCCUS AUREUS             Results for orders placed or performed during the hospital encounter of 07/28/19   2D ECHO COMPLETE ADULT (TTE) W OR WO CONTR    Narrative    Report creation error on 7/30/2019 12:03:58 PM    An error occurred while generating this report. The most common cause of such  errors are incomplete, duplicate, or corrupted findings. To fix this error:  return to the Outline Viewer, click the Tools button, and select \"Check  findings\". If the problem persists, contact your IS .        Current Meds:  Current Facility-Administered Medications   Medication Dose Route Frequency    insulin lispro (HUMALOG) injection 15 Units  15 Units SubCUTAneous TIDAC    [START ON 8/6/2019] insulin glargine (LANTUS) injection 70 Units  70 Units SubCUTAneous DAILY    insulin lispro (HUMALOG) injection 0-10 Units  0-10 Units SubCUTAneous TIDAC    traMADol (ULTRAM) tablet 50 mg  50 mg Oral Q6H PRN    fentaNYL citrate (PF) injection  mcg   mcg IntraVENous Multiple    midazolam (VERSED) injection 0.5-5 mg  0.5-5 mg IntraVENous Multiple    metoprolol succinate (TOPROL-XL) tablet 100 mg  100 mg Oral DAILY    pantoprazole (PROTONIX) tablet 40 mg  40 mg Oral ACB    potassium, sodium phosphates (NEUTRA-PHOS) packet 1 Packet  1 Packet Oral QID    NUTRITIONAL SUPPORT ELECTROLYTE PRN ORDERS   Does Not Apply PRN    ceFAZolin (ANCEF) 2 g/20 mL in sterile water IV syringe  2 g IntraVENous Q8H    albuterol (PROVENTIL VENTOLIN) nebulizer solution 2.5 mg  2.5 mg Nebulization Q4H PRN    brompheniramine-pseudoeph-DM (DIMETAPP) 2-30-10 mg/5 mL syrup 5 mL (Patient Supplied)  5 mL Oral QID PRN    fluticasone propionate (FLONASE) 50 mcg/actuation nasal spray 2 Spray  2 Spray Both Nostrils DAILY    pravastatin (PRAVACHOL) tablet 10 mg  10 mg Oral QHS    enoxaparin (LOVENOX) injection 40 mg  40 mg SubCUTAneous Q24H    sodium chloride (NS) flush 5-10 mL  5-10 mL IntraVENous PRN    dextrose 40% (GLUTOSE) oral gel 1 Tube  15 g Oral PRN    glucagon (GLUCAGEN) injection 1 mg  1 mg IntraMUSCular PRN    dextrose (D50W) injection syrg 12.5-25 g  25-50 mL IntraVENous PRN    sodium chloride (NS) flush 5-40 mL  5-40 mL IntraVENous Q8H    sodium chloride (NS) flush 5-40 mL  5-40 mL IntraVENous PRN    acetaminophen (TYLENOL) tablet 650 mg  650 mg Oral Q4H PRN    naloxone (NARCAN) injection 0.4 mg  0.4 mg IntraVENous PRN    diphenhydrAMINE (BENADRYL) injection 12.5 mg  12.5 mg IntraVENous Q4H PRN    ondansetron (ZOFRAN) injection 4 mg  4 mg IntraVENous Q4H PRN       Other Studies (last 24 hours):  No results found.     Assessment and Plan:     Hospital Problems as of 8/5/2019 Date Reviewed: 8/2/2019          Codes Class Noted - Resolved POA    Acute septic pulmonary embolism without acute cor pulmonale (HCC) ICD-10-CM: I26.90  ICD-9-CM: 415.12  8/1/2019 - Present Unknown        Bacteremia due to methicillin susceptible Staphylococcus aureus (MSSA) ICD-10-CM: R78.81  ICD-9-CM: 790.7, 041.11  8/1/2019 - Present Unknown        Pulmonary cavitary lesion ICD-10-CM: J98.4  ICD-9-CM: 518.89  7/30/2019 - Present Yes        Acute pyelonephritis ICD-10-CM: N10  ICD-9-CM: 590.10  7/30/2019 - Present Yes        * (Principal) Sepsis due to methicillin susceptible Staphylococcus aureus ([facility] ICD-10-CM: A41.01  ICD-9-CM: 038.11, 995.91  7/29/2019 - Present Yes        DKA (diabetic ketoacidoses) ([facility] ICD-10-CM: E13.10  ICD-9-CM: 250.10  7/28/2019 - Present Yes        DM (diabetes mellitus) ([facility] (Chronic) ICD-10-CM: E11.9  ICD-9-CM: 250.00  11/18/2013 - Present Yes        HTN (hypertension) (Chronic) ICD-10-CM: I10  ICD-9-CM: 401.9  11/18/2013 - Present Yes              Plan:    -DKA: resolved  Uncontrolled DM  On lantus to 70 units and  humalog 15   units premeals ( dose increased today)   Humalog SS  DM management following    -MSSA bacteremia / UTI:   -Cavitary pulmonary lesions, possible septic emboli  REGGIE on 8/1 showed no endocarditis   Continue ancef, total of 6 weeks FROM THE DATE OF negative blood cultures   ID on board  BC from 8/1 still positive. New set of [facility] SYSTEM ordered on 8/3 and they are negative TD. If BC remain negative for 72 H ( tomorrow). PICC line can be inserted and patient discharged home with home antibiotics. ID did not see him today.  Consider contacting them tomorrow if it looks like pt can be d/c     -New, systolic HF EF 89-91%  on BB, ACEI  Cardiology recs appreciated           Diet:  DIET DIABETIC WITH OPTIONS  DVT ppx:  lovenox    Signed:  Joretta Lesches, MD

## 2019-08-06 PROBLEM — I50.22 SYSTOLIC CHF, CHRONIC (HCC): Status: ACTIVE | Noted: 2019-08-06

## 2019-08-06 LAB
ANION GAP SERPL CALC-SCNC: 5 MMOL/L (ref 7–16)
BACTERIA SPEC CULT: NORMAL
BUN SERPL-MCNC: 11 MG/DL (ref 6–23)
CALCIUM SERPL-MCNC: 9.3 MG/DL (ref 8.3–10.4)
CHLORIDE SERPL-SCNC: 94 MMOL/L (ref 98–107)
CO2 SERPL-SCNC: 32 MMOL/L (ref 21–32)
CREAT SERPL-MCNC: 0.89 MG/DL (ref 0.8–1.5)
GLUCOSE BLD STRIP.AUTO-MCNC: 128 MG/DL (ref 65–100)
GLUCOSE BLD STRIP.AUTO-MCNC: 203 MG/DL (ref 65–100)
GLUCOSE BLD STRIP.AUTO-MCNC: 214 MG/DL (ref 65–100)
GLUCOSE BLD STRIP.AUTO-MCNC: 261 MG/DL (ref 65–100)
GLUCOSE BLD STRIP.AUTO-MCNC: 333 MG/DL (ref 65–100)
GLUCOSE SERPL-MCNC: 327 MG/DL (ref 65–100)
INR PPP: 1.1
POTASSIUM SERPL-SCNC: 4.5 MMOL/L (ref 3.5–5.1)
PROTHROMBIN TIME: 13.8 SEC (ref 11.7–14.5)
SERVICE CMNT-IMP: NORMAL
SODIUM SERPL-SCNC: 131 MMOL/L (ref 136–145)

## 2019-08-06 PROCEDURE — 74011250637 HC RX REV CODE- 250/637: Performed by: INTERNAL MEDICINE

## 2019-08-06 PROCEDURE — 74011636637 HC RX REV CODE- 636/637: Performed by: INTERNAL MEDICINE

## 2019-08-06 PROCEDURE — 85610 PROTHROMBIN TIME: CPT

## 2019-08-06 PROCEDURE — 80048 BASIC METABOLIC PNL TOTAL CA: CPT

## 2019-08-06 PROCEDURE — 36415 COLL VENOUS BLD VENIPUNCTURE: CPT

## 2019-08-06 PROCEDURE — 74011250636 HC RX REV CODE- 250/636: Performed by: INTERNAL MEDICINE

## 2019-08-06 PROCEDURE — 65270000029 HC RM PRIVATE

## 2019-08-06 PROCEDURE — 36573 INSJ PICC RS&I 5 YR+: CPT | Performed by: NURSE PRACTITIONER

## 2019-08-06 PROCEDURE — 74011250637 HC RX REV CODE- 250/637: Performed by: PHYSICIAN ASSISTANT

## 2019-08-06 PROCEDURE — C1751 CATH, INF, PER/CENT/MIDLINE: HCPCS

## 2019-08-06 PROCEDURE — 82962 GLUCOSE BLOOD TEST: CPT

## 2019-08-06 RX ORDER — SODIUM CHLORIDE 0.9 % (FLUSH) 0.9 %
10 SYRINGE (ML) INJECTION EVERY 8 HOURS
Status: DISCONTINUED | OUTPATIENT
Start: 2019-08-06 | End: 2019-08-07 | Stop reason: HOSPADM

## 2019-08-06 RX ORDER — HEPARIN 100 UNIT/ML
300 SYRINGE INTRAVENOUS AS NEEDED
Status: DISCONTINUED | OUTPATIENT
Start: 2019-08-06 | End: 2019-08-07 | Stop reason: HOSPADM

## 2019-08-06 RX ORDER — HEPARIN 100 UNIT/ML
300 SYRINGE INTRAVENOUS EVERY 8 HOURS
Status: DISCONTINUED | OUTPATIENT
Start: 2019-08-06 | End: 2019-08-07 | Stop reason: HOSPADM

## 2019-08-06 RX ORDER — INSULIN GLARGINE 100 [IU]/ML
75 INJECTION, SOLUTION SUBCUTANEOUS DAILY
Status: DISCONTINUED | OUTPATIENT
Start: 2019-08-07 | End: 2019-08-07

## 2019-08-06 RX ORDER — INSULIN LISPRO 100 [IU]/ML
18 INJECTION, SOLUTION INTRAVENOUS; SUBCUTANEOUS
Status: DISCONTINUED | OUTPATIENT
Start: 2019-08-07 | End: 2019-08-07 | Stop reason: HOSPADM

## 2019-08-06 RX ORDER — SODIUM CHLORIDE 0.9 % (FLUSH) 0.9 %
10 SYRINGE (ML) INJECTION AS NEEDED
Status: DISCONTINUED | OUTPATIENT
Start: 2019-08-06 | End: 2019-08-07 | Stop reason: HOSPADM

## 2019-08-06 RX ADMIN — INSULIN LISPRO 15 UNITS: 100 INJECTION, SOLUTION INTRAVENOUS; SUBCUTANEOUS at 11:57

## 2019-08-06 RX ADMIN — METOPROLOL SUCCINATE 100 MG: 100 TABLET, EXTENDED RELEASE ORAL at 08:46

## 2019-08-06 RX ADMIN — Medication 10 ML: at 16:26

## 2019-08-06 RX ADMIN — Medication 2 G: at 01:31

## 2019-08-06 RX ADMIN — INSULIN GLARGINE 70 UNITS: 100 INJECTION, SOLUTION SUBCUTANEOUS at 08:44

## 2019-08-06 RX ADMIN — INSULIN LISPRO 8 UNITS: 100 INJECTION, SOLUTION INTRAVENOUS; SUBCUTANEOUS at 08:45

## 2019-08-06 RX ADMIN — PRAVASTATIN SODIUM 10 MG: 20 TABLET ORAL at 22:00

## 2019-08-06 RX ADMIN — INSULIN LISPRO 15 UNITS: 100 INJECTION, SOLUTION INTRAVENOUS; SUBCUTANEOUS at 17:13

## 2019-08-06 RX ADMIN — Medication 10 ML: at 22:09

## 2019-08-06 RX ADMIN — INSULIN LISPRO 4 UNITS: 100 INJECTION, SOLUTION INTRAVENOUS; SUBCUTANEOUS at 17:11

## 2019-08-06 RX ADMIN — ACETAMINOPHEN 650 MG: 325 TABLET, FILM COATED ORAL at 22:00

## 2019-08-06 RX ADMIN — Medication 2 G: at 18:09

## 2019-08-06 RX ADMIN — Medication 10 ML: at 16:27

## 2019-08-06 RX ADMIN — Medication 10 ML: at 05:14

## 2019-08-06 RX ADMIN — Medication 2 G: at 08:47

## 2019-08-06 RX ADMIN — ENOXAPARIN SODIUM 40 MG: 40 INJECTION SUBCUTANEOUS at 09:00

## 2019-08-06 RX ADMIN — SODIUM CHLORIDE, PRESERVATIVE FREE 300 UNITS: 5 INJECTION INTRAVENOUS at 16:26

## 2019-08-06 RX ADMIN — SODIUM CHLORIDE, PRESERVATIVE FREE 300 UNITS: 5 INJECTION INTRAVENOUS at 22:01

## 2019-08-06 RX ADMIN — PANTOPRAZOLE SODIUM 40 MG: 40 TABLET, DELAYED RELEASE ORAL at 05:14

## 2019-08-06 RX ADMIN — INSULIN LISPRO 6 UNITS: 100 INJECTION, SOLUTION INTRAVENOUS; SUBCUTANEOUS at 11:56

## 2019-08-06 RX ADMIN — INSULIN LISPRO 15 UNITS: 100 INJECTION, SOLUTION INTRAVENOUS; SUBCUTANEOUS at 08:45

## 2019-08-06 RX ADMIN — POTASSIUM & SODIUM PHOSPHATES POWDER PACK 280-160-250 MG 1 PACKET: 280-160-250 PACK at 12:01

## 2019-08-06 RX ADMIN — TRAMADOL HYDROCHLORIDE 50 MG: 50 TABLET, FILM COATED ORAL at 01:42

## 2019-08-06 RX ADMIN — POTASSIUM & SODIUM PHOSPHATES POWDER PACK 280-160-250 MG 1 PACKET: 280-160-250 PACK at 08:46

## 2019-08-06 RX ADMIN — Medication 10 ML: at 22:01

## 2019-08-06 NOTE — DIABETES MGMT
Patient's blood glucose ranged 235-312 yesterday with patient receiving Lantus 65 units and Humalog 59 units. Blood glucose 333 this morning. Current regimen: Lantus 70 units daily, Humalog 15 units with meals, and Humalog SSI. Patient likely needs titration in basal and bolus insulins to improve glycemic control.

## 2019-08-06 NOTE — PROGRESS NOTES
PICC Placement Note    PRE-PROCEDURE VERIFICATION  Correct Procedure: yes. Time out completed with assistant Clayton Zayas rn and all persons present in agreement with time out. Correct Site:  yes  Temperature: Temp: 99.4 °F (37.4 °C), Temperature Source: Temp Source: Oral  Recent Labs     08/06/19  0633 08/05/19  0608   BUN 11 9   CREA 0.89 0.75*   PLT  --  305   WBC  --  8.1     Allergies: Influenza virus vaccine, specific  Education materials for PICC Care given to patient or family. PROCEDURE DETAIL  A single lumen PICC line was started for antibiotic therapy. The following documentation is in addition to the PICC properties in the lines/airways flowsheet :  Lot #: [id number]  xylocaine used: yes  Mid-Arm Circumference: 27 (cm)  Internal Catheter Length: 38 (cm)  Internal Catheter Total Length: 38 (cm)  Vein Selection for PICC:right brachial  Central Line Bundle followed yes  Complication Related to Insertion: had to stick 3 times due to pt veins collapsing on guidewire. Both the insertion guidewire and ECG guidewire were removed intact all ports have positive blood return and were flush well with normal saline. The location of the tip of the PICC is verified using ECG technology. The tip is in the SVC per ECG reading. See image below.              Line is okay to use: yes

## 2019-08-06 NOTE — PROGRESS NOTES
Hourly rounds performed. All needs met. Pt complained of pain during shift. Pt received PRN pain med twice during shift. Pt ha sno complaints at this time. Bed is in low position and call light is within reach. Will continue to monitor and report to oncoming nurse.

## 2019-08-06 NOTE — PROGRESS NOTES
Hospitalist Progress Note     Admit Date:  2019  7:18 PM   Name:  Roosevelt Erickson   Age:  44 y.o.  :  1970   MRN:  [id number]   PCP:  Yasir Fishman MD  Treatment Team: Attending Provider: Mark Suresh MD; Care Manager: David Perez, RN; Utilization Review: Claudene Pebbles, SAM; Consulting Provider: Adele Apple MD; Consulting Provider: Ilir Wallace MD    Subjective:   HPI and or CC:  Cc: left pyelonephritis and septic pulm emboli  MSSA sepsis with above. Repeat blood cultures from 8/3 ngtd and possible picc line today in preparation for discharge on home IV antibx. ID follow up pnd. REGGIE negative for SBE. From Prior provider notes:     Mr. Vale Farley is a 43 y/o M with DM who presented to ER with weakness, chills, abd pain. Met sepsis criteria for possible UTI. Found to be in DKA. Admitted to ICU on rocephin, insulin gtt and IVFs. Blood and urine cultures came back with MSSA bacteremia. Switched to ancef. CT abd ordered for pain and found cavitary lesions in lung ( septic emboli), also seen on CT chest.  pulm and ID  were on board. Echo showed no vegetations, but new onset systolic HF EF 11-61%. REGGIE by cardiology on 19 showed no endocarditis. The patient would like home iv antibiotic therapy. CM arranged teaching by [facility]. Currently we are awaiting repeated BC results in order to order PICC line.          Objective:     Patient Vitals for the past 24 hrs:   Temp Pulse Resp BP SpO2   19 1316 99.4 °F (37.4 °C) (!) 104 18 114/89 96 %   19 0900 99 °F (37.2 °C) 100 18 128/76 97 %   19 0516 98.3 °F (36.8 °C) (!) 104 16 129/85 95 %   19 2314 98.7 °F (37.1 °C) (!) 105 16 121/71 97 %   19 2013 98.9 °F (37.2 °C) (!) 103 18 124/72 96 %   19 1618 98.2 °F (36.8 °C) 97 18 124/70 99 %     Oxygen Therapy  O2 Sat (%): 96 % (19 1316)  Pulse via Oximetry: 114 beats per minute (19 1010)  O2 Device: Room air (19)  O2 Flow Rate (L/min): 1 l/min (08/01/19 0950)    Intake/Output Summary (Last 24 hours) at 8/6/2019 1544  Last data filed at 8/6/2019 0900  Gross per 24 hour   Intake 360 ml   Output    Net 360 ml         REVIEW OF SYSTEMS: Comprehensive ROS performed and negative except as stated in HPI. Physical Examination:  General:    No gross distress  Head:  Normocephalic, atraumatic, nares patent  Eyes:  Extraocular movements intact, normal sclera  CV:   RRR. No  Murmurs, clicks, or gallops, distal pulses intact  Lungs:   Unlabored, no cyanosis, no wheeze  Abdomen:   Soft, nondistended, nontender today   Extremities: No gross rash, no acral cyanosis. Skin:     No rashes or jaundice. Neuro:  No gross focal deficits, no tremor  Psych:  Mood and affect appropriate    Data Review:  I have reviewed all labs, meds, telemetry events, and studies from the last 24 hours.     Recent Results (from the past 24 hour(s))   GLUCOSE, POC    Collection Time: 08/05/19  4:22 PM   Result Value Ref Range    Glucose (POC) 263 (H) 65 - 100 mg/dL   GLUCOSE, POC    Collection Time: 08/05/19  9:04 PM   Result Value Ref Range    Glucose (POC) 235 (H) 65 - 541 mg/dL   METABOLIC PANEL, BASIC    Collection Time: 08/06/19  6:33 AM   Result Value Ref Range    Sodium 131 (L) 136 - 145 mmol/L    Potassium 4.5 3.5 - 5.1 mmol/L    Chloride 94 (L) 98 - 107 mmol/L    CO2 32 21 - 32 mmol/L    Anion gap 5 (L) 7 - 16 mmol/L    Glucose 327 (H) 65 - 100 mg/dL    BUN 11 6 - 23 MG/DL    Creatinine 0.89 0.8 - 1.5 MG/DL    GFR est AA >60 >60 ml/min/1.73m2    GFR est non-AA >60 >60 ml/min/1.73m2    Calcium 9.3 8.3 - 10.4 MG/DL   GLUCOSE, POC    Collection Time: 08/06/19  7:19 AM   Result Value Ref Range    Glucose (POC) 333 (H) 65 - 100 mg/dL   GLUCOSE, POC    Collection Time: 08/06/19 11:30 AM   Result Value Ref Range    Glucose (POC) 261 (H) 65 - 100 mg/dL   GLUCOSE, POC    Collection Time: 08/06/19  3:16 PM   Result Value Ref Range    Glucose (POC) 214 (H) 65 - 100 mg/dL All Micro Results     Procedure Component Value Units Date/Time    CULTURE, BLOOD [[id number]] Collected:  08/03/19 1008    Order Status:  Completed Specimen:  Blood Updated:  08/06/19 0947     Special Requests: --        RIGHT  FOREARM       Culture result: NO GROWTH 3 DAYS       CULTURE, BLOOD [[id number]] Collected:  08/03/19 1014    Order Status:  Completed Specimen:  Blood Updated:  08/06/19 0947     Special Requests: --        LEFT  Antecubital       Culture result: NO GROWTH 3 DAYS       CULTURE, BLOOD [[id number]] Collected:  08/01/19 1043    Order Status:  Completed Specimen:  Blood Updated:  08/06/19 0946     Special Requests: --        RIGHT  FOREARM       Culture result: NO GROWTH 5 DAYS       CULTURE, BLOOD [[id number]]  (Abnormal)  (Susceptibility) Collected:  08/01/19 1044    Order Status:  Completed Specimen:  Blood Updated:  08/04/19 0618     Special Requests: --        LEFT  FOREARM       GRAM STAIN       GRAM POS COCCI IN CLUSTERS            AEROBIC BOTTLE POSITIVE               RESULTS VERIFIED, PHONED TO AND READ BACK BY Ike Virk RN @4405 8/2/19            Culture result: STAPHYLOCOCCUS AUREUS               MRSA target DNA sequences not detected: SA target DNA sequence detected within the sample. Test performed by PCR  Culture/Sensitivity to follow          CULTURE, BLOOD [[id number]]  (Abnormal) Collected:  07/30/19 1311    Order Status:  Completed Specimen:  Blood Updated:  08/01/19 0751     Special Requests: --        LEFT  FOREARM       GRAM STAIN       GRAM POS COCCI IN CLUSTERS            AEROBIC BOTTLE POSITIVE               CRITICAL RESULT NOT CALLED DUE TO PREVIOUS NOTIFICATION OF CRITICAL RESULT WITHIN THE LAST 24 HOURS.            Culture result: STAPHYLOCOCCUS AUREUS         FOR SUSCEPTIBILITY REFER TO ACCESSION [id number]    CULTURE, BLOOD [[id number]]  (Abnormal) Collected:  07/30/19 1316    Order Status:  Completed Specimen:  Blood Updated:  08/01/19 0750     Special Requests: -- LEFT  ARM       GRAM STAIN       GRAM POS COCCI IN CLUSTERS            AEROBIC BOTTLE POSITIVE               RESULTS VERIFIED, PHONED TO AND READ BACK BY Ko Simmons RN AT 0511 ON 694113 MT           Culture result: STAPHYLOCOCCUS AUREUS         FOR SUSCEPTIBILITY REFER TO ACCESSION [id number]    CULTURE, URINE [[id number]]  (Abnormal)  (Susceptibility) Collected:  07/28/19 1901    Order Status:  Completed Specimen:  Urine from Clean catch Updated:  07/31/19 0730     Special Requests: NO SPECIAL REQUESTS        Culture result:       >100,000 COLONIES/mL STAPHYLOCOCCUS AUREUS          CULTURE, BLOOD [[id number]]  (Abnormal)  (Susceptibility) Collected:  07/28/19 1945    Order Status:  Completed Specimen:  Blood Updated:  07/31/19 0642     Special Requests: --        RIGHT  JUGULAR VEIN       GRAM STAIN       GRAM POS COCCI IN CLUSTERS                  AEROBIC AND ANAEROBIC BOTTLES                  RESULTS VERIFIED, PHONED TO AND READ BACK BY ALFONSO [address] RN  [facility] [address] [place] [address] [place]           Culture result: STAPHYLOCOCCUS AUREUS               STAPHYLOCOCCUS AUREUS DETECTED Test Performed by Multiplex PCR                  RESULTS VERIFIED, PHONED TO AND READ BACK BY  ALFONSO [address] RN AT 5624 LO 078130 MT      CULTURE, BLOOD [[id number]]  (Abnormal)  (Susceptibility) Collected:  07/28/19 2339    Order Status:  Completed Specimen:  Blood Updated:  07/31/19 0642     Special Requests: --        LEFT  HAND       GRAM STAIN       GRAM POS COCCI IN CLUSTERS            AEROBIC BOTTLE POSITIVE               CRITICAL RESULT NOT CALLED DUE TO PREVIOUS NOTIFICATION OF CRITICAL RESULT WITHIN THE LAST 24 HOURS.            Culture result: STAPHYLOCOCCUS AUREUS             Current Meds:  Current Facility-Administered Medications   Medication Dose Route Frequency    sodium chloride (NS) flush 10 mL  10 mL InterCATHeter Q8H    heparin (porcine) pf 300 Units  300 Units InterCATHeter Q8H    sodium chloride (NS) flush 10 mL  10 mL InterCATHeter PRN    heparin (porcine) pf 300 Units  300 Units InterCATHeter PRN    insulin lispro (HUMALOG) injection 15 Units  15 Units SubCUTAneous TIDAC    insulin glargine (LANTUS) injection 70 Units  70 Units SubCUTAneous DAILY    insulin lispro (HUMALOG) injection 0-10 Units  0-10 Units SubCUTAneous TIDAC    traMADol (ULTRAM) tablet 50 mg  50 mg Oral Q6H PRN    fentaNYL citrate (PF) injection  mcg   mcg IntraVENous Multiple    midazolam (VERSED) injection 0.5-5 mg  0.5-5 mg IntraVENous Multiple    metoprolol succinate (TOPROL-XL) tablet 100 mg  100 mg Oral DAILY    pantoprazole (PROTONIX) tablet 40 mg  40 mg Oral ACB    potassium, sodium phosphates (NEUTRA-PHOS) packet 1 Packet  1 Packet Oral QID    NUTRITIONAL SUPPORT ELECTROLYTE PRN ORDERS   Does Not Apply PRN    ceFAZolin (ANCEF) 2 g/20 mL in sterile water IV syringe  2 g IntraVENous Q8H    albuterol (PROVENTIL VENTOLIN) nebulizer solution 2.5 mg  2.5 mg Nebulization Q4H PRN    brompheniramine-pseudoeph-DM (DIMETAPP) 2-30-10 mg/5 mL syrup 5 mL (Patient Supplied)  5 mL Oral QID PRN    fluticasone propionate (FLONASE) 50 mcg/actuation nasal spray 2 Spray  2 Spray Both Nostrils DAILY    pravastatin (PRAVACHOL) tablet 10 mg  10 mg Oral QHS    enoxaparin (LOVENOX) injection 40 mg  40 mg SubCUTAneous Q24H    sodium chloride (NS) flush 5-10 mL  5-10 mL IntraVENous PRN    dextrose 40% (GLUTOSE) oral gel 1 Tube  15 g Oral PRN    glucagon (GLUCAGEN) injection 1 mg  1 mg IntraMUSCular PRN    dextrose (D50W) injection syrg 12.5-25 g  25-50 mL IntraVENous PRN    sodium chloride (NS) flush 5-40 mL  5-40 mL IntraVENous Q8H    sodium chloride (NS) flush 5-40 mL  5-40 mL IntraVENous PRN    acetaminophen (TYLENOL) tablet 650 mg  650 mg Oral Q4H PRN    naloxone (NARCAN) injection 0.4 mg  0.4 mg IntraVENous PRN    diphenhydrAMINE (BENADRYL) injection 12.5 mg  12.5 mg IntraVENous Q4H PRN    ondansetron (ZOFRAN) injection 4 mg  4 mg IntraVENous Q4H PRN       Diet:  DIET DIABETIC WITH OPTIONS    Other Studies (last 24 hours):  No results found. Assessment and Plan:     Hospital Problems as of 8/6/2019 Date Reviewed: 8/2/2019          Codes Class Noted - Resolved POA    Systolic CHF, chronic (HCC) ICD-10-CM: I50.22  ICD-9-CM: 428.22, 428.0  8/6/2019 - Present Unknown        Acute septic pulmonary embolism without acute cor pulmonale (HCC) ICD-10-CM: I26.90  ICD-9-CM: 415.12  8/1/2019 - Present Unknown        Bacteremia due to methicillin susceptible Staphylococcus aureus (MSSA) ICD-10-CM: R78.81  ICD-9-CM: 790.7, 041.11  8/1/2019 - Present Unknown        Pulmonary cavitary lesion ICD-10-CM: J98.4  ICD-9-CM: 518.89  7/30/2019 - Present Yes        Acute pyelonephritis ICD-10-CM: N10  ICD-9-CM: 590.10  7/30/2019 - Present Yes        * (Principal) Sepsis due to methicillin susceptible Staphylococcus aureus ([facility] ICD-10-CM: A41.01  ICD-9-CM: 038.11, 995.91  7/29/2019 - Present Yes        DKA (diabetic ketoacidoses) ([facility] ICD-10-CM: E13.10  ICD-9-CM: 250.10  7/28/2019 - Present Yes        DM (diabetes mellitus) ([facility] (Chronic) ICD-10-CM: E11.9  ICD-9-CM: 250.00  11/18/2013 - Present Yes        HTN (hypertension) (Chronic) ICD-10-CM: I10  ICD-9-CM: 401.9  11/18/2013 - Present Yes              A/P:       MSSA bacteremia sepsis/pyelonephritis/septic emboli  copied from ID:  · Place PICC today   ·    ·  ID OPAT Orders:  · Ancef 2 gm IV Q 8 hrs X 6 weeks with EOT 9/16/19  · Routine PICC care  · Q Monday CBC with diff, Scr, LFT's  · Please fax results to [phone]  · ID office follow-up is scheduled for 3 & 6 weeks, TBA    dka   Unc. Dm   On lantus to 70 units and  humalog 15   units premeals ( dose increased today)   Humalog SS    New dx of systolic chf levf 97-58%    Cardiology recommendations noted , bb, ace inh.     Notify chf clinical coordinator      DC planning/Dispo: home with home iv 8/7/19 likely    DVT ppx:  lovenox    Code status:full  Medical decision maker:self spouse info on file      Signed:  Rufino Arguelles. Jett BESS

## 2019-08-06 NOTE — PROGRESS NOTES
Infectious Disease Note    Today's Date: 2019   Admit Date: 2019    Impression:   · MSSA bacteremia (, ,) with pulmonary emboli and L pyelonephritis. No source identified. REGGIE NG. Repeat BC (8/3) NG  · Uncontrolled DMII    Plan:   · Continue Ancef X 6 weeks with EOT 19   · Place PICC today   ·   ·  ID OPAT Orders:  · Ancef 2 gm IV Q 8 hrs X 6 weeks with EOT 19  · Routine PICC care  · Q Monday CBC with diff, Scr, LFT's  · Please fax results to [phone]  · ID office follow-up is scheduled for 3 & 6 weeks, TBA    Anti-infectives:   · Ancef (-      Subjective:   Afeb. . States he is feeling well. Wife in room. Re-iterated DM control  Allergies   Allergen Reactions    Influenza Virus Vaccine, Specific Nausea and Vomiting        Review of Systems:  A comprehensive review of systems was negative except for that written in the History of Present Illness. Objective:     Visit Vitals  /76 (BP 1 Location: Left arm, BP Patient Position: At rest)   Pulse 100   Temp 99 °F (37.2 °C)   Resp 18   Ht 5' 11\" (1.803 m)   Wt 73.5 kg (162 lb 1.6 oz)   SpO2 97%   BMI 22.61 kg/m²     Temp (24hrs), Av.6 °F (37 °C), Min:98.2 °F (36.8 °C), Max:99 °F (37.2 °C)       Lines:  Peripheral IV:       Physical Exam:    General:  Alert, cooperative, appears stated age   Eyes:  Sclera anicteric. Pupils equally round and reactive to light. Mouth/Throat: Mucous membranes normal, oral pharynx clear   Neck: Supple   Lungs:   Clear to auscultation bilaterally, good effort   CV:  Regular rate and rhythm,no murmur, click, rub or gallop   Abdomen:   Soft, non-tender.  bowel sounds normal. non-distended. + L CVA tenderness    Extremities: No cyanosis or edema   Skin: Skin color, texture, turgor normal. no acute rash or lesions   Lymph nodes: Cervical and supraclavicular normal   Musculoskeletal: No swelling or deformity   Lines/Devices:  Intact, no erythema, drainage or tenderness   Psych: Alert and oriented, normal mood affect given the setting       Data Review:     CBC:  Recent Labs     08/05/19  0608 08/04/19 0630   WBC 8.1 8.5   HGB 10.1* 10.3*   HCT 31.6* 31.9*    311       BMP:  Recent Labs     08/06/19  0633 08/05/19  0608 08/04/19 0630   CREA 0.89 0.75* 0.69*   BUN 11 9 8   * 132* 132*   K 4.5 3.7 3.6   CL 94* 96* 95*   CO2 32 29 29   AGAP 5* 7 8   * 283* 230*       LFTS:  No results for input(s): TBILI, ALT, SGOT, AP, TP, ALB in the last 72 hours. Microbiology:     All Micro Results     Procedure Component Value Units Date/Time    CULTURE, BLOOD [[id number]] Collected:  08/03/19 1008    Order Status:  Completed Specimen:  Blood Updated:  08/06/19 0947     Special Requests: --        RIGHT  FOREARM       Culture result: NO GROWTH 3 DAYS       CULTURE, BLOOD [[id number]] Collected:  08/03/19 1014    Order Status:  Completed Specimen:  Blood Updated:  08/06/19 0947     Special Requests: --        LEFT  Antecubital       Culture result: NO GROWTH 3 DAYS       CULTURE, BLOOD [[id number]] Collected:  08/01/19 1043    Order Status:  Completed Specimen:  Blood Updated:  08/06/19 0946     Special Requests: --        RIGHT  FOREARM       Culture result: NO GROWTH 5 DAYS       CULTURE, BLOOD [[id number]]  (Abnormal)  (Susceptibility) Collected:  08/01/19 1044    Order Status:  Completed Specimen:  Blood Updated:  08/04/19 0618     Special Requests: --        LEFT  FOREARM       GRAM STAIN       GRAM POS COCCI IN CLUSTERS            AEROBIC BOTTLE POSITIVE               RESULTS VERIFIED, PHONED TO AND READ BACK BY Ike Virk RN @6455 8/2/19 HF           Culture result: STAPHYLOCOCCUS AUREUS               MRSA target DNA sequences not detected: SA target DNA sequence detected within the sample.  Test performed by PCR  Culture/Sensitivity to follow          CULTURE, BLOOD [[id number]]  (Abnormal) Collected:  07/30/19 1311    Order Status:  Completed Specimen:  Blood Updated:  08/01/19 0751     Special Requests: --        LEFT  FOREARM       GRAM STAIN       GRAM POS COCCI IN CLUSTERS            AEROBIC BOTTLE POSITIVE               CRITICAL RESULT NOT CALLED DUE TO PREVIOUS NOTIFICATION OF CRITICAL RESULT WITHIN THE LAST 24 HOURS.            Culture result: STAPHYLOCOCCUS AUREUS         FOR SUSCEPTIBILITY REFER TO ACCESSION [id number]    CULTURE, BLOOD [[id number]]  (Abnormal) Collected:  07/30/19 1316    Order Status:  Completed Specimen:  Blood Updated:  08/01/19 0750     Special Requests: --        LEFT  ARM       GRAM STAIN       GRAM POS COCCI IN CLUSTERS            AEROBIC BOTTLE POSITIVE               RESULTS VERIFIED, PHONED TO AND READ BACK BY Fabiola Simon RN AT 3026 ON 063803 MT           Culture result: STAPHYLOCOCCUS AUREUS         FOR SUSCEPTIBILITY REFER TO ACCESSION [id number]    CULTURE, URINE [[id number]]  (Abnormal)  (Susceptibility) Collected:  07/28/19 1901    Order Status:  Completed Specimen:  Urine from Clean catch Updated:  07/31/19 0730     Special Requests: NO SPECIAL REQUESTS        Culture result:       >100,000 COLONIES/mL STAPHYLOCOCCUS AUREUS          CULTURE, BLOOD [[id number]]  (Abnormal)  (Susceptibility) Collected:  07/28/19 1945    Order Status:  Completed Specimen:  Blood Updated:  07/31/19 0642     Special Requests: --        RIGHT  JUGULAR VEIN       GRAM STAIN       GRAM POS COCCI IN CLUSTERS                  AEROBIC AND ANAEROBIC BOTTLES                  RESULTS VERIFIED, PHONED TO AND READ BACK BY ALFONSO [address] RN AT [address] [place] [address] ON 315349 MT           Culture result: STAPHYLOCOCCUS AUREUS               STAPHYLOCOCCUS AUREUS DETECTED Test Performed by Multiplex PCR                  RESULTS VERIFIED, PHONED TO AND READ BACK BY  ALFONSO [address] RN AT 0461 [place] 077217 MT      CULTURE, BLOOD [[id number]]  (Abnormal)  (Susceptibility) Collected:  07/28/19 2339    Order Status:  Completed Specimen:  Blood Updated:  07/31/19 0642     Special Requests: --        LEFT  HAND       GRAM STAIN       Lucas Waite POS COCCI IN CLUSTERS            AEROBIC BOTTLE POSITIVE               CRITICAL RESULT NOT CALLED DUE TO PREVIOUS NOTIFICATION OF CRITICAL RESULT WITHIN THE LAST 24 HOURS.            Culture result: STAPHYLOCOCCUS AUREUS             Imaging:   See [facility]/EPIC     Signed By: Gita Husain NP     August 6, 2019

## 2019-08-07 VITALS
HEART RATE: 116 BPM | HEIGHT: 71 IN | WEIGHT: 162.1 LBS | RESPIRATION RATE: 15 BRPM | OXYGEN SATURATION: 97 % | TEMPERATURE: 98.3 F | SYSTOLIC BLOOD PRESSURE: 132 MMHG | BODY MASS INDEX: 22.69 KG/M2 | DIASTOLIC BLOOD PRESSURE: 84 MMHG

## 2019-08-07 LAB
ANION GAP SERPL CALC-SCNC: 9 MMOL/L (ref 7–16)
BASOPHILS # BLD: 0.1 K/UL (ref 0–0.2)
BASOPHILS NFR BLD: 1 % (ref 0–2)
BUN SERPL-MCNC: 10 MG/DL (ref 6–23)
CALCIUM SERPL-MCNC: 9.1 MG/DL (ref 8.3–10.4)
CHLORIDE SERPL-SCNC: 94 MMOL/L (ref 98–107)
CO2 SERPL-SCNC: 28 MMOL/L (ref 21–32)
CREAT SERPL-MCNC: 0.76 MG/DL (ref 0.8–1.5)
DIFFERENTIAL METHOD BLD: ABNORMAL
EOSINOPHIL # BLD: 0 K/UL (ref 0–0.8)
EOSINOPHIL NFR BLD: 0 % (ref 0.5–7.8)
ERYTHROCYTE [DISTWIDTH] IN BLOOD BY AUTOMATED COUNT: 14.5 % (ref 11.9–14.6)
GLUCOSE BLD STRIP.AUTO-MCNC: 269 MG/DL (ref 65–100)
GLUCOSE SERPL-MCNC: 274 MG/DL (ref 65–100)
HCT VFR BLD AUTO: 31.3 % (ref 41.1–50.3)
HGB BLD-MCNC: 9.8 G/DL (ref 13.6–17.2)
IMM GRANULOCYTES # BLD AUTO: 0.1 K/UL (ref 0–0.5)
IMM GRANULOCYTES NFR BLD AUTO: 1 % (ref 0–5)
LYMPHOCYTES # BLD: 1.5 K/UL (ref 0.5–4.6)
LYMPHOCYTES NFR BLD: 16 % (ref 13–44)
MCH RBC QN AUTO: 26 PG (ref 26.1–32.9)
MCHC RBC AUTO-ENTMCNC: 31.3 G/DL (ref 31.4–35)
MCV RBC AUTO: 83 FL (ref 79.6–97.8)
MONOCYTES # BLD: 0.7 K/UL (ref 0.1–1.3)
MONOCYTES NFR BLD: 7 % (ref 4–12)
NEUTS SEG # BLD: 7.3 K/UL (ref 1.7–8.2)
NEUTS SEG NFR BLD: 75 % (ref 43–78)
NRBC # BLD: 0 K/UL (ref 0–0.2)
PLATELET # BLD AUTO: 342 K/UL (ref 150–450)
PMV BLD AUTO: 9.7 FL (ref 9.4–12.3)
POTASSIUM SERPL-SCNC: 3.9 MMOL/L (ref 3.5–5.1)
RBC # BLD AUTO: 3.77 M/UL (ref 4.23–5.6)
SODIUM SERPL-SCNC: 131 MMOL/L (ref 136–145)
WBC # BLD AUTO: 9.7 K/UL (ref 4.3–11.1)

## 2019-08-07 PROCEDURE — 74011250636 HC RX REV CODE- 250/636: Performed by: INTERNAL MEDICINE

## 2019-08-07 PROCEDURE — 74011636637 HC RX REV CODE- 636/637: Performed by: INTERNAL MEDICINE

## 2019-08-07 PROCEDURE — 82962 GLUCOSE BLOOD TEST: CPT

## 2019-08-07 PROCEDURE — 74011250637 HC RX REV CODE- 250/637: Performed by: INTERNAL MEDICINE

## 2019-08-07 PROCEDURE — 74011250637 HC RX REV CODE- 250/637: Performed by: PHYSICIAN ASSISTANT

## 2019-08-07 PROCEDURE — 80048 BASIC METABOLIC PNL TOTAL CA: CPT

## 2019-08-07 PROCEDURE — 85025 COMPLETE CBC W/AUTO DIFF WBC: CPT

## 2019-08-07 RX ORDER — ALBUTEROL SULFATE 90 UG/1
1 AEROSOL, METERED RESPIRATORY (INHALATION)
Qty: 1 INHALER | Refills: 0 | Status: SHIPPED | OUTPATIENT
Start: 2019-08-07

## 2019-08-07 RX ORDER — METOPROLOL SUCCINATE 100 MG/1
100 TABLET, EXTENDED RELEASE ORAL DAILY
Qty: 30 TAB | Refills: 0 | Status: SHIPPED | OUTPATIENT
Start: 2019-08-07 | End: 2019-08-07

## 2019-08-07 RX ORDER — CEFAZOLIN SODIUM/WATER 2 G/20 ML
2 SYRINGE (ML) INTRAVENOUS EVERY 8 HOURS
Qty: 2280 ML | Refills: 0 | Status: SHIPPED | OUTPATIENT
Start: 2019-08-07

## 2019-08-07 RX ORDER — METOPROLOL SUCCINATE 100 MG/1
100 TABLET, EXTENDED RELEASE ORAL DAILY
Qty: 30 TAB | Refills: 0 | Status: SHIPPED | OUTPATIENT
Start: 2019-08-07

## 2019-08-07 RX ORDER — INSULIN GLARGINE 100 [IU]/ML
80 INJECTION, SOLUTION SUBCUTANEOUS DAILY
Status: DISCONTINUED | OUTPATIENT
Start: 2019-08-07 | End: 2019-08-07

## 2019-08-07 RX ORDER — INSULIN GLARGINE 100 [IU]/ML
INJECTION, SOLUTION SUBCUTANEOUS
Qty: 2 VIAL | Refills: 0 | Status: SHIPPED | OUTPATIENT
Start: 2019-08-07

## 2019-08-07 RX ORDER — INSULIN GLARGINE 100 [IU]/ML
75 INJECTION, SOLUTION SUBCUTANEOUS DAILY
Status: DISCONTINUED | OUTPATIENT
Start: 2019-08-07 | End: 2019-08-07 | Stop reason: HOSPADM

## 2019-08-07 RX ORDER — INSULIN LISPRO 100 [IU]/ML
INJECTION, SOLUTION INTRAVENOUS; SUBCUTANEOUS
Qty: 1 VIAL | Refills: 0 | Status: SHIPPED | OUTPATIENT
Start: 2019-08-07

## 2019-08-07 RX ORDER — FLUTICASONE PROPIONATE 50 MCG
SPRAY, SUSPENSION (ML) NASAL
Qty: 1 BOTTLE | Refills: 0 | Status: SHIPPED | OUTPATIENT
Start: 2019-08-07

## 2019-08-07 RX ORDER — TRAMADOL HYDROCHLORIDE 50 MG/1
50 TABLET ORAL
Qty: 12 TAB | Refills: 0 | Status: SHIPPED | OUTPATIENT
Start: 2019-08-07 | End: 2019-08-10

## 2019-08-07 RX ADMIN — METOPROLOL SUCCINATE 100 MG: 100 TABLET, EXTENDED RELEASE ORAL at 09:01

## 2019-08-07 RX ADMIN — SODIUM CHLORIDE, PRESERVATIVE FREE 300 UNITS: 5 INJECTION INTRAVENOUS at 05:32

## 2019-08-07 RX ADMIN — Medication 10 ML: at 09:02

## 2019-08-07 RX ADMIN — ENOXAPARIN SODIUM 40 MG: 40 INJECTION SUBCUTANEOUS at 09:11

## 2019-08-07 RX ADMIN — INSULIN LISPRO 6 UNITS: 100 INJECTION, SOLUTION INTRAVENOUS; SUBCUTANEOUS at 07:54

## 2019-08-07 RX ADMIN — INSULIN LISPRO 18 UNITS: 100 INJECTION, SOLUTION INTRAVENOUS; SUBCUTANEOUS at 07:54

## 2019-08-07 RX ADMIN — PANTOPRAZOLE SODIUM 40 MG: 40 TABLET, DELAYED RELEASE ORAL at 05:32

## 2019-08-07 RX ADMIN — POTASSIUM & SODIUM PHOSPHATES POWDER PACK 280-160-250 MG 1 PACKET: 280-160-250 PACK at 09:03

## 2019-08-07 RX ADMIN — Medication 2 G: at 09:00

## 2019-08-07 RX ADMIN — INSULIN GLARGINE 75 UNITS: 100 INJECTION, SOLUTION SUBCUTANEOUS at 09:01

## 2019-08-07 RX ADMIN — Medication 2 G: at 02:12

## 2019-08-07 NOTE — ROUTINE PROCESS
Discharge instructions and prescriptions provided and explained to patient, patient voiced understanding. Medication side effect sheet reviewed with pt. No home meds or valuables to return. Opportunity for questions provided. Pt getting morning dose of Ancef. Instructed to call once antibiotic is complete and ready to leave the floor.

## 2019-08-07 NOTE — DIABETES MGMT
Patient's blood glucose ranged 128-333 yesterday with patient receiving Lantus 70 units and Humalog 63 units. Blood glucose 269 this morning. Reviewed with patient. Patient to discharge today. Provider stopped oral medications and plans to send patient out on a regimen of Lantus 75 units daily, Humalog 18 units with meals, and Humalog SSI. Educated pt re: current basal/bolus regimen of Lantus and Humalog including type of insulin, timing with meals, onset, duration of effect, and peak of insulin dose. Pt verbalizes understanding. Educated patient that he should always check his blood glucose prior to taking insulin to reduce his risk for hypoglycemia and gives his provider the information they need to help adjust his regimen. Reviewed s/s, tx of hypoglycemia. Educated patient that as he gets his diabetes under control he will have an increased risk for hypoglycemia and should report persistent hypoglycemia or hyperglycemia to his PCP for guidance on how to adjust his regimen. Patient's significant other states she has made an appointment for the patient with her provider, Dr. Fady Grover, on August 19th. Encouraged patient to be compliant with regimen, to take his blood glucose log to appointment with Dr. Angelina Jaeger so she can titrate his regimen, and to continue to work on lifestyle modifications. Note diet juices and sugar free cookies in bed with patient, reinforced with patient these will still impact his blood glucose and to always focus on food labels when making food and beverage choices. Patient will need reinforcement of needed dietary changes and encouraged patient to attend outpatient diabetic classes in the future. Patient is going home with home health, hopefully they can follow up with patient on his control. Patient verbalized understanding of teaching and voices no questions at this time.

## 2019-08-07 NOTE — PROGRESS NOTES
Taken to exit via [facility] with staff x1, no complaints, NAD. Family met pt at exit with vehicle. Discharge RN and [place] infusion RN reviewed discharge instructions with opportunity for questions and clarifications.

## 2019-08-07 NOTE — PROGRESS NOTES
Per CM [facility] wanting to see pt one more time prior to discharge. Pt to leave once [facility] sees pt.

## 2019-08-07 NOTE — DISCHARGE SUMMARY
Hospitalist Discharge Summary     Admit Date:  2019  7:18 PM   Name:  Holly Gentile   Age:  44 y.o.  :  1970   MRN:  [id number]   PCP:  Dinesh Patel MD  Treatment Team: Attending Provider: Saritha Ugalde DO; Care Manager: Ana María Perez, RN; Utilization Review: Kristina Alejandro RN; Consulting Provider: Prateek Arango MD; Consulting Provider: Marcelino Moon MD    Problem List for this Hospitalization:  Hospital Problems as of 2019 Date Reviewed: 2019          Codes Class Noted - Resolved POA    Systolic CHF, chronic ([facility] ICD-10-CM: I50.22  ICD-9-CM: 428.22, 428.0  2019 - Present Unknown        Acute septic pulmonary embolism without acute cor pulmonale ([facility] ICD-10-CM: I26.90  ICD-9-CM: 415.12  2019 - Present Unknown        Bacteremia due to methicillin susceptible Staphylococcus aureus (MSSA) ICD-10-CM: R78.81  ICD-9-CM: 790.7, 041.11  2019 - Present Unknown        Pulmonary cavitary lesion ICD-10-CM: J98.4  ICD-9-CM: 518.89  2019 - Present Yes        Acute pyelonephritis ICD-10-CM: N10  ICD-9-CM: 590.10  2019 - Present Yes        * (Principal) Sepsis due to methicillin susceptible Staphylococcus aureus ([facility] ICD-10-CM: A41.01  ICD-9-CM: 038.11, 995.91  2019 - Present Yes        DKA (diabetic ketoacidoses) ([facility] ICD-10-CM: E13.10  ICD-9-CM: 250.10  2019 - Present Yes        DM (diabetes mellitus) ([facility] (Chronic) ICD-10-CM: E11.9  ICD-9-CM: 250.00  2013 - Present Yes        HTN (hypertension) (Chronic) ICD-10-CM: I10  ICD-9-CM: 401.9  2013 - Present Yes                Admission HPI from 2019:    \"Patient is a 44 y.o. male who presents to the ER c/o weakness, vomitting, ABD pain, and chills starting SAT. PT diagnosed with UTI 3 wks ago and complated 7 day PO antibiotic course. \"I got sick again on  Sat. \"  Wife present with pt at time of interview with Dr. Carol Chambers in ER.    Wife states symptoms happened after family trip to the beach. C/O left flank pain but denies fevers or h/o kidney stones. Pt labs show hyperglycemia, metabolic acidosis with urinary ketones at time of presentation to ED. Pt also has WBC 20K. With signs of persistent UTI. ER lactic acid over 3.0  \"    Hospital Course:  Weakness,chills abd pain presentation- found to have dka, sepsis pyelonephritis and septic emboli. MSSA bacteremia. Rocephin change to ancef. REGGIE negative for endocarditis. LVEF 40-45%. Appropriate meds recommended toprol and ace inh. Not currently on ace inh. And need to be addressed as outpt. Will need follow up cardiology. ·  ID OPAT Orders:  · Ancef 2 gm IV Q 8 hrs X 6 weeks with EOT 9/16/19  · Routine PICC care  · Q Monday CBC with diff, Scr, LFT's  · Please fax results to [phone]  · ID office follow-up is scheduled for 3 & 6 weeks, TBA    Has picc line and home health arranged. Due to labile sugars pt will continue with ss insulin humalog and fixed prandial dosing humalog and higher than prior dose lantus. Oral meds metformin and [place] and [place] can be resumed in future if remains stable. Will need close outpt follow up. Follow up instructions and discharge meds at bottom of this note. Plan was discussed with patient and staff. All questions answered. Patient was stable at time of discharge. 10 systems reviewed and negative except as noted in HPI. Diagnostic Imaging/Tests:   Ct Chest W Cont    Result Date: 7/29/2019  EXAM: CT Angiogram of the chest. INDICATION: Evaluate mass seen on abdomen CT. Comparison: Same day abdomen CT. Multiple axial images were obtained through the chest after intravenous infusion of 100mL of Isovue 370. Radiation dose reduction techniques were used for this study: All CT scans performed at this facility use one or all of the following: Automated exposure control, adjustment of the mA and/or kVp according to patient's size, iterative reconstruction.  FINDINGS: CHEST: LUNGS/PLEURA: Central airways are clear. No pneumothorax or pleural effusion. There is a subpleural right middle lobe 9 mm solid pulmonary nodules central cavitation. There is atelectatic change of the right lung base. There is atelectatic change of the left lung base with a subpleural 13 x 21 mm pulmonary nodule with central cavitation as seen on abdomen CT. There is a left upper lobe subpleural 6 mm solid pulmonary nodule adjacent to the pericardium. MEDIASTINUM: No suspicious thyroid nodule. Thoracic aorta is normal in caliber. Moderate coronary artery calcific atherosclerosis. Heart is normal in size. No mediastinal or hilar adenopathy. Esophagus is unremarkable. BONES/SUBCUTANEOUS TISSUE: No aggressive osseous lesion. No subcutaneous soft tissue abnormality. UPPER ABDOMEN: Limited visualization of the upper abdomen is unremarkable. IMPRESSION: 1. Right middle and left lower lobe solid pulmonary nodules with central cavitation, the left lower lobe pulmonary nodule measuring approximately 13 x 21 mm. Additional subpleural 4 mm left upper lobe solid pulmonary nodule which is indeterminate. Given the presence of more than one cavitary pulmonary nodule septic thromboemboli should be considered additionally cavitary pneumonia, pulmonary infarction, Wegener's granulomatosis, or lung carcinoma can present with cavitary pulmonary nodules. Clinical correlation is advised. Ct Abd Pelv Wo Cont    Result Date: 7/29/2019  EXAM: CT of the abdomen and pelvis without contrast. Indication: Uncomplicated pyelonephritis. Comparison: None. Multiple axial images were obtained through the abdomen and pelvis without IV contrast.  Radiation dose reduction techniques were used for this study: All CT scans performed at this facility use one or all of the following: Automated exposure control, adjustment of the mA and/or kVp according to patient's size, iterative reconstruction.  FINDINGS: LOWER THORAX: Left lung base 23 x 11 mm solid pulmonary nodule with central cavitation just above the left hemidiaphragm. Subpleural anterior left lower lobe 8 mm solid pulmonary nodule with a central cavitation. LIVER: Normal size and contour. No focal liver lesions. BILIARY SYSTEM: The gallbladder is normal. No biliary duct dilation. SPLEEN: No splenomegaly or suspicious splenic lesion. PANCREAS: No pancreatic mass. No pancreatic duct dilation. ADRENALS: No adrenal nodule or adrenal hypertrophy. URINARY SYSTEM: Limited evaluation of the kidneys without IV contrast. No perinephric fluid collection to suggest perinephric abscess. No hydronephrosis. Exophytic intermediate density left lower pole renal mass and completely characterized. Bladder is unremarkable. There is left perinephric fat stranding. FLUID:  No free fluid. REPRODUCTIVE ORGANS: Unremarkable. BOWEL: Stomach, small bowel, and large bowel are normal in course and caliber. No evidence of obstruction. VASCULAR/NODES: No aortic or iliac artery aneurysm. No lymphadenopathy. BONES/SUBCUTANEOUS TISSUE: Subcutaneous emphysematous changes of the right anterior lower quadrant abdominal wall likely injection sites. IMPRESSION: 1. Left-sided perinephric fat stranding without a perinephric fluid collection to suggest abscess. These findings are suspicious however nondiagnostic pyelonephritis. There is a left renal exophytic 2 cm intermediate density mass which isn't completely characterized. No hydronephrosis. Recommend follow-up with CT renal mass protocol when acute symptoms resolve. 2. Left lower lobe subpleural 23 x 11 mm solid pulmonary nodules central cavitation as well as a 8 mm solid pulmonary nodules central cavitation of the right basal anterior segment of the lower lobe. These findings are suspicious for septic emboli given multiplicity and subpleural location however malignancy cannot be excluded. Recommend chest CT for further evaluation. Xr Chest Port    Result Date: 7/28/2019  Portable chest xray  COMPARISON: None. CLINICAL HISTORY: Meets SIRS criterion FINDINGS: No focal pulmonary consolidation, pleural effusion or pneumothorax. No pulmonary edema. Cardiac mediastinal contour is within normal limits. Surrounding bones are unremarkable. IMPRESSION: 1. No pulmonary consolidation. Echocardiogram results:  Results for orders placed or performed during the hospital encounter of 07/28/19   2D ECHO COMPLETE ADULT (TTE) W OR WO CONTR    Narrative    Report creation error on 7/30/2019 12:03:58 PM    An error occurred while generating this report. The most common cause of such  errors are incomplete, duplicate, or corrupted findings. To fix this error:  return to the Outline Viewer, click the Tools button, and select \"Check  findings\". If the problem persists, contact your IS .          All Micro Results     Procedure Component Value Units Date/Time    CULTURE, BLOOD [[id number]] Collected:  08/03/19 1008    Order Status:  Completed Specimen:  Blood Updated:  08/06/19 0947     Special Requests: --        RIGHT  FOREARM       Culture result: NO GROWTH 3 DAYS       CULTURE, BLOOD [[id number]] Collected:  08/03/19 1014    Order Status:  Completed Specimen:  Blood Updated:  08/06/19 0947     Special Requests: --        LEFT  Antecubital       Culture result: NO GROWTH 3 DAYS       CULTURE, BLOOD [[id number]] Collected:  08/01/19 1043    Order Status:  Completed Specimen:  Blood Updated:  08/06/19 0946     Special Requests: --        RIGHT  FOREARM       Culture result: NO GROWTH 5 DAYS       CULTURE, BLOOD [[id number]]  (Abnormal)  (Susceptibility) Collected:  08/01/19 1044    Order Status:  Completed Specimen:  Blood Updated:  08/04/19 0618     Special Requests: --        LEFT  FOREARM       GRAM STAIN       GRAM POS COCCI IN CLUSTERS            AEROBIC BOTTLE POSITIVE               RESULTS VERIFIED, PHONED TO AND READ BACK BY Saad Celis RN @9306 8/2/19 HF           Culture result: STAPHYLOCOCCUS AUREUS MRSA target DNA sequences not detected: SA target DNA sequence detected within the sample. Test performed by PCR  Culture/Sensitivity to follow          CULTURE, BLOOD [[id number]]  (Abnormal) Collected:  07/30/19 1311    Order Status:  Completed Specimen:  Blood Updated:  08/01/19 0751     Special Requests: --        LEFT  FOREARM       GRAM STAIN       GRAM POS COCCI IN CLUSTERS            AEROBIC BOTTLE POSITIVE               CRITICAL RESULT NOT CALLED DUE TO PREVIOUS NOTIFICATION OF CRITICAL RESULT WITHIN THE LAST 24 HOURS.            Culture result: STAPHYLOCOCCUS AUREUS         FOR SUSCEPTIBILITY REFER TO ACCESSION [id number]    CULTURE, BLOOD [[id number]]  (Abnormal) Collected:  07/30/19 1316    Order Status:  Completed Specimen:  Blood Updated:  08/01/19 0750     Special Requests: --        LEFT  ARM       GRAM STAIN       GRAM POS COCCI IN CLUSTERS            AEROBIC BOTTLE POSITIVE               RESULTS VERIFIED, PHONED TO AND READ BACK BY Lela Canales RN AT [facility] ON [id number] MT           Culture result: STAPHYLOCOCCUS AUREUS         FOR SUSCEPTIBILITY REFER TO ACCESSION [id number]    CULTURE, URINE [[id number]]  (Abnormal)  (Susceptibility) Collected:  07/28/19 1901    Order Status:  Completed Specimen:  Urine from Clean catch Updated:  07/31/19 0730     Special Requests: NO SPECIAL REQUESTS        Culture result:       >100,000 COLONIES/mL STAPHYLOCOCCUS AUREUS          CULTURE, BLOOD [[id number]]  (Abnormal)  (Susceptibility) Collected:  07/28/19 1945    Order Status:  Completed Specimen:  Blood Updated:  07/31/19 0642     Special Requests: --        RIGHT  JUGULAR VEIN       GRAM STAIN       GRAM POS COCCI IN CLUSTERS                  AEROBIC AND ANAEROBIC BOTTLES                  RESULTS VERIFIED, PHONED TO AND READ BACK BY ALFONSO [address] RN AT [address] [place] [address] ON [id number] MT           Culture result: STAPHYLOCOCCUS AUREUS               STAPHYLOCOCCUS AUREUS DETECTED Test Performed by Multiplex PCR                  RESULTS VERIFIED, PHONED TO AND READ BACK BY  ALFONSO STANTON RN AT 1755 ON 488456 MT      CULTURE, BLOOD [[id number]]  (Abnormal)  (Susceptibility) Collected:  07/28/19 9919    Order Status:  Completed Specimen:  Blood Updated:  07/31/19 0642     Special Requests: --        LEFT  HAND       GRAM STAIN       GRAM POS COCCI IN CLUSTERS            AEROBIC BOTTLE POSITIVE               CRITICAL RESULT NOT CALLED DUE TO PREVIOUS NOTIFICATION OF CRITICAL RESULT WITHIN THE LAST 24 HOURS. Culture result: STAPHYLOCOCCUS AUREUS             Labs: Results:       BMP, Mg, Phos Recent Labs     08/07/19 0432 08/06/19  0633 08/05/19  0608   * 131* 132*   K 3.9 4.5 3.7   CL 94* 94* 96*   CO2 28 32 29   AGAP 9 5* 7   BUN 10 11 9   CREA 0.76* 0.89 0.75*   CA 9.1 9.3 8.7   * 327* 283*      CBC Recent Labs     08/07/19 0432 08/05/19  0608   WBC 9.7 8.1   RBC 3.77* 3.86*   HGB 9.8* 10.1*   HCT 31.3* 31.6*    305   GRANS 75  --    LYMPH 16  --    EOS 0*  --    MONOS 7  --    BASOS 1  --    IG 1  --    ANEU 7.3  --    ABL 1.5  --    CAROLINA 0.0  --    ABM 0.7  --    ABB 0.1  --    AIG 0.1  --       LFT No results for input(s): SGOT, ALT, TBIL, AP, TP, ALB, GLOB, AGRAT, GPT in the last 72 hours.    Cardiac Testing No results found for: BNPP, BNP, CPK, RCK1, RCK2, RCK3, RCK4, CKMB, CKNDX, CKND1, TROPT, TROIQ   Coagulation Tests Lab Results   Component Value Date/Time    Prothrombin time 13.8 08/06/2019 05:15 PM    INR 1.1 08/06/2019 05:15 PM      A1c Lab Results   Component Value Date/Time    Hemoglobin A1c 11.9 (H) 07/30/2019 03:21 AM    Hemoglobin A1c 9.6 (H) 01/08/2016 03:12 PM    Hemoglobin A1c 9.4 (H) 11/27/2013 08:50 AM      Lipid Panel Lab Results   Component Value Date/Time    Cholesterol, total 217 (H) 11/27/2013 08:50 AM    HDL Cholesterol 41 11/27/2013 08:50 AM    LDL, calculated 129 (H) 11/27/2013 08:50 AM    VLDL, calculated 47 (H) 11/27/2013 08:50 AM    Triglyceride 236 (H) 11/27/2013 08:50 AM      Thyroid Panel No results found for: TSH, T4, FT4, TT3, T3U, TSHEXT, TSHEXT     Most Recent UA Lab Results   Component Value Date/Time    Color Yellow 11/27/2013 08:55 AM    Appearance Clear 11/27/2013 08:55 AM        Allergies   Allergen Reactions    Influenza Virus Vaccine, Specific Nausea and Vomiting     Immunization History   Administered Date(s) Administered    Influenza Vaccine 10/24/2011    TD Vaccine 06/22/2009    Td, Adsorbed 03/06/2018       All Labs from Last 24 Hrs:  Recent Results (from the past 24 hour(s))   GLUCOSE, POC    Collection Time: 08/06/19 11:30 AM   Result Value Ref Range    Glucose (POC) 261 (H) 65 - 100 mg/dL   GLUCOSE, POC    Collection Time: 08/06/19  3:16 PM   Result Value Ref Range    Glucose (POC) 214 (H) 65 - 100 mg/dL   GLUCOSE, POC    Collection Time: 08/06/19  4:45 PM   Result Value Ref Range    Glucose (POC) 203 (H) 65 - 100 mg/dL   PROTHROMBIN TIME + INR    Collection Time: 08/06/19  5:15 PM   Result Value Ref Range    Prothrombin time 13.8 11.7 - 14.5 sec    INR 1.1     GLUCOSE, POC    Collection Time: 08/06/19  9:12 PM   Result Value Ref Range    Glucose (POC) 128 (H) 65 - 573 mg/dL   METABOLIC PANEL, BASIC    Collection Time: 08/07/19  4:32 AM   Result Value Ref Range    Sodium 131 (L) 136 - 145 mmol/L    Potassium 3.9 3.5 - 5.1 mmol/L    Chloride 94 (L) 98 - 107 mmol/L    CO2 28 21 - 32 mmol/L    Anion gap 9 7 - 16 mmol/L    Glucose 274 (H) 65 - 100 mg/dL    BUN 10 6 - 23 MG/DL    Creatinine 0.76 (L) 0.8 - 1.5 MG/DL    GFR est AA >60 >60 ml/min/1.73m2    GFR est non-AA >60 >60 ml/min/1.73m2    Calcium 9.1 8.3 - 10.4 MG/DL   CBC WITH AUTOMATED DIFF    Collection Time: 08/07/19  4:32 AM   Result Value Ref Range    WBC 9.7 4.3 - 11.1 K/uL    RBC 3.77 (L) 4.23 - 5.6 M/uL    HGB 9.8 (L) 13.6 - 17.2 g/dL    HCT 31.3 (L) 41.1 - 50.3 %    MCV 83.0 79.6 - 97.8 FL    MCH 26.0 (L) 26.1 - 32.9 PG    MCHC 31.3 (L) 31.4 - 35.0 g/dL    RDW 14.5 11.9 - 14.6 %    PLATELET 417 254 - 726 K/uL MPV 9.7 9.4 - 12.3 FL    ABSOLUTE NRBC 0.00 0.0 - 0.2 K/uL    DF AUTOMATED      NEUTROPHILS 75 43 - 78 %    LYMPHOCYTES 16 13 - 44 %    MONOCYTES 7 4.0 - 12.0 %    EOSINOPHILS 0 (L) 0.5 - 7.8 %    BASOPHILS 1 0.0 - 2.0 %    IMMATURE GRANULOCYTES 1 0.0 - 5.0 %    ABS. NEUTROPHILS 7.3 1.7 - 8.2 K/UL    ABS. LYMPHOCYTES 1.5 0.5 - 4.6 K/UL    ABS. MONOCYTES 0.7 0.1 - 1.3 K/UL    ABS. EOSINOPHILS 0.0 0.0 - 0.8 K/UL    ABS. BASOPHILS 0.1 0.0 - 0.2 K/UL    ABS. IMM. GRANS. 0.1 0.0 - 0.5 K/UL   GLUCOSE, POC    Collection Time: 08/07/19  7:32 AM   Result Value Ref Range    Glucose (POC) 269 (H) 65 - 100 mg/dL       Discharge Exam:  Patient Vitals for the past 24 hrs:   Temp Pulse Resp BP SpO2   08/07/19 0754 98.3 °F (36.8 °C) (!) 116 15 132/84 97 %   08/07/19 0424 98.7 °F (37.1 °C) 95 18 121/86 94 %   08/06/19 2353 99.6 °F (37.6 °C) (!) 107 16 114/67 92 %   08/06/19 1925 98.1 °F (36.7 °C) (!) 103 18 113/72 94 %   08/06/19 1813 98.5 °F (36.9 °C) 98      08/06/19 1630 100.3 °F (37.9 °C) (!) 104 18 147/74 95 %   08/06/19 1316 99.4 °F (37.4 °C) (!) 104 18 114/89 96 %     Oxygen Therapy  O2 Sat (%): 97 % (08/07/19 0754)  Pulse via Oximetry: 114 beats per minute (08/01/19 1010)  O2 Device: Room air (08/01/19 2000)  O2 Flow Rate (L/min): 1 l/min (08/01/19 0950)    Intake/Output Summary (Last 24 hours) at 8/7/2019 0921  Last data filed at 8/6/2019 1805  Gross per 24 hour   Intake 240 ml   Output    Net 240 ml         Physical exam:  General:    Well nourished. Alert. No distress. Eyes:   Normal sclera. Extraocular movements intact. ENT:  Normocephalic, atraumatic. Moist mucous membranes  CV:   Regular rate and rhythm. No murmur, rub, or gallop. Lungs:  Clear to auscultation bilaterally. No wheezing, rhonchi, or rales. Abdomen: Soft, nontender, nondistended. Bowel sounds normal.   Extremities: Warm and dry. No cyanosis or edema. Neurologic: No focal deficits  Skin:     No rashes or jaundice.     Psych:  Normal mood and affect. Discharge Info:   Current Discharge Medication List      START taking these medications    Details   insulin glargine (LANTUS) 100 unit/mL injection 75 units subcutaneous every am  Qty: 2 Vial, Refills: 0      ceFAZolin (ANCEF) in sterile water 2 gram/20 mL 2 g IV syringe 20 mL by IntraVENous route every eight (8) hours. Qty: 2280 mL, Refills: 0      !! insulin lispro (HUMALOG) 100 unit/mL injection INITIATE INSULIN CORRECTIVE PROTOCOL:  For Blood Sugar (mg/dL) of:     Less than 150 =   0 units           150 -199 =   2 units  200 -249 =   4 units  250 -299 =   6 units  300 -349 =   8 units  350 and above = 10 units and Call Physician     Initiate Hypoglycemia protocol if blood glucose is <70  Qty: 1 Vial, Refills: 0      !! insulin lispro (HUMALOG) 100 unit/mL injection 18 units sq tid with meals. In addition to sliding scale insulin coverage  Qty: 1 Vial, Refills: 0      traMADol (ULTRAM) 50 mg tablet Take 1 Tab by mouth every six (6) hours as needed for Pain for up to 3 days. Max Daily Amount: 200 mg. Qty: 12 Tab, Refills: 0    Associated Diagnoses: Acute pyelonephritis      metoprolol succinate (TOPROL-XL) 100 mg tablet Take 1 Tab by mouth daily. Qty: 30 Tab, Refills: 0       !! - Potential duplicate medications found. Please discuss with provider. CONTINUE these medications which have CHANGED    Details   albuterol (PROAIR HFA) 90 mcg/actuation inhaler Take 1 Puff by inhalation every four (4) hours as needed for Wheezing. Qty: 1 Inhaler, Refills: 0    Associated Diagnoses: Sinusitis; Bronchitis      fluticasone propionate (FLONASE) 50 mcg/actuation nasal spray 2 spray each nostril qam  Qty: 1 Bottle, Refills: 0         CONTINUE these medications which have NOT CHANGED    Details   omeprazole (PRILOSEC) 40 mg capsule Take 40 mg by mouth daily. glipiZIDE (GLUCOTROL) 10 mg tablet Take 10 mg by mouth two (2) times a day.       pravastatin (PRAVACHOL) 10 mg tablet Take 1 Tab by mouth nightly. Qty: 30 Tab, Refills: 0    Associated Diagnoses: Hyperlipidemia, unspecified hyperlipidemia type      ACCU-CHEK KAYE PLUS TEST STRP strip Refills: 11      ACCU-CHEK KAYE PLUS METER misc Refills: 0         STOP taking these medications       naproxen (NAPROSYN) 500 mg tablet Comments:   Reason for Stopping:         SITagliptin (JANUVIA) 25 mg tablet Comments:   Reason for Stopping:         insulin glargine (LANTUS SOLOSTAR U-100 INSULIN) 100 unit/mL (3 mL) inpn Comments:   Reason for Stopping:         metFORMIN (GLUCOPHAGE) 1,000 mg tablet Comments:   Reason for Stopping:         Brompheniramine-Pseudoeph-DM (BROMFED DM) 2-30-10 mg/5 mL syrup Comments:   Reason for Stopping:                 Disposition: home    Activity: PT/OT per Home Health  Diet: DIET DIABETIC WITH OPTIONS Consistent Carb 1800kcal; Regular    Follow-up Appointments   Procedures    FOLLOW UP VISIT Appointment in: 3 - 5 Days Follow up with primary care within 3-5 day Follow up ID as directed     Follow up with primary care within 3-5 day   Follow up ID as directed     Standing Status:   Standing     Number of Occurrences:   1     Order Specific Question:   Appointment in     Answer:   3 - 5 Days         Follow-up Information     Follow up With Specialties Details Why Contact Info    Ana Fuentes MD Internal Medicine  LEFT MESSAGE FOR OFFICE TO CALL PATIENT WITH APPOINTMENT DATE AND TIME. [address] [facility] [address] [place] [address]  [address] [place] [address], 1600 [facility], NP Nurse Practitioner On 8/30/2019 3:00  [address]  [address] [place] [address] 18, 1600 [facility], NP Nurse Practitioner On 9/16/2019 10:30 [address] [facility] [address] [place] [address]  [phone]              Time spent in patient discharge planning and coordination 42 minutes.     Signed:  Nancy Schwab

## 2019-08-07 NOTE — PROGRESS NOTES
Hourly rounds completed. All needs met. Pt c/o Headache. Interventions per MAR. Gave report to the oncoming day shift nurse.

## 2019-08-07 NOTE — PROGRESS NOTES
Patient to discharge home today with [facility]. intramed and sf notified of discharge. Care Management Interventions  PCP Verified by CM: Yes(per pt has new PCP appointment already)  Mode of Transport at Discharge: Other (see comment)  Transition of Care Consult (CM Consult): Discharge Planning, [address]: Yes  Discharge Durable Medical Equipment: (glucometer)  Current Support Network: Own Home(states lives with his wife, Ezra Angulo -[phone].  Unclear is legally  or CL. )  Confirm Follow Up Transport: Self  Plan discussed with Pt/Family/Caregiver: Yes  Freedom of Choice Offered: Yes  [facility] [employer] Information Provided?: (confirms [facility] - able to get rx)  Discharge Location  Discharge Placement: Home with home health

## 2019-08-08 ENCOUNTER — TELEPHONE (OUTPATIENT)
Dept: HOME HEALTH SERVICES | Facility: HOME HEALTH | Age: 49
End: 2019-08-08

## 2019-08-08 LAB
BACTERIA SPEC CULT: NORMAL
BACTERIA SPEC CULT: NORMAL
SERVICE CMNT-IMP: NORMAL
SERVICE CMNT-IMP: NORMAL

## 2019-08-08 NOTE — TELEPHONE ENCOUNTER
Mr. Bishop Goff returned my call. He has been set up with his IV antibiotics and said he understands how to do it. He did not want to review medications right now. His only need is some more needles for his insulin syringes. He needs a prescription and wanted to know who to call. I recommended he call his PCP. He said he was getting set up with a new PCP on 8/19. He said he thought he had enough needles to last until that appointment. I told him how important his compliance with his insulin was and to not run out. I told him the CM at the hospital could also help. He said he had her number. I asked him to call back with any other medication issues.

## 2019-08-08 NOTE — TELEPHONE ENCOUNTER
[address] [facility] [place] Pharmacist consult. Mr. Benitez Thompson was discharged yesterday after being at [facility] with sepsis due to KIMBERLY Bacteremia. I have called and gotten voice mail. I left my name and cell phone number. I asked him to call me with any medication questions or issues. I touched base with [facility] intake and found he was referred out to [facility]. He can still call me with any medication questions.

## 2022-03-18 PROBLEM — A41.01 SEPSIS DUE TO METHICILLIN SUSCEPTIBLE STAPHYLOCOCCUS AUREUS (HCC): Status: ACTIVE | Noted: 2019-07-29

## 2022-03-19 PROBLEM — N10 ACUTE PYELONEPHRITIS: Status: ACTIVE | Noted: 2019-07-30

## 2022-03-19 PROBLEM — R78.81 BACTEREMIA DUE TO METHICILLIN SUSCEPTIBLE STAPHYLOCOCCUS AUREUS (MSSA): Status: ACTIVE | Noted: 2019-08-01

## 2022-03-19 PROBLEM — I50.22 SYSTOLIC CHF, CHRONIC (HCC): Status: ACTIVE | Noted: 2019-08-06

## 2022-03-19 PROBLEM — B95.61 BACTEREMIA DUE TO METHICILLIN SUSCEPTIBLE STAPHYLOCOCCUS AUREUS (MSSA): Status: ACTIVE | Noted: 2019-08-01

## 2022-03-19 PROBLEM — I26.90 ACUTE SEPTIC PULMONARY EMBOLISM WITHOUT ACUTE COR PULMONALE (HCC): Status: ACTIVE | Noted: 2019-08-01

## 2022-03-20 PROBLEM — J98.4 PULMONARY CAVITARY LESION: Status: ACTIVE | Noted: 2019-07-30

## 2022-10-01 NOTE — DISCHARGE INSTRUCTIONS
Patient Education     DISCHARGE SUMMARY from Nurse    PATIENT INSTRUCTIONS:    After general anesthesia or intravenous sedation, for 24 hours or while taking prescription Narcotics:  · Limit your activities  · Do not drive and operate hazardous machinery  · Do not make important personal or business decisions  · Do  not drink alcoholic beverages  · If you have not urinated within 8 hours after discharge, please contact your surgeon on call. Report the following to your surgeon:  · Excessive pain, swelling, redness or odor of or around the surgical area  · Temperature over 100.5  · Nausea and vomiting lasting longer than 4 hours or if unable to take medications  · Any signs of decreased circulation or nerve impairment to extremity: change in color, persistent  numbness, tingling, coldness or increase pain  · Any questions    What to do at Home:  Recommended activity: Activity as tolerated,     If you experience any of the following symptoms fever, chills, new or unrelieved pain, persistent nausea or vomiting, or any other worrisome symptoms, please follow up with pcp. *  Please give a list of your current medications to your Primary Care Provider. *  Please update this list whenever your medications are discontinued, doses are      changed, or new medications (including over-the-counter products) are added. *  Please carry medication information at all times in case of emergency situations. These are general instructions for a healthy lifestyle:    No smoking/ No tobacco products/ Avoid exposure to second hand smoke  Surgeon General's Warning:  Quitting smoking now greatly reduces serious risk to your health.     Obesity, smoking, and sedentary lifestyle greatly increases your risk for illness    A healthy diet, regular physical exercise & weight monitoring are important for maintaining a healthy lifestyle    You may be retaining fluid if you have a history of heart failure or if you experience any of the following symptoms:  Weight gain of 3 pounds or more overnight or 5 pounds in a week, increased swelling in our hands or feet or shortness of breath while lying flat in bed. Please call your doctor as soon as you notice any of these symptoms; do not wait until your next office visit. The discharge information has been reviewed with the patient. The patient verbalized understanding. Discharge medications reviewed with the patient and appropriate educational materials and side effects teaching were provided. ___________________________________________________________________________________________________________________________________     Sepsis: Care Instructions  Your Care Instructions    Sepsis is an intense reaction to an infection. It can cause deadly damage to the body and lead to a dangerously low blood pressure. You may have inflammation across large areas of your body. It can damage tissue and even go deep into your organs. Infections that can lead to sepsis include:  · A skin infection such as from a cut. · A lung infection like pneumonia. · A kidney infection. · A gut infection such as E. coli. It's important to care for yourself and try to avoid infections so that you don't get sepsis again. Follow-up care is a key part of your treatment and safety. Be sure to make and go to all appointments, and call your doctor if you are having problems. It's also a good idea to know your test results and keep a list of the medicines you take. How can you care for yourself at home? · If your doctor prescribed antibiotics, take them as directed. Do not stop taking them just because you feel better. You need to take the full course of antibiotics. · Help prevent infections that could lead to sepsis:  ? Try to avoid colds and flu. If you must be around people who have a cold or the flu, wash your hands often. And get a flu vaccine every year.   ? Get a pneumococcal vaccine shot (to prevent pneumonia, meningitis, and other infections). If you have had one before, ask your doctor if you need another dose. ? Clean any wounds or scrapes. · Do not smoke or use other tobacco products. When you quit smoking, you are less likely to get a cold, the flu, bronchitis, and pneumonia. If you need help quitting, talk to your doctor about stop-smoking programs and medicines. These can increase your chances of quitting for good. · To prevent dehydration, drink plenty of fluids. Choose water and other caffeine-free clear liquids until you feel better. If you have kidney, heart, or liver disease and have to limit fluids, talk with your doctor before you increase the amount of fluids you drink. · Eat a healthy diet. Include fruits, vegetables, and whole grains in your diet every day. · If your doctor recommends it, try doing some physical activity. Walking is a good choice. Bit by bit, increase the amount you walk every day. When should you call for help? [id number] anytime you think you may need emergency care. For example, call if:    · You passed out (lost consciousness).    Call your doctor now or seek immediate medical care if:    · You have symptoms of sepsis. These may include:  ? Shortness of breath. ? A fast heart rate. ? Cool, pale, or clammy skin. ? Feeling confused.     · You are dizzy or lightheaded, or you feel like you may faint.     · You have a fever or chills.    Watch closely for changes in your health, and be sure to contact your doctor if:    · You do not get better as expected. Where can you learn more? Go to http://giorgio-lance.info/. Enter R124 in the search box to learn more about \"Sepsis: Care Instructions. \"  Current as of: September 23, 2018  Content Version: 12.1  © 5293-7844 [employer]. Care instructions adapted under license by [employer] (which disclaims liability or warranty for this information).  If you have questions about a medical condition or this instruction, always ask your healthcare professional. [place] [address] any warranty or liability for your use of this information. Patient

## 2023-09-26 ENCOUNTER — OFFICE VISIT (OUTPATIENT)
Dept: ORTHOPEDIC SURGERY | Age: 53
End: 2023-09-26

## 2023-09-26 DIAGNOSIS — M70.21 OLECRANON BURSITIS OF RIGHT ELBOW: ICD-10-CM

## 2023-09-26 DIAGNOSIS — M77.8 TENDINITIS OF RIGHT TRICEPS: Primary | ICD-10-CM

## 2023-09-26 RX ORDER — MELOXICAM 15 MG/1
15 TABLET ORAL DAILY
Qty: 21 TABLET | Refills: 0 | Status: SHIPPED | OUTPATIENT
Start: 2023-09-26 | End: 2023-10-17

## 2023-09-26 RX ORDER — BETAMETHASONE SODIUM PHOSPHATE AND BETAMETHASONE ACETATE 3; 3 MG/ML; MG/ML
12 INJECTION, SUSPENSION INTRA-ARTICULAR; INTRALESIONAL; INTRAMUSCULAR; SOFT TISSUE ONCE
Status: COMPLETED | OUTPATIENT
Start: 2023-09-26 | End: 2023-09-26

## 2023-09-26 RX ORDER — MELOXICAM 15 MG/1
15 TABLET ORAL DAILY
Qty: 30 TABLET | Refills: 0 | Status: CANCELLED | OUTPATIENT
Start: 2023-09-26 | End: 2023-10-26

## 2023-09-26 RX ADMIN — BETAMETHASONE SODIUM PHOSPHATE AND BETAMETHASONE ACETATE 12 MG: 3; 3 INJECTION, SUSPENSION INTRA-ARTICULAR; INTRALESIONAL; INTRAMUSCULAR; SOFT TISSUE at 14:15

## 2023-09-26 NOTE — PROGRESS NOTES
[facility] Note    Name: Art Flaherty  YOB: 1970  Gender: male  MRN: [id number]      CC: Patient referred for evaluation of upper extremity pain    HPI: Art Flaherty is a 52 y.o. male Right hand dominant with a chief complaint of right elbow pain, symptoms ongoing for at least 2 months, no injury that he can remember, he was seen at urgent care where he was told he had a fracture so he was referred here for assessment. ROS/Meds/PSH/PMH/FH/SH: I personally reviewed the patients standard intake form. Pertinents are discussed in the HPI    Physical Examination:  General: Awake and alert. HEENT: Normocephalic, atraumatic  CV/Pulm: Breathing even and unlabored  Skin: No obvious rashes noted. Lymphatic: No obvious evidence of lymphedema or lymphadenopathy    Musculoskeletal Exam:  Examination on the right upper extremity demonstrates cap refill < 5 seconds in all fingers, mild swelling of the olecranon bursa, tender palpation at the olecranon bursa and at the insertion of the right triceps, there is some discomfort with resisted wrist extension. Imaging / Electrodiagnostic Tests:     X-rays the right elbow from an outside source was reviewed and independently interpreted, this demonstrates a avulsion fracture of the olecranon enthesophyte consistent with chronic triceps tendinitis    Assessment:   1. Tendinitis of right triceps    2. Olecranon bursitis of right elbow        Plan:   We discussed the diagnosis and different treatment options. We discussed observation, therapy, antiinflammatory medications and other pertinent treatment modalities.     After discussing in detail the patient elects to proceed with right triceps insertion steroid injection, Mobic 15 daily for 4 weeks, compressive elbow sleeve for comfort, I will reassess the patient in 2 months, more likely than not he has triceps tendinitis and a new avulsion fracture of the olecranon enthesophyte however, his triceps

## 2023-09-26 NOTE — PROGRESS NOTES
Patient was fitted and instructed on a Reparel Elbow Sleeve for the right elbow. Patient read and signed documenting they understand and agree to [facility]'s current DME return policy.

## 2024-05-02 NOTE — PROGRESS NOTES
Encounter Date: 5/1/2024       History     Chief Complaint   Patient presents with    Fatigue     Fussy, not as active as normal, started last night, mild congestion, no cough, fever, diarrhea, or vomiting, eating and urinating well, sibling had stomach virus recently      Heather Moss is a 13 m.o. male who has no past medical history on file. presenting to the Emergency Department for fatigue. Both mother and father report patient has been fussy and not acting himself. This started last night. Patient has been fatigued and sleeping more than usual. He is interactive, though less playful. He has had exposure to stomach virus. No vomiting or diarrhea. No fevers. No cough, congestion. No inconsolable crying. No wheezing or difficulty breathing. Parents cannot remember when last BM was. Reports one wet diaper today. Had 2-3 oz of gatorade. Ate a cheeseburger and piece of pie today. UTD on vaccinations. No rash.       The history is provided by the patient, the mother and the father.     Review of patient's allergies indicates:  No Known Allergies  No past medical history on file.  No past surgical history on file.  Family History   Problem Relation Name Age of Onset    Anemia Mother Kerwin Richard         Copied from mother's history at birth    Rashes / Skin problems Mother Kerwin Richard         Copied from mother's history at birth        Review of Systems   Constitutional:  Positive for fatigue. Negative for chills, crying and fever.   HENT:  Negative for congestion, ear discharge, ear pain and rhinorrhea.    Respiratory:  Negative for cough, wheezing and stridor.    Cardiovascular:  Negative for cyanosis.   Gastrointestinal:  Negative for abdominal pain, diarrhea, nausea and vomiting.   Skin:  Negative for color change and rash.   Neurological:  Negative for syncope and weakness.   Psychiatric/Behavioral:  Negative for agitation and confusion.        Physical Exam     Initial Vitals [05/01/24 1744]  Sanjeev Pham .. Sanjeev hPam .TRANSFER - IN REPORT:    Verbal report received from CCU on Deneen Carreon  being received from CCU. Report consisted of patients Situation, Background, Assessment and   Recommendations. Information from the following report  was reviewed with the receiving nurse. Opportunity for questions and clarification was provided. Assessment completed upon patients arrival to unit and care assumed.   BP Pulse Resp Temp SpO2   -- (!) 136 29 98.4 °F (36.9 °C) 100 %      MAP       --         Physical Exam    Nursing note and vitals reviewed.  Constitutional: Vital signs are normal. He appears well-developed and well-nourished. He is not diaphoretic. He is easily engaged. He cries on exam. He regards caregiver. He does not appear ill. No distress.   HENT:   Head: Normocephalic and atraumatic. No signs of injury.   Right Ear: Tympanic membrane, external ear, pinna and canal normal. No mastoid tenderness. No middle ear effusion.   Left Ear: Tympanic membrane, external ear, pinna and canal normal. No mastoid tenderness.  No middle ear effusion.   Nose: Nose normal.   Mouth/Throat: Mucous membranes are moist. No oropharyngeal exudate, pharynx swelling or pharynx erythema. No tonsillar exudate. Oropharynx is clear. Pharynx is normal.   Eyes: Conjunctivae and EOM are normal. Pupils are equal, round, and reactive to light.   Neck: Neck supple. No neck adenopathy.   Normal range of motion.  Cardiovascular:  Normal rate and regular rhythm.           Pulmonary/Chest: Effort normal and breath sounds normal. No nasal flaring or stridor. No respiratory distress.   Abdominal: Abdomen is soft. Bowel sounds are normal. He exhibits no distension. There is no abdominal tenderness. There is no rebound and no guarding.   Genitourinary:    Testes and penis normal.   Right testis shows no tenderness. Left testis shows no tenderness. Circumcised. No penile tenderness.   Musculoskeletal:         General: Normal range of motion.      Cervical back: Normal range of motion and neck supple.     Neurological: He is alert. He exhibits normal muscle tone. GCS score is 15. GCS eye subscore is 4. GCS verbal subscore is 5. GCS motor subscore is 6.   Skin: Skin is warm and dry. Capillary refill takes less than 2 seconds. No rash noted.         ED Course   Procedures  Labs Reviewed   INFLUENZA A & B BY MOLECULAR   SARS-COV-2 RNA AMPLIFICATION, QUAL    RSV ANTIGEN DETECTION    Narrative:     Specimen Source->Nasopharyngeal Swab          Imaging Results    None          Medications   ibuprofen 20 mg/mL oral liquid 79.6 mg (79.6 mg Oral Given 5/1/24 2100)     Medical Decision Making  13 month old male presents for fussiness and fatigue. Exposure to stomach bug. Appears very comfortable on his father's lap though becomes very irritable on exam which parents report is not unusual. No physical exam abnormalities. He appears well hydrated. Parents would like to obtain viral swabs. Will administer ibuprofen, PO challenge and reassess.     Differential Diagnosis includes, but is not limited to:  Sepsis, bacteremia, UTI, pneumonia, cellulitis, abscess, indwelling line/catheter infection, viral URI, gastroenteritis, viral syndrome, sinusitis, otitis media/externa, neoplasm, drug reaction, serotonin syndrome, intoxication/withdrawal syndrome.     Swabs are negative. No concrete signs of pneumonia or other complications requiring labwork or CXR at this time. No hypoxia, retractions, wheezing, tachypnea. No evidence of bacterial otitis externa/media, pharyngitis, meningitis. Patient drank 16 oz of juice and appeared agitated when the juice ran out. Appears well hydrated. Possible viral syndrome, teething, gas, early developing illness. Stable for discharge with continued supportive care at home. Parents are advised to continue ibuprofen and Tylenol at home as needed for pain/discomfort. Discussed importance of hydration and advancing diet as tolerated. Close PCP follow up is recommended. ED return precautions discussed for uncontrolled fever, altered mental status, signs and symptoms of dehydration, inability to tolerate PO, difficulty breathing. Parents voice understanding and are agreeable to this plan. All questions have been answered.        Problems Addressed:  Fussy baby: acute illness or injury    Amount and/or Complexity of Data Reviewed  Labs:  Decision-making  details documented in ED Course.               ED Course as of 05/02/24 2342   Wed May 01, 2024   1935 SARS-CoV-2 RNA, Amplification, Qual: Negative [CS]   1935 RSV Ag by Molecular Method: Negative [CS]   1949 Influenza B, Molecular: Negative [CS]   1949 Influenza A, Molecular: Negative [CS]   1949 PO challenging patient [CS]   2000 Patient drank two juices. Active in the [email or web address] [CS]      ED Course User Index  [CS] Livia Wolff PA-C                           Clinical Impression:  Final diagnoses:  [R68.12] Fussy baby (Primary)          ED Disposition Condition    Discharge Stable          ED Prescriptions    None       Follow-up Information       Follow up With Specialties Details Why Contact Info    Primary Care Provider  Schedule an appointment as soon as possible for a visit in 2 days               Livia Wolff PA-C  05/02/24 2348

## 2025-01-07 ENCOUNTER — OFFICE VISIT (OUTPATIENT)
Dept: ORTHOPEDIC SURGERY | Age: 55
End: 2025-01-07

## 2025-01-07 ENCOUNTER — APPOINTMENT (OUTPATIENT)
Dept: GENERAL RADIOLOGY | Age: 55
End: 2025-01-07
Payer: MEDICARE

## 2025-01-07 ENCOUNTER — HOSPITAL ENCOUNTER (EMERGENCY)
Age: 55
Discharge: HOME | End: 2025-01-07
Attending: STUDENT IN AN ORGANIZED HEALTH CARE EDUCATION/TRAINING PROGRAM
Payer: MEDICARE

## 2025-01-07 ENCOUNTER — TELEPHONE (OUTPATIENT)
Dept: ORTHOPEDIC SURGERY | Age: 55
End: 2025-01-07

## 2025-01-07 VITALS
SYSTOLIC BLOOD PRESSURE: 136 MMHG | BODY MASS INDEX: 25.2 KG/M2 | OXYGEN SATURATION: 99 % | HEIGHT: 71 IN | RESPIRATION RATE: 11 BRPM | DIASTOLIC BLOOD PRESSURE: 87 MMHG | TEMPERATURE: 98 F | WEIGHT: 180 LBS | HEART RATE: 77 BPM

## 2025-01-07 DIAGNOSIS — S82.891A CLOSED FRACTURE OF RIGHT ANKLE, INITIAL ENCOUNTER: Primary | ICD-10-CM

## 2025-01-07 DIAGNOSIS — S82.851D CLOSED DISP TRIMALLEOLAR FX OF RIGHT LOWER LEG WITH ROUTINE HEALING: Primary | ICD-10-CM

## 2025-01-07 PROBLEM — S82.841A BIMALLEOLAR FRACTURE OF RIGHT ANKLE: Status: ACTIVE | Noted: 2025-01-07

## 2025-01-07 PROCEDURE — 6360000002 HC RX W HCPCS: Performed by: STUDENT IN AN ORGANIZED HEALTH CARE EDUCATION/TRAINING PROGRAM

## 2025-01-07 PROCEDURE — 27810 TREATMENT OF ANKLE FRACTURE: CPT

## 2025-01-07 PROCEDURE — 2580000003 HC RX 258: Performed by: STUDENT IN AN ORGANIZED HEALTH CARE EDUCATION/TRAINING PROGRAM

## 2025-01-07 PROCEDURE — 73610 X-RAY EXAM OF ANKLE: CPT

## 2025-01-07 PROCEDURE — 99285 EMERGENCY DEPT VISIT HI MDM: CPT

## 2025-01-07 RX ORDER — LOSARTAN POTASSIUM 25 MG/1
25 TABLET ORAL DAILY
COMMUNITY
Start: 2024-10-23

## 2025-01-07 RX ORDER — GABAPENTIN 300 MG/1
600 CAPSULE ORAL NIGHTLY
COMMUNITY
Start: 2024-10-23

## 2025-01-07 RX ORDER — HYDROCODONE BITARTRATE AND ACETAMINOPHEN 5; 325 MG/1; MG/1
1 TABLET ORAL EVERY 6 HOURS PRN
Qty: 10 TABLET | Refills: 0 | Status: SHIPPED | OUTPATIENT
Start: 2025-01-07 | End: 2025-01-10

## 2025-01-07 RX ORDER — ASPIRIN 81 MG/1
81 TABLET ORAL 2 TIMES DAILY
Qty: 60 TABLET | Refills: 0 | Status: SHIPPED | OUTPATIENT
Start: 2025-01-07 | End: 2025-02-06

## 2025-01-07 RX ORDER — IBUPROFEN 800 MG/1
800 TABLET, FILM COATED ORAL EVERY 8 HOURS PRN
COMMUNITY
Start: 2024-08-05 | End: 2025-01-08

## 2025-01-07 RX ORDER — INSULIN GLARGINE 100 [IU]/ML
80 INJECTION, SOLUTION SUBCUTANEOUS DAILY
COMMUNITY
Start: 2025-01-07

## 2025-01-07 RX ORDER — HYDROCODONE BITARTRATE AND ACETAMINOPHEN 10; 325 MG/1; MG/1
1 TABLET ORAL EVERY 6 HOURS PRN
Qty: 28 TABLET | Refills: 0 | Status: SHIPPED | OUTPATIENT
Start: 2025-01-07 | End: 2025-02-06

## 2025-01-07 RX ORDER — CLOTRIMAZOLE AND BETAMETHASONE DIPROPIONATE 10; .64 MG/G; MG/G
CREAM TOPICAL AS NEEDED
COMMUNITY
Start: 2024-10-23

## 2025-01-07 RX ORDER — TRAZODONE HYDROCHLORIDE 150 MG/1
150 TABLET ORAL NIGHTLY
COMMUNITY
Start: 2024-10-23

## 2025-01-07 RX ORDER — EZETIMIBE 10 MG/1
10 TABLET ORAL NIGHTLY
COMMUNITY
Start: 2024-10-23

## 2025-01-07 RX ORDER — MUPIROCIN 20 MG/G
OINTMENT TOPICAL PRN
COMMUNITY
Start: 2024-10-25

## 2025-01-07 RX ORDER — 0.9 % SODIUM CHLORIDE 0.9 %
500 INTRAVENOUS SOLUTION INTRAVENOUS
Status: COMPLETED | OUTPATIENT
Start: 2025-01-07 | End: 2025-01-07

## 2025-01-07 RX ADMIN — PROPOFOL 40 MG: 10 INJECTION, EMULSION INTRAVENOUS at 11:12

## 2025-01-07 RX ADMIN — SODIUM CHLORIDE 500 ML: 9 INJECTION, SOLUTION INTRAVENOUS at 11:04

## 2025-01-07 ASSESSMENT — PAIN DESCRIPTION - ORIENTATION
ORIENTATION: RIGHT
ORIENTATION: RIGHT

## 2025-01-07 ASSESSMENT — PAIN SCALES - GENERAL
PAINLEVEL_OUTOF10: 8
PAINLEVEL_OUTOF10: 2

## 2025-01-07 ASSESSMENT — PAIN DESCRIPTION - LOCATION
LOCATION: ANKLE
LOCATION: ANKLE

## 2025-01-07 ASSESSMENT — LIFESTYLE VARIABLES
HOW OFTEN DO YOU HAVE A DRINK CONTAINING ALCOHOL: NEVER
HOW MANY STANDARD DRINKS CONTAINING ALCOHOL DO YOU HAVE ON A TYPICAL DAY: PATIENT DOES NOT DRINK

## 2025-01-07 ASSESSMENT — PAIN - FUNCTIONAL ASSESSMENT: PAIN_FUNCTIONAL_ASSESSMENT: 0-10

## 2025-01-07 NOTE — H&P (VIEW-ONLY)
Name: Koffi Henderson  YOB: 1970  Gender: male  MRN: [id number]    Summary:RightAnkle Fracture: dasha.     Proceed with open reduction internal fixation right ankle    Arthrex tibial left nail    Right popliteal saphenous block-admit postoperatively     CC: Ankle Pain    History of present illness: This is a 54-year-old male who states he fell out of bed this morning awkwardly landing on his right foot.  His wife  approximately 2 weeks ago.  He lives alone at home.  He works as a .  When he fell out of bed he had intense pain in his right ankle and a deformity.  He presented to the ER who diagnosed him with an ankle fracture.  He was placed into a splint and sent to me for follow-up.  He states he lives at home alone with his cats.  He states he cannot be admitted to hospital because he has to care of his cats.  There does appear to be some developmental delays versus speech pathology    ROS/Meds/PSH/PMH/FH/SH: below is tobacco and diabetes.  A full history is at the bottom of the note.   Patient Denies fever/chills, headache, visual changes, chest pain, shortness of breath, and nausea/vomiting/diarrhea   Tobacco:  reports that he has never smoked. He has never used smokeless tobacco.  Diabetes: Diabetic - Insulin dependent    Physical Exam    Gen: AAOx3 NAD  Lower Extremity: edema and ecchymosis noted around the ankle. Edema limits pulse exam but foot is wwp.  Decreased ROM at ankle due to pain and swelling.  EHL/FHL/EDL/FDL intact.  +SILT to t/s/s/dpn/spn intact.    TTP at the Lateral malleolus and Medial malleolus.    No signs of open injury or wounds.   No threatened skin    Neuro:  decreased SILT in entire foot.  [place] Monofilament 5.07 exam altered with decreased plantar sensation in the foot    Vascular: BLE: 2+ DP pulse, toes wwp w/ BCR<2s    Imaging:   Interpretation of imaging and   X-Ray RIGHT Ankle 3 vw (AP/Lateral/Oblique) for ankle pain   Findings: Right

## 2025-01-07 NOTE — ED PROVIDER NOTES
Emergency Department Provider Note       PCP: Unknown, Provider, ANP   Age: 54 y.o.   Sex: male     DISPOSITION Decision To Discharge 01/07/2025 11:23:46 AM    ICD-10-CM    1. Closed fracture of right ankle, initial encounter  S82.891A [facility]          Medical Decision Making     54 male presents Emergency department evaluation of right ankle pain after falling from bed last night and awoke to find his right ankle with pain and swelling.  Unable to ambulate on the ankle.  X-ray obtained shows right ankle fracture.  Good pulses.  On exam patient does have some skin tenting over the medial malleolus.  Patient complains ankle fracture with slight displacement.  Will sedate patient using propofol for reduction and splinting.  Patient consented for procedure.  See procedure note for details.  Reduction successful, patient splinted.  Tolerated well.  Will prescribe Norco for discomfort, will give crutches and did speak with orthopedics, will refer for follow-up.  Return precautions given.  Patient voiced understanding and agreement with this plan.     1 acute complicated illness or injury.  Over the counter drug management performed.  Prescription drug management performed.  Discussion with external consultants.  Shared medical decision making was utilized in creating the patients health plan today.  I independently ordered and reviewed each unique test.    I reviewed external records: provider visit note from PCP.     ED cardiac monitoring rhythm strip was ordered and interpreted:  sinus rhythm, no evidence of an arrhythmia  ST Segments:Nonspecific ST segments - NO STEMI   Rate: 68  I interpreted the X-rays ankle fracture.  Repeat x-ray: Independent interpretation shows Successful reduction with splint in place.      The management of this patient was discussed with an external consultant.            History     54 male presents Emergency department evaluation of right ankle pain after  consent obtained.  Consent given by: patient  Patient understanding: patient states understanding of the procedure being performed  Required items: required blood products, implants, devices, and special equipment available  Patient identity confirmed: verbally with patient  Time out: Immediately prior to procedure a \"time out\" was called to verify the correct patient, procedure, equipment, support staff and site/side marked as required.  Injury location: ankle  Location details: right ankle  Injury type: fracture-dislocation  Fracture type: bimalleolar  Pre-procedure neurovascular assessment: neurovascularly intact  Pre-procedure distal perfusion: normal  Pre-procedure neurological function: normal  Pre-procedure range of motion: reduced    Sedation:  Patient sedated: yes  Sedation type: moderate (conscious) sedation  Sedatives: propofol  Vitals: Vital signs were monitored during sedation.    Manipulation performed: yes  Skeletal traction used: yes  Reduction successful: yes  X-ray confirmed reduction: yes  Immobilization: splint  Splint type: ankle stirrup (ankle stirrup and short leg)  Supplies used: Ortho-Glass (Ortho-Glass, cotton padding, elastic bandage)  Post-procedure neurovascular assessment: post-procedure neurovascularly intact  Post-procedure distal perfusion: normal  Post-procedure neurological function: normal  Post-procedure range of motion: unchanged  Patient tolerance: patient tolerated the procedure well with no immediate complications      Procedural sedation    Date/Time: 1/7/2025 10:48 AM    Performed by: Dakota Mistry DO  Authorized by: Dakota Mistry DO    Consent:     Consent obtained:  Written    Risks, benefits, and alternatives were discussed: yes      Risks discussed:  Allergic reaction, dysrhythmia, inadequate sedation, nausea, vomiting, respiratory compromise necessitating ventilatory assistance and intubation and prolonged hypoxia resulting in organ damage    Alternatives

## 2025-01-07 NOTE — PROGRESS NOTES
Name: Koffi Henderson  YOB: 1970  Gender: male  MRN: [id number]    Summary:RightAnkle Fracture: dasha.     Proceed with open reduction internal fixation right ankle    Arthrex tibial left nail    Right popliteal saphenous block-admit postoperatively     CC: Ankle Pain    History of present illness: This is a 54-year-old male who states he fell out of bed this morning awkwardly landing on his right foot.  His wife  approximately 2 weeks ago.  He lives alone at home.  He works as a .  When he fell out of bed he had intense pain in his right ankle and a deformity.  He presented to the ER who diagnosed him with an ankle fracture.  He was placed into a splint and sent to me for follow-up.  He states he lives at home alone with his cats.  He states he cannot be admitted to hospital because he has to care of his cats.  There does appear to be some developmental delays versus speech pathology    ROS/Meds/PSH/PMH/FH/SH: below is tobacco and diabetes.  A full history is at the bottom of the note.   Patient Denies fever/chills, headache, visual changes, chest pain, shortness of breath, and nausea/vomiting/diarrhea   Tobacco:  reports that he has never smoked. He has never used smokeless tobacco.  Diabetes: Diabetic - Insulin dependent    Physical Exam    Gen: AAOx3 NAD  Lower Extremity: edema and ecchymosis noted around the ankle. Edema limits pulse exam but foot is wwp.  Decreased ROM at ankle due to pain and swelling.  EHL/FHL/EDL/FDL intact.  +SILT to t/s/s/dpn/spn intact.    TTP at the Lateral malleolus and Medial malleolus.    No signs of open injury or wounds.   No threatened skin    Neuro:  decreased SILT in entire foot.  [place] Monofilament 5.07 exam altered with decreased plantar sensation in the foot    Vascular: BLE: 2+ DP pulse, toes wwp w/ BCR<2s    Imaging:   Interpretation of imaging and   X-Ray RIGHT Ankle 3 vw (AP/Lateral/Oblique) for ankle pain   Findings: Right

## 2025-01-07 NOTE — DISCHARGE INSTRUCTIONS
Leave splint in place until seen by orthopedics.  Do not get splint wet.  Ambulate with crutches.  Do not bear weight on the right lower extremity.  Alternate Tylenol and Motrin as needed for discomfort.  Reserve Norco for severe, breakthrough pain.  Use caution as these medications are narcotics and can be sedating.  Do not drive or operate heavy machinery while taking.  Follow-up with orthopedics within 1 week.  Call their office if you not hear from them in 2 business days.  Return to the ER for any worsening or worrisome symptoms.      Sedation for a Medical Procedure: Care Instructions    You were given a sedative medication during your ED visit. While many of the effects will have worn   off before you leave; you may continue to feel some effects for several hours.     Common side effects from sedation include:  Feeling sleepy. (Your doctors and nurses will make sure you are not too sleepy to go home.)  Nausea and vomiting. This usually does not last long.  Feeling tired.    How can you care for yourself at home?  Activity    Don't do anything for 24 hours that requires attention to detail. It takes time for the medicine effects to completely wear off.    Do not make important legal decisions for 24 hours.    Do not sign any legal documents for 24 hours.    Do not drink alcohol today     For your safety, you should not drive or operate heavy machinery for the remainder of the day     Rest when you feel tired. Getting enough sleep will help you recover.     Diet    You can eat your normal diet, unless your doctor gives you other instructions. If your stomach is upset, try clear liquids and bland, low-fat foods like plain toast or rice.     Drink plenty of fluids (unless your doctor tells you not to).     Don't drink alcohol for 24 hours.     Medicines    Be safe with medicines. Read and follow all instructions on the label.  If the doctor gave you a prescription medicine for pain, take it as prescribed.  If you

## 2025-01-07 NOTE — ED NOTES
Patient mobility status  with mild difficulty.     I have reviewed discharge instructions with the patient.  The patient verbalized understanding.    Patient left ED via Discharge Method: ambulatory to Home with Friend.    Opportunity for questions and clarification provided.     Patient given 1 e scripts.            Kojo, Kathryn, RN  01/07/25 9048

## 2025-01-07 NOTE — ED TRIAGE NOTES
Pt arrives via ems from home c/o fall out of bed last night. Pt woke up this morning with R ankle pain and swelling. Denies head strike, loc. Denies neck pain/back pain.

## 2025-01-07 NOTE — ED NOTES
Pt given crutches and educated on proper use. Demonstrated understanding.     Kathryn Varma, RN  01/07/25 5745

## 2025-01-08 NOTE — PERIOP NOTE
Patient verified name and .  Order for consent NOT found in EHR at time of PAT visit. Unable to verify case posting against order; surgery verified by patient.    Type 1B surgery, phone assessment complete.  Orders not received.  Labs per surgeon: none ordered  Labs per anesthesia protocol: SQBS, Hgb s/h for DOS    Patient answered medical/surgical history questions at their best of ability. All prior to admission medications documented in EPIC.    Patient instructed to continue taking all prescription medications up to the day of surgery but to take only the following medications the day of surgery according to anesthesia guidelines with a small sip of water: hydrocodone-acetaminophen if needed- states takes several,  albuterol inhaler if needed, sertraline, metformin, flonase if needed, omeprazole if needed.    Lantus insulin- usual dose 80 units every morning.  Patient instructed to take 805 of the usual dose- 64 units the morning of surgery- anesthesia protocol.      Patient informed that all vitamins and supplements should be held 7 days prior to surgery and NSAIDS (preventative aspirin 81 mg- patient stated he did not receive instructions from Dr. Holm to take aspirin 81 mg)  5 days prior to surgery. Prescription meds to hold: none    Patient instructed on the following:    > Arrive at [facility]PC Entrance, time of arrival to be called the day before by 1700  > NPO after midnight, unless otherwise indicated, including gum, mints, and ice chips  > Please drink 32 ounces of non-caffeinated clear liquids 2 hours prior to your arrival to avoid dehydration.    > Responsible adult must drive patient to the hospital, stay during surgery, and patient will need supervision 24 hours after anesthesia  > Use non moisturizing soap in shower the night before surgery and on the morning of surgery  > All piercings must be removed prior to arrival.    > Leave all valuables (money and jewelry) at home but bring insurance card and

## 2025-01-14 ENCOUNTER — ANESTHESIA EVENT (OUTPATIENT)
Dept: SURGERY | Age: 55
End: 2025-01-14
Payer: MEDICARE

## 2025-01-14 DIAGNOSIS — G89.18 POST-OP PAIN: Primary | ICD-10-CM

## 2025-01-14 RX ORDER — DOCUSATE SODIUM 100 MG/1
100 CAPSULE, LIQUID FILLED ORAL DAILY PRN
Qty: 30 CAPSULE | Refills: 0 | Status: SHIPPED | OUTPATIENT
Start: 2025-01-14

## 2025-01-14 RX ORDER — ONDANSETRON 4 MG/1
4 TABLET, FILM COATED ORAL DAILY PRN
Qty: 30 TABLET | Refills: 0 | Status: SHIPPED | OUTPATIENT
Start: 2025-01-14

## 2025-01-14 RX ORDER — OXYCODONE HYDROCHLORIDE 5 MG/1
5 TABLET ORAL EVERY 6 HOURS PRN
Qty: 20 TABLET | Refills: 0 | Status: SHIPPED | OUTPATIENT
Start: 2025-01-14 | End: 2025-01-19

## 2025-01-14 RX ORDER — ASPIRIN 81 MG/1
81 TABLET ORAL 2 TIMES DAILY
Qty: 21 TABLET | Refills: 0 | Status: SHIPPED | OUTPATIENT
Start: 2025-01-14

## 2025-01-14 NOTE — PERIOP NOTE
Preop department called to notify patient of arrival time for scheduled procedure. Instructions given to   - Arrive at [facility] Entrance [address].  - No solid food after midnight & Please drink 32 ounces of water 2 hours prior to your arrival to avoid dehydration unless otherwise indicated. No gum, mints, or ice chips.   - Have a responsible adult to drive patient to the hospital, stay during surgery, and patient will need supervision 24 hours after anesthesia.   - Use antibacterial soap in shower the night before surgery and on the morning of surgery.       Was patient contacted: yes, pt  Voicemail left: n/a  Numbers contacted: [phone]   Arrival time: 0800  Time to complete 32 ounces of water: 0600

## 2025-01-15 ENCOUNTER — ANESTHESIA (OUTPATIENT)
Dept: SURGERY | Age: 55
End: 2025-01-15
Payer: MEDICARE

## 2025-01-15 ENCOUNTER — APPOINTMENT (OUTPATIENT)
Dept: GENERAL RADIOLOGY | Age: 55
End: 2025-01-15
Attending: ORTHOPAEDIC SURGERY
Payer: MEDICARE

## 2025-01-15 ENCOUNTER — HOSPITAL ENCOUNTER (INPATIENT)
Age: 55
LOS: 2 days | Discharge: HOME HEALTH | End: 2025-01-17
Attending: ORTHOPAEDIC SURGERY | Admitting: ORTHOPAEDIC SURGERY
Payer: MEDICARE

## 2025-01-15 PROBLEM — I50.22 SYSTOLIC CHF, CHRONIC (HCC): Chronic | Status: ACTIVE | Noted: 2019-08-06

## 2025-01-15 PROBLEM — Z98.890 S/P FOOT JOINT SURGERY: Status: ACTIVE | Noted: 2025-01-15

## 2025-01-15 PROBLEM — S82.891S CLOSED RIGHT ANKLE FRACTURE, SEQUELA: Status: ACTIVE | Noted: 2025-01-15

## 2025-01-15 LAB
BASOPHILS # BLD: 0.02 K/UL (ref 0–0.2)
BASOPHILS NFR BLD: 0.2 % (ref 0–2)
CREAT SERPL-MCNC: 0.81 MG/DL (ref 0.8–1.3)
DIFFERENTIAL METHOD BLD: ABNORMAL
EOSINOPHIL # BLD: 0 K/UL (ref 0–0.8)
EOSINOPHIL NFR BLD: 0 % (ref 0.5–7.8)
ERYTHROCYTE [DISTWIDTH] IN BLOOD BY AUTOMATED COUNT: 12.1 % (ref 11.9–14.6)
GLUCOSE BLD STRIP.AUTO-MCNC: 103 MG/DL (ref 65–100)
GLUCOSE BLD STRIP.AUTO-MCNC: 116 MG/DL (ref 65–100)
GLUCOSE BLD STRIP.AUTO-MCNC: 147 MG/DL (ref 65–100)
GLUCOSE BLD STRIP.AUTO-MCNC: 148 MG/DL (ref 65–100)
GLUCOSE BLD STRIP.AUTO-MCNC: 162 MG/DL (ref 65–100)
GLUCOSE BLD STRIP.AUTO-MCNC: 261 MG/DL (ref 65–100)
GLUCOSE BLD STRIP.AUTO-MCNC: 37 MG/DL (ref 65–100)
GLUCOSE BLD STRIP.AUTO-MCNC: 65 MG/DL (ref 65–100)
GLUCOSE BLD STRIP.AUTO-MCNC: 69 MG/DL (ref 65–100)
HCT VFR BLD AUTO: 45.6 % (ref 41.1–50.3)
HGB BLD-MCNC: 14.8 G/DL (ref 13.6–17.2)
IMM GRANULOCYTES # BLD AUTO: 0.04 K/UL (ref 0–0.5)
IMM GRANULOCYTES NFR BLD AUTO: 0.5 % (ref 0–5)
LYMPHOCYTES # BLD: 0.51 K/UL (ref 0.5–4.6)
LYMPHOCYTES NFR BLD: 6.2 % (ref 13–44)
MCH RBC QN AUTO: 29.7 PG (ref 26.1–32.9)
MCHC RBC AUTO-ENTMCNC: 32.5 G/DL (ref 31.4–35)
MCV RBC AUTO: 91.4 FL (ref 82–102)
MONOCYTES # BLD: 0.08 K/UL (ref 0.1–1.3)
MONOCYTES NFR BLD: 1 % (ref 4–12)
NEUTS SEG # BLD: 7.59 K/UL (ref 1.7–8.2)
NEUTS SEG NFR BLD: 92.1 % (ref 43–78)
NRBC # BLD: 0 K/UL (ref 0–0.2)
PLATELET # BLD AUTO: 223 K/UL (ref 150–450)
PMV BLD AUTO: 9.2 FL (ref 9.4–12.3)
RBC # BLD AUTO: 4.99 M/UL (ref 4.23–5.6)
SERVICE CMNT-IMP: ABNORMAL
SERVICE CMNT-IMP: NORMAL
SERVICE CMNT-IMP: NORMAL
WBC # BLD AUTO: 8.2 K/UL (ref 4.3–11.1)

## 2025-01-15 PROCEDURE — 3700000000 HC ANESTHESIA ATTENDED CARE: Performed by: ORTHOPAEDIC SURGERY

## 2025-01-15 PROCEDURE — 6370000000 HC RX 637 (ALT 250 FOR IP): Performed by: ORTHOPAEDIC SURGERY

## 2025-01-15 PROCEDURE — 6370000000 HC RX 637 (ALT 250 FOR IP): Performed by: INTERNAL MEDICINE

## 2025-01-15 PROCEDURE — 27822 TREATMENT OF ANKLE FRACTURE: CPT | Performed by: ORTHOPAEDIC SURGERY

## 2025-01-15 PROCEDURE — 6360000002 HC RX W HCPCS: Performed by: ANESTHESIOLOGY

## 2025-01-15 PROCEDURE — 6360000002 HC RX W HCPCS

## 2025-01-15 PROCEDURE — 82565 ASSAY OF CREATININE: CPT

## 2025-01-15 PROCEDURE — 82962 GLUCOSE BLOOD TEST: CPT

## 2025-01-15 PROCEDURE — 2709999900 HC NON-CHARGEABLE SUPPLY: Performed by: ORTHOPAEDIC SURGERY

## 2025-01-15 PROCEDURE — 6360000002 HC RX W HCPCS: Performed by: PHYSICIAN ASSISTANT

## 2025-01-15 PROCEDURE — 2500000003 HC RX 250 WO HCPCS: Performed by: ORTHOPAEDIC SURGERY

## 2025-01-15 PROCEDURE — 2500000003 HC RX 250 WO HCPCS: Performed by: PHYSICIAN ASSISTANT

## 2025-01-15 PROCEDURE — 1100000000 HC RM PRIVATE

## 2025-01-15 PROCEDURE — 64447 NJX AA&/STRD FEMORAL NRV IMG: CPT | Performed by: ANESTHESIOLOGY

## 2025-01-15 PROCEDURE — 27829 TREAT LOWER LEG JOINT: CPT | Performed by: ORTHOPAEDIC SURGERY

## 2025-01-15 PROCEDURE — 6360000002 HC RX W HCPCS: Performed by: ORTHOPAEDIC SURGERY

## 2025-01-15 PROCEDURE — C1713 ANCHOR/SCREW BN/BN,TIS/BN: HCPCS | Performed by: ORTHOPAEDIC SURGERY

## 2025-01-15 PROCEDURE — 3600000004 HC SURGERY LEVEL 4 BASE: Performed by: ORTHOPAEDIC SURGERY

## 2025-01-15 PROCEDURE — 64445 NJX AA&/STRD SCIATIC NRV IMG: CPT | Performed by: ANESTHESIOLOGY

## 2025-01-15 PROCEDURE — 85025 COMPLETE CBC W/AUTO DIFF WBC: CPT

## 2025-01-15 PROCEDURE — C1769 GUIDE WIRE: HCPCS | Performed by: ORTHOPAEDIC SURGERY

## 2025-01-15 PROCEDURE — 7100000001 HC PACU RECOVERY - ADDTL 15 MIN: Performed by: ORTHOPAEDIC SURGERY

## 2025-01-15 PROCEDURE — 7100000000 HC PACU RECOVERY - FIRST 15 MIN: Performed by: ORTHOPAEDIC SURGERY

## 2025-01-15 PROCEDURE — 0QSG04Z REPOSITION RIGHT TIBIA WITH INTERNAL FIXATION DEVICE, OPEN APPROACH: ICD-10-PCS | Performed by: ORTHOPAEDIC SURGERY

## 2025-01-15 PROCEDURE — 36415 COLL VENOUS BLD VENIPUNCTURE: CPT

## 2025-01-15 PROCEDURE — 3600000014 HC SURGERY LEVEL 4 ADDTL 15MIN: Performed by: ORTHOPAEDIC SURGERY

## 2025-01-15 PROCEDURE — 3700000001 HC ADD 15 MINUTES (ANESTHESIA): Performed by: ORTHOPAEDIC SURGERY

## 2025-01-15 PROCEDURE — 2580000003 HC RX 258

## 2025-01-15 DEVICE — SCREW BNE L40MM DIA3.5MM CORT FT ANK S STL NONLOCKING LO: Type: IMPLANTABLE DEVICE | Site: ANKLE | Status: FUNCTIONAL

## 2025-01-15 DEVICE — IMPLANTABLE DEVICE: Type: IMPLANTABLE DEVICE | Site: ANKLE | Status: FUNCTIONAL

## 2025-01-15 DEVICE — SCREW BNE L14MM DIA3MM CANC S STL NONLOCKING LO PROF FOR: Type: IMPLANTABLE DEVICE | Site: ANKLE | Status: FUNCTIONAL

## 2025-01-15 DEVICE — NAIL IM 3X130 MM RT FIBULAR: Type: IMPLANTABLE DEVICE | Site: ANKLE | Status: FUNCTIONAL

## 2025-01-15 DEVICE — SCREW BNE LP 3.5X56 MM CORTICAL NLCK SS: Type: IMPLANTABLE DEVICE | Site: ANKLE | Status: FUNCTIONAL

## 2025-01-15 RX ORDER — FENTANYL CITRATE 50 UG/ML
100 INJECTION, SOLUTION INTRAMUSCULAR; INTRAVENOUS
Status: COMPLETED | OUTPATIENT
Start: 2025-01-15 | End: 2025-01-15

## 2025-01-15 RX ORDER — LIDOCAINE HYDROCHLORIDE 10 MG/ML
1 INJECTION, SOLUTION INFILTRATION; PERINEURAL
Status: DISCONTINUED | OUTPATIENT
Start: 2025-01-15 | End: 2025-01-15 | Stop reason: HOSPADM

## 2025-01-15 RX ORDER — NALOXONE HYDROCHLORIDE 0.4 MG/ML
INJECTION, SOLUTION INTRAMUSCULAR; INTRAVENOUS; SUBCUTANEOUS PRN
Status: DISCONTINUED | OUTPATIENT
Start: 2025-01-15 | End: 2025-01-15 | Stop reason: HOSPADM

## 2025-01-15 RX ORDER — DEXTROSE MONOHYDRATE 100 MG/ML
INJECTION, SOLUTION INTRAVENOUS
Status: COMPLETED | OUTPATIENT
Start: 2025-01-15 | End: 2025-01-15

## 2025-01-15 RX ORDER — IBUPROFEN 600 MG/1
1 TABLET ORAL PRN
Status: DISCONTINUED | OUTPATIENT
Start: 2025-01-15 | End: 2025-01-17 | Stop reason: HOSPADM

## 2025-01-15 RX ORDER — SODIUM CHLORIDE 0.9 % (FLUSH) 0.9 %
5-40 SYRINGE (ML) INJECTION EVERY 12 HOURS SCHEDULED
Status: DISCONTINUED | OUTPATIENT
Start: 2025-01-15 | End: 2025-01-17 | Stop reason: HOSPADM

## 2025-01-15 RX ORDER — SODIUM CHLORIDE 0.9 % (FLUSH) 0.9 %
5-40 SYRINGE (ML) INJECTION PRN
Status: DISCONTINUED | OUTPATIENT
Start: 2025-01-15 | End: 2025-01-15 | Stop reason: HOSPADM

## 2025-01-15 RX ORDER — POTASSIUM CHLORIDE 29.8 MG/ML
20 INJECTION INTRAVENOUS PRN
Status: DISCONTINUED | OUTPATIENT
Start: 2025-01-15 | End: 2025-01-17 | Stop reason: HOSPADM

## 2025-01-15 RX ORDER — DEXTROSE MONOHYDRATE 100 MG/ML
INJECTION, SOLUTION INTRAVENOUS
Status: COMPLETED
Start: 2025-01-15 | End: 2025-01-15

## 2025-01-15 RX ORDER — DEXAMETHASONE SODIUM PHOSPHATE 4 MG/ML
INJECTION, SOLUTION INTRA-ARTICULAR; INTRALESIONAL; INTRAMUSCULAR; INTRAVENOUS; SOFT TISSUE
Status: DISCONTINUED | OUTPATIENT
Start: 2025-01-15 | End: 2025-01-15 | Stop reason: SDUPTHER

## 2025-01-15 RX ORDER — MIDAZOLAM HYDROCHLORIDE 2 MG/2ML
2 INJECTION, SOLUTION INTRAMUSCULAR; INTRAVENOUS
Status: COMPLETED | OUTPATIENT
Start: 2025-01-15 | End: 2025-01-15

## 2025-01-15 RX ORDER — ACETAMINOPHEN 325 MG/1
650 TABLET ORAL EVERY 6 HOURS
Status: DISCONTINUED | OUTPATIENT
Start: 2025-01-15 | End: 2025-01-17 | Stop reason: HOSPADM

## 2025-01-15 RX ORDER — OXYCODONE HYDROCHLORIDE 5 MG/1
10 TABLET ORAL PRN
Status: DISCONTINUED | OUTPATIENT
Start: 2025-01-15 | End: 2025-01-15 | Stop reason: HOSPADM

## 2025-01-15 RX ORDER — INSULIN LISPRO 100 [IU]/ML
0-10 INJECTION, SOLUTION INTRAVENOUS; SUBCUTANEOUS
Status: DISCONTINUED | OUTPATIENT
Start: 2025-01-15 | End: 2025-01-17 | Stop reason: HOSPADM

## 2025-01-15 RX ORDER — EZETIMIBE 10 MG/1
10 TABLET ORAL NIGHTLY
Status: DISCONTINUED | OUTPATIENT
Start: 2025-01-15 | End: 2025-01-17 | Stop reason: HOSPADM

## 2025-01-15 RX ORDER — POLYETHYLENE GLYCOL 3350 17 G/17G
17 POWDER, FOR SOLUTION ORAL DAILY
Status: DISCONTINUED | OUTPATIENT
Start: 2025-01-15 | End: 2025-01-17 | Stop reason: HOSPADM

## 2025-01-15 RX ORDER — ROPIVACAINE HYDROCHLORIDE 5 MG/ML
INJECTION, SOLUTION EPIDURAL; INFILTRATION; PERINEURAL
Status: COMPLETED | OUTPATIENT
Start: 2025-01-15 | End: 2025-01-15

## 2025-01-15 RX ORDER — INSULIN GLARGINE 100 [IU]/ML
80 INJECTION, SOLUTION SUBCUTANEOUS DAILY
Status: DISCONTINUED | OUTPATIENT
Start: 2025-01-16 | End: 2025-01-16

## 2025-01-15 RX ORDER — OXYCODONE HYDROCHLORIDE 5 MG/1
5 TABLET ORAL PRN
Status: DISCONTINUED | OUTPATIENT
Start: 2025-01-15 | End: 2025-01-15 | Stop reason: HOSPADM

## 2025-01-15 RX ORDER — ONDANSETRON 2 MG/ML
4 INJECTION INTRAMUSCULAR; INTRAVENOUS EVERY 6 HOURS PRN
Status: DISCONTINUED | OUTPATIENT
Start: 2025-01-15 | End: 2025-01-17 | Stop reason: HOSPADM

## 2025-01-15 RX ORDER — MORPHINE SULFATE 4 MG/ML
4 INJECTION, SOLUTION INTRAMUSCULAR; INTRAVENOUS
Status: DISCONTINUED | OUTPATIENT
Start: 2025-01-15 | End: 2025-01-17 | Stop reason: HOSPADM

## 2025-01-15 RX ORDER — ONDANSETRON 2 MG/ML
INJECTION INTRAMUSCULAR; INTRAVENOUS
Status: DISCONTINUED | OUTPATIENT
Start: 2025-01-15 | End: 2025-01-15 | Stop reason: SDUPTHER

## 2025-01-15 RX ORDER — DIPHENHYDRAMINE HYDROCHLORIDE 50 MG/ML
12.5 INJECTION INTRAMUSCULAR; INTRAVENOUS
Status: DISCONTINUED | OUTPATIENT
Start: 2025-01-15 | End: 2025-01-15 | Stop reason: HOSPADM

## 2025-01-15 RX ORDER — ONDANSETRON 4 MG/1
4 TABLET, FILM COATED ORAL DAILY PRN
Status: DISCONTINUED | OUTPATIENT
Start: 2025-01-15 | End: 2025-01-15 | Stop reason: SDUPTHER

## 2025-01-15 RX ORDER — TRAZODONE HYDROCHLORIDE 50 MG/1
150 TABLET, FILM COATED ORAL NIGHTLY
Status: DISCONTINUED | OUTPATIENT
Start: 2025-01-15 | End: 2025-01-17 | Stop reason: HOSPADM

## 2025-01-15 RX ORDER — PROCHLORPERAZINE EDISYLATE 5 MG/ML
5 INJECTION INTRAMUSCULAR; INTRAVENOUS
Status: DISCONTINUED | OUTPATIENT
Start: 2025-01-15 | End: 2025-01-15 | Stop reason: HOSPADM

## 2025-01-15 RX ORDER — GABAPENTIN 300 MG/1
600 CAPSULE ORAL NIGHTLY
Status: DISCONTINUED | OUTPATIENT
Start: 2025-01-15 | End: 2025-01-17 | Stop reason: HOSPADM

## 2025-01-15 RX ORDER — MAGNESIUM SULFATE IN WATER 40 MG/ML
2000 INJECTION, SOLUTION INTRAVENOUS PRN
Status: DISCONTINUED | OUTPATIENT
Start: 2025-01-15 | End: 2025-01-17 | Stop reason: HOSPADM

## 2025-01-15 RX ORDER — ASPIRIN 81 MG/1
81 TABLET ORAL 2 TIMES DAILY
Status: DISCONTINUED | OUTPATIENT
Start: 2025-01-15 | End: 2025-01-17 | Stop reason: HOSPADM

## 2025-01-15 RX ORDER — DEXTROSE MONOHYDRATE 100 MG/ML
INJECTION, SOLUTION INTRAVENOUS CONTINUOUS PRN
Status: DISCONTINUED | OUTPATIENT
Start: 2025-01-15 | End: 2025-01-17 | Stop reason: HOSPADM

## 2025-01-15 RX ORDER — OXYCODONE HYDROCHLORIDE 5 MG/1
10 TABLET ORAL EVERY 4 HOURS PRN
Status: DISCONTINUED | OUTPATIENT
Start: 2025-01-15 | End: 2025-01-17 | Stop reason: HOSPADM

## 2025-01-15 RX ORDER — DEXTROSE MONOHYDRATE 100 MG/ML
INJECTION, SOLUTION INTRAVENOUS CONTINUOUS
Status: DISCONTINUED | OUTPATIENT
Start: 2025-01-15 | End: 2025-01-15

## 2025-01-15 RX ORDER — ONDANSETRON 4 MG/1
4 TABLET, ORALLY DISINTEGRATING ORAL EVERY 8 HOURS PRN
Status: DISCONTINUED | OUTPATIENT
Start: 2025-01-15 | End: 2025-01-17 | Stop reason: HOSPADM

## 2025-01-15 RX ORDER — MORPHINE SULFATE 2 MG/ML
2 INJECTION, SOLUTION INTRAMUSCULAR; INTRAVENOUS
Status: DISCONTINUED | OUTPATIENT
Start: 2025-01-15 | End: 2025-01-17 | Stop reason: HOSPADM

## 2025-01-15 RX ORDER — DEXTROSE MONOHYDRATE 100 MG/ML
INJECTION, SOLUTION INTRAVENOUS CONTINUOUS PRN
Status: DISCONTINUED | OUTPATIENT
Start: 2025-01-15 | End: 2025-01-15

## 2025-01-15 RX ORDER — LOSARTAN POTASSIUM 25 MG/1
25 TABLET ORAL DAILY
Status: DISCONTINUED | OUTPATIENT
Start: 2025-01-16 | End: 2025-01-17 | Stop reason: HOSPADM

## 2025-01-15 RX ORDER — DOCUSATE SODIUM 100 MG/1
100 CAPSULE, LIQUID FILLED ORAL DAILY PRN
Status: DISCONTINUED | OUTPATIENT
Start: 2025-01-15 | End: 2025-01-17 | Stop reason: HOSPADM

## 2025-01-15 RX ORDER — SODIUM CHLORIDE, SODIUM LACTATE, POTASSIUM CHLORIDE, CALCIUM CHLORIDE 600; 310; 30; 20 MG/100ML; MG/100ML; MG/100ML; MG/100ML
INJECTION, SOLUTION INTRAVENOUS CONTINUOUS
Status: DISCONTINUED | OUTPATIENT
Start: 2025-01-15 | End: 2025-01-15 | Stop reason: HOSPADM

## 2025-01-15 RX ORDER — DEXAMETHASONE SODIUM PHOSPHATE 10 MG/ML
INJECTION, SOLUTION INTRAMUSCULAR; INTRAVENOUS
Status: DISCONTINUED | OUTPATIENT
Start: 2025-01-15 | End: 2025-01-15

## 2025-01-15 RX ORDER — SODIUM CHLORIDE 0.9 % (FLUSH) 0.9 %
5-40 SYRINGE (ML) INJECTION EVERY 12 HOURS SCHEDULED
Status: DISCONTINUED | OUTPATIENT
Start: 2025-01-15 | End: 2025-01-15 | Stop reason: HOSPADM

## 2025-01-15 RX ORDER — PROPOFOL 10 MG/ML
INJECTION, EMULSION INTRAVENOUS
Status: DISCONTINUED | OUTPATIENT
Start: 2025-01-15 | End: 2025-01-15 | Stop reason: SDUPTHER

## 2025-01-15 RX ORDER — SODIUM CHLORIDE 9 MG/ML
INJECTION, SOLUTION INTRAVENOUS PRN
Status: DISCONTINUED | OUTPATIENT
Start: 2025-01-15 | End: 2025-01-17 | Stop reason: HOSPADM

## 2025-01-15 RX ORDER — SODIUM CHLORIDE 0.9 % (FLUSH) 0.9 %
5-40 SYRINGE (ML) INJECTION PRN
Status: DISCONTINUED | OUTPATIENT
Start: 2025-01-15 | End: 2025-01-17 | Stop reason: HOSPADM

## 2025-01-15 RX ORDER — ONDANSETRON 2 MG/ML
4 INJECTION INTRAMUSCULAR; INTRAVENOUS
Status: DISCONTINUED | OUTPATIENT
Start: 2025-01-15 | End: 2025-01-15 | Stop reason: HOSPADM

## 2025-01-15 RX ORDER — LIDOCAINE HYDROCHLORIDE 20 MG/ML
INJECTION, SOLUTION EPIDURAL; INFILTRATION; INTRACAUDAL; PERINEURAL
Status: DISCONTINUED | OUTPATIENT
Start: 2025-01-15 | End: 2025-01-15 | Stop reason: SDUPTHER

## 2025-01-15 RX ORDER — OXYCODONE HYDROCHLORIDE 5 MG/1
5 TABLET ORAL EVERY 4 HOURS PRN
Status: DISCONTINUED | OUTPATIENT
Start: 2025-01-15 | End: 2025-01-17 | Stop reason: HOSPADM

## 2025-01-15 RX ORDER — ENOXAPARIN SODIUM 100 MG/ML
40 INJECTION SUBCUTANEOUS DAILY
Status: DISCONTINUED | OUTPATIENT
Start: 2025-01-16 | End: 2025-01-17 | Stop reason: HOSPADM

## 2025-01-15 RX ORDER — SODIUM CHLORIDE 9 MG/ML
INJECTION, SOLUTION INTRAVENOUS PRN
Status: DISCONTINUED | OUTPATIENT
Start: 2025-01-15 | End: 2025-01-15 | Stop reason: HOSPADM

## 2025-01-15 RX ORDER — IBUPROFEN 600 MG/1
1 TABLET ORAL PRN
Status: DISCONTINUED | OUTPATIENT
Start: 2025-01-15 | End: 2025-01-15

## 2025-01-15 RX ORDER — POTASSIUM CHLORIDE 7.45 MG/ML
10 INJECTION INTRAVENOUS PRN
Status: DISCONTINUED | OUTPATIENT
Start: 2025-01-15 | End: 2025-01-17 | Stop reason: HOSPADM

## 2025-01-15 RX ADMIN — MORPHINE SULFATE 2 MG: 2 INJECTION, SOLUTION INTRAMUSCULAR; INTRAVENOUS at 21:02

## 2025-01-15 RX ADMIN — TRAZODONE HYDROCHLORIDE 150 MG: 50 TABLET ORAL at 21:02

## 2025-01-15 RX ADMIN — GABAPENTIN 600 MG: 300 CAPSULE ORAL at 21:02

## 2025-01-15 RX ADMIN — PROPOFOL 100 MCG/KG/MIN: 10 INJECTION, EMULSION INTRAVENOUS at 11:19

## 2025-01-15 RX ADMIN — ROPIVACAINE HYDROCHLORIDE 10 ML: 5 INJECTION, SOLUTION EPIDURAL; INFILTRATION; PERINEURAL at 10:16

## 2025-01-15 RX ADMIN — DEXTROSE: 10 SOLUTION INTRAVENOUS at 13:15

## 2025-01-15 RX ADMIN — INSULIN LISPRO 6 UNITS: 100 INJECTION, SOLUTION INTRAVENOUS; SUBCUTANEOUS at 21:17

## 2025-01-15 RX ADMIN — LIDOCAINE HYDROCHLORIDE 50 MG: 20 INJECTION, SOLUTION EPIDURAL; INFILTRATION; INTRACAUDAL; PERINEURAL at 11:18

## 2025-01-15 RX ADMIN — SODIUM CHLORIDE, PRESERVATIVE FREE 10 ML: 5 INJECTION INTRAVENOUS at 21:09

## 2025-01-15 RX ADMIN — PROPOFOL 50 MG: 10 INJECTION, EMULSION INTRAVENOUS at 11:18

## 2025-01-15 RX ADMIN — ACETAMINOPHEN 650 MG: 325 TABLET ORAL at 23:45

## 2025-01-15 RX ADMIN — MEPIVACAINE HYDROCHLORIDE 18 ML: 15 INJECTION, SOLUTION EPIDURAL; INFILTRATION at 10:10

## 2025-01-15 RX ADMIN — ROPIVACAINE HYDROCHLORIDE 18 ML: 5 INJECTION, SOLUTION EPIDURAL; INFILTRATION; PERINEURAL at 10:10

## 2025-01-15 RX ADMIN — DEXTROSE MONOHYDRATE: 100 INJECTION, SOLUTION INTRAVENOUS at 13:15

## 2025-01-15 RX ADMIN — CEFAZOLIN 2000 MG: 2 INJECTION, POWDER, FOR SOLUTION INTRAMUSCULAR; INTRAVENOUS at 11:20

## 2025-01-15 RX ADMIN — ACETAMINOPHEN 650 MG: 325 TABLET ORAL at 17:36

## 2025-01-15 RX ADMIN — OXYCODONE 10 MG: 5 TABLET ORAL at 23:41

## 2025-01-15 RX ADMIN — ASPIRIN 81 MG: 81 TABLET, COATED ORAL at 21:02

## 2025-01-15 RX ADMIN — DEXAMETHASONE SODIUM PHOSPHATE 10 MG: 4 INJECTION INTRA-ARTICULAR; INTRALESIONAL; INTRAMUSCULAR; INTRAVENOUS; SOFT TISSUE at 11:20

## 2025-01-15 RX ADMIN — FENTANYL CITRATE 100 MCG: 50 INJECTION INTRAMUSCULAR; INTRAVENOUS at 10:10

## 2025-01-15 RX ADMIN — OXYCODONE 10 MG: 5 TABLET ORAL at 17:36

## 2025-01-15 RX ADMIN — DEXTROSE MONOHYDRATE: 100 INJECTION, SOLUTION INTRAVENOUS at 08:59

## 2025-01-15 RX ADMIN — METFORMIN HYDROCHLORIDE 500 MG: 500 TABLET ORAL at 18:27

## 2025-01-15 RX ADMIN — EZETIMIBE 10 MG: 10 TABLET ORAL at 21:02

## 2025-01-15 RX ADMIN — MIDAZOLAM 2 MG: 1 INJECTION INTRAMUSCULAR; INTRAVENOUS at 10:10

## 2025-01-15 RX ADMIN — ONDANSETRON 4 MG: 2 INJECTION INTRAMUSCULAR; INTRAVENOUS at 11:20

## 2025-01-15 RX ADMIN — WATER 2000 MG: 1 INJECTION INTRAMUSCULAR; INTRAVENOUS; SUBCUTANEOUS at 21:10

## 2025-01-15 ASSESSMENT — PAIN DESCRIPTION - LOCATION
LOCATION: ANKLE
LOCATION: ANKLE
LOCATION: LEG
LOCATION: ANKLE

## 2025-01-15 ASSESSMENT — PAIN DESCRIPTION - DESCRIPTORS
DESCRIPTORS: BURNING
DESCRIPTORS: ACHING
DESCRIPTORS: BURNING

## 2025-01-15 ASSESSMENT — PAIN SCALES - GENERAL
PAINLEVEL_OUTOF10: 7
PAINLEVEL_OUTOF10: 10
PAINLEVEL_OUTOF10: 9
PAINLEVEL_OUTOF10: 10
PAINLEVEL_OUTOF10: 7

## 2025-01-15 ASSESSMENT — PAIN DESCRIPTION - ORIENTATION
ORIENTATION: RIGHT

## 2025-01-15 ASSESSMENT — PAIN DESCRIPTION - PAIN TYPE: TYPE: SURGICAL PAIN

## 2025-01-15 ASSESSMENT — PAIN DESCRIPTION - FREQUENCY: FREQUENCY: CONTINUOUS

## 2025-01-15 ASSESSMENT — PAIN - FUNCTIONAL ASSESSMENT
PAIN_FUNCTIONAL_ASSESSMENT: ACTIVITIES ARE NOT PREVENTED
PAIN_FUNCTIONAL_ASSESSMENT: 0-10

## 2025-01-15 ASSESSMENT — PAIN DESCRIPTION - ONSET: ONSET: ON-GOING

## 2025-01-15 NOTE — ANESTHESIA PROCEDURE NOTES
Peripheral Block    Patient location during procedure: procedure area  Reason for block: post-op pain management and at surgeon's request  Start time: 1/15/2025 10:10 AM  End time: 1/15/2025 10:15 AM  Staffing  Performed: anesthesiologist   Anesthesiologist: Benoit Perez IV, MD  Performed by: Benoit Perez IV, MD  Authorized by: Benoit Perez IV, MD    Preanesthetic Checklist  Completed: patient identified, IV checked, site marked, risks and benefits discussed, surgical/procedural consents, pre-op evaluation, timeout performed, anesthesia consent given, oxygen available and monitors applied/VS acknowledged  Peripheral Block   Patient position: supine  Prep: ChloraPrep  Provider prep: mask  Patient monitoring: cardiac monitor, continuous pulse ox, frequent blood pressure checks, IV access, oxygen and responsive to questions  Block type: Sciatic  Popliteal  Laterality: right  Injection technique: single-shot  Guidance: ultrasound guided    Needle   Needle type: insulated echogenic nerve stimulator needle   Needle gauge: 20 G  Needle localization: ultrasound guidance  Needle length: 10 cm  Assessment   Injection assessment: negative aspiration for heme, no paresthesia on injection, local visualized surrounding nerve on ultrasound and no intravascular symptoms  Slow fractionated injection: yes  Hemodynamics: stable  Outcomes: uncomplicated and patient tolerated procedure well    Additional Notes  Active ultrasound used to identify the location of the sciatic nerve in the popliteal fossa and also surrounding structures, and the block was performed using real time ultrasound, and permanent image saved.    Medications Administered  dexAMETHasone (DECADRON) (PF) 10 mg/mL injection - Other   4 mg - 1/15/2025 10:10:00 AM  mepivacaine (CARBOCAINE) injection 1.5% - Perineural   18 mL - 1/15/2025 10:10:00 AM  ropivacaine (NAROPIN) injection 0.5% - Perineural   18 mL - 1/15/2025 10:10:00 AM

## 2025-01-15 NOTE — ANESTHESIA POSTPROCEDURE EVALUATION
Department of Anesthesiology  Postprocedure Note    Patient: Koffi Henderson  MRN: [id number]  YOB: 1970  Date of evaluation: 1/15/2025    Procedure Summary       Date: 01/15/25 Room / Location: [facility] OP OR 02 / [facility]    Anesthesia Start: 1107 Anesthesia Stop: 1239    Procedure: RIGHT ANKLE OPEN REDUCTION INTERNAL FIXATION (Right: Ankle) Diagnosis:       Bimalleolar fracture of right ankle      (Bimalleolar fracture of right ankle [S82.841A])    Surgeons: Joey Holm MD Responsible Provider: Benoit Perez IV, MD    Anesthesia Type: TIVA ASA Status: 3            Anesthesia Type: No value filed.    Rosalie Phase I: Rosalie Score: 10    Rosalie Phase II:      Anesthesia Post Evaluation    Patient location during evaluation: PACU  Patient participation: complete - patient participated  Level of consciousness: awake (AT BASELINE)  Airway patency: patent  Nausea & Vomiting: no nausea and no vomiting  Cardiovascular status: hemodynamically stable  Respiratory status: acceptable, nonlabored ventilation and spontaneous ventilation  Hydration status: euvolemic  Comments: BP (!) 149/83   Pulse 85   Temp 97.3 °F (36.3 °C)   Resp 16   Ht 1.803 m (5' 11\")   Wt 83.5 kg (184 lb)   SpO2 98%   BMI 25.66 kg/m²     Multimodal analgesia pain management approach  Pain management: adequate and satisfactory to patient        No notable events documented.

## 2025-01-15 NOTE — PROGRESS NOTES
4 Eyes Skin Assessment     NAME:  Koffi Henderson  YOB: 1970  MEDICAL RECORD NUMBER:  [id number]    The patient is being assessed for  Admission    I agree that at least one RN has performed a thorough Head to Toe Skin Assessment on the patient. ALL assessment sites listed below have been assessed.      Areas assessed by both nurses:    Head, Face, Ears, Shoulders, Back, Chest, Arms, Elbows, Hands, Sacrum. Buttock, Coccyx, Ischium, and Legs. Feet and Heels        Does the Patient have a Wound? No noted wound(s)       Corey Prevention initiated by RN: Yes  Wound Care Orders initiated by RN: No    Pressure Injury (Stage 3,4, Unstageable, DTI, NWPT, and Complex wounds) if present, place Wound referral order by RN under : No    New Ostomies, if present place, Ostomy referral order under : NO    Nurse 1 eSignature: Electronically signed by RE JIMENEZ RN on 1/15/25 at 4:00 PM EST    **SHARE this note so that the co-signing nurse can place an eSignature**    Nurse 2 eSignature: {Esignature:[id number]}

## 2025-01-15 NOTE — PERIOP NOTE
Pt POC blood sugar 37. Dr. Perez made aware. Verbal order for 250 ml of D10 give. Pt is asymptomatic at this time. Pt also given 8 ounces of apple juice and carmen crackers.

## 2025-01-15 NOTE — ANESTHESIA PROCEDURE NOTES
Peripheral Block    Patient location during procedure: procedure area  Reason for block: post-op pain management and at surgeon's request  Start time: 1/15/2025 10:16 AM  End time: 1/15/2025 10:18 AM  Staffing  Performed: anesthesiologist   Anesthesiologist: Benoit Perez IV, MD  Performed by: Benoit Perez IV, MD  Authorized by: Benoit Perez IV, MD    Preanesthetic Checklist  Completed: patient identified, IV checked, site marked, risks and benefits discussed, surgical/procedural consents, equipment checked, pre-op evaluation, timeout performed, anesthesia consent given, oxygen available and monitors applied/VS acknowledged  Peripheral Block   Patient position: supine  Prep: ChloraPrep  Provider prep: mask  Patient monitoring: cardiac monitor, continuous pulse ox, frequent blood pressure checks, IV access, oxygen and responsive to questions  Block type: Femoral  Adductor canal  Laterality: right  Injection technique: single-shot  Guidance: ultrasound guided    Needle   Needle type: insulated echogenic nerve stimulator needle   Needle gauge: 20 G  Needle localization: ultrasound guidance  Needle length: 10 cm  Assessment   Injection assessment: negative aspiration for heme, no paresthesia on injection, local visualized surrounding nerve on ultrasound and no intravascular symptoms  Paresthesia pain: none  Slow fractionated injection: yes  Hemodynamics: stable  Outcomes: uncomplicated and patient tolerated procedure well    Additional Notes  Real time ultrasound used to identify the adductor canal and surrounding structures.  Ultrasound image saved.    Ropivicaine 0.2% 20ml and 4mg decadron.  Medications Administered  mepivacaine (CARBOCAINE) injection 1.5% - Perineural   10 mL - 1/15/2025 10:16:00 AM  ropivacaine (NAROPIN) injection 0.5% - Perineural   10 mL - 1/15/2025 10:16:00 AM

## 2025-01-15 NOTE — PROGRESS NOTES
TRANSFER - IN REPORT:    Verbal report received from PHILLIP Snowden on Koffi Henderson  being received from [facility] PACU for routine post-op      Report consisted of patient's Situation, Background, Assessment and   Recommendations(SBAR).     Information from the following report(s) Nurse Handoff Report was reviewed with the receiving nurse.    Opportunity for questions and clarification was provided.      Assessment completed upon patient's arrival to unit and care assumed.

## 2025-01-15 NOTE — ANESTHESIA PRE PROCEDURE
Department of Anesthesiology  Preprocedure Note       Name:  Koffi Henderson   Age:  54 y.o.  :  1970                                          MRN:  [id number]         Date:  1/15/2025      Surgeon: Surgeon(s):  Joey Holm MD    Procedure: Procedure(s):  RIGHT ANKLE OPEN REDUCTION INTERNAL FIXATION    Medications prior to admission:   Prior to Admission medications    Medication Sig Start Date End Date Taking? Authorizing Provider   docusate sodium (COLACE) 100 MG capsule Take 1 capsule by mouth daily as needed for Constipation 25  Yes File, SHAHZAD Amin   VITAMIN D PO Take by mouth daily   Yes ProviderMare MD   Ascorbic Acid (VITAMIN C PO) Take by mouth Daily   Yes ProviderMare MD   FOLIC ACID PO Take by mouth Daily   Yes ProviderMare MD   ZINC PO Take by mouth daily   Yes ProviderMare MD   ezetimibe (ZETIA) 10 MG tablet Take 1 tablet by mouth nightly 10/23/24  Yes Mare Morales MD   gabapentin (NEURONTIN) 300 MG capsule Take 2 capsules by mouth nightly. 10/23/24  Yes ProviderMare MD   LANTUS SOLOSTAR 100 UNIT/ML injection pen 80 Units Daily 25  Yes Provider, MD Mare   losartan (COZAAR) 25 MG tablet Take 1 tablet by mouth daily 10/23/24  Yes ProviderMare MD   metFORMIN (GLUCOPHAGE) 500 MG tablet Take 1 tablet by mouth 2 times daily (with meals) BID prn 10/23/24  Yes ProviderMare MD   traZODone (DESYREL) 150 MG tablet Take 1 tablet by mouth nightly 10/23/24  Yes ProviderMare MD   aspirin (ASPIRIN 81) 81 MG EC tablet Take 1 tablet by mouth in the morning and at bedtime  Patient taking differently: Take 1 tablet by mouth in the morning and at bedtime Takes daily pre op 25 Yes Joey Holm MD   albuterol sulfate  (90 Base) MCG/ACT inhaler Inhale 1 puff into the lungs every 4 hours as needed 19  Yes Automatic Reconciliation, Ar   fluticasone (FLONASE) 50 MCG/ACT nasal spray daily as

## 2025-01-15 NOTE — PERIOP NOTE
TRANSFER - OUT REPORT:    Verbal report given to Elvia FISHER on Koffi Henderson  being transferred to [facility] for routine post-op       Report consisted of patient’s Situation, Background, Assessment and   Recommendations(SBAR).     Information from the following report(s) Nurse Handoff Report, Adult Overview, Surgery Report, and Event Log was reviewed with the receiving nurse.    Lines:   Peripheral IV 01/15/25 Right;Ventral Hand (Active)   Site Assessment Clean, dry & intact 01/15/25 1237   Line Status Normal saline locked 01/15/25 1237   Line Care Connections checked and tightened 01/15/25 0922   Phlebitis Assessment No symptoms 01/15/25 1237   Infiltration Assessment 0 01/15/25 1237   Alcohol Cap Used No 01/15/25 1237   Dressing Status Clean, dry & intact 01/15/25 1237   Dressing Type Transparent 01/15/25 1237   Dressing Intervention New 01/15/25 0922        Opportunity for questions and clarification was provided.          VTE prophylaxis orders have been written for Koffi Henderson.    Patient and family given floor number and nurses name.  Family updated re: pt status after security code verified.

## 2025-01-15 NOTE — PROGRESS NOTES
Spiritual Consult for Pre-Surgery Prayer. PT was in bed preparing for surgery.  PT expressed trust in Dameon and comfort in  prayer. PT is Yazidi. CH offered prayer. CH offered additional support if needed.    . HOLA [facility].

## 2025-01-15 NOTE — INTERVAL H&P NOTE
Update History & Physical    The Patient's History and Physical was reviewed   I discussed the surgery and patients medical condition with the patient.  The chart was reviewed with the patient and I examined the patient.    There was no change.  The surgical site was confirmed by the patient and me.    CV: RRR  RESP: CTAB    Plan:  The risk, benefits, expected outcome, and alternative to the recommended procedure have been discussed with the patient.  Patient understands and wants to proceed with the procedure.    Electronically signed by Joey Holm MD on 01/15/25 8:40 AM

## 2025-01-15 NOTE — CONSULTS
[facility] Hospitalist Consult   Admit Date:  1/15/2025  8:28 AM   Name:  Koffi Henderson   Age:  54 y.o.  Sex:  male  :  1970   MRN:  [id number]   Room:  2/    Presenting/Chief Complaint: No chief complaint on file.    Reason(s) for Admission: Bimalleolar fracture of right ankle [S82.841A]  S/P foot joint surgery [Z98.890]  Closed right ankle fracture, sequela [S82.893C]     Hospitalists consulted by Joey Holm MD for: insulin and DM management    History of Presenting Illness:   Koffi Henderson is a 54 y.o. male with history of DM, sCHF, HTN, who was admitted by orthopedics for ORIF of trimalleolar ankle fracture.  Patient was admitted for overnight pain control.  Postoperatively hospitalist were consulted for diabetes management.  Blood glucose was 65 this morning and 37 postoperatively.  According to the MAR patient did not get any medications to raise his blood sugar back up but nursing note indicates 250 mL of D10 was given along with apple juice and carmen crackers.  Subsequent blood sugar was 162.  did get 10 mg of IV Decadron at 11 AM as well.  Patient says he took 80 units of Lantus yesterday morning; takes his Lantus in the morning rather than the evening.  He has lost 60 pounds in about 8 months intentionally but has not changed his insulin regimen.  His last A1c was 6.1 in October and he has lost weight since then.      Assessment & Plan:       Bimalleolar fracture of right ankle    S/P foot joint surgery  -IV morphine  -therapy and activity per ortho      DM (diabetes mellitus) (HCC)  -Hold home Lantus for now.  He definitely needs to reduce his dose at discharge due to his weight loss  -Diabetes management consulted to talk to him in more detail  -Check A1c again.  -Change diet to consistent carb with generous carb allowance  -insulin sliding scale and blood glucose checks      Systolic CHF, chronic (HCC)  -Avoid aggressive IV fluid      HTN (hypertension)  -resume home  (HUMALOG,ADMELOG) injection vial 0-10 Units  0-10 Units SubCUTAneous 4x Daily AC & HS       Signed:  Jacob Talbot MD    Part of this note may have been written by using a voice dictation software.  The note has been proof read but may still contain some grammatical/other typographical errors.

## 2025-01-15 NOTE — OP NOTE
alignment and stability we discussed operative and nonoperative management..  After discussion with the patient we decided to proceed with surgical fixation of the ankle with plates, screws, and wires.     Operation In Detail:  Patient was evaluated in the preoperative area.  The operative extremity was marked by me.  We had a long discussion about the procedure and postoperative protocols.  The patient was then brought back to the operating room suite and placed in the operating room table.  A timeout was taken to identify the patient, procedure being performed, and laterality.  After this the patient was prepped and draped in the normal sterile fashion using a Betadine solution and/or a ChloraPrep solution.  A timeout was then taken to identify the patient his name, date of birth, laterality, and procedure being performed.  We also identified allergies and any concerns about the operation.  Attention was then placed to the operative extremity.    Intravenous antibiotics were given.    The leg was exsanguinated with an Esmarch and a tourniquet was inflated to 250 mmHg.      The medial malleolus FRACTURE was then addressed.  Using a longitudinal incision with a 15 blade knife, a medial incision over the medial mal was made.  The saphenous vein was identified and protected.  Small skin bleeders were cauterized.  We used a knife to expose the fracture, and continued to use the knife as well as curettes to debrided the fracture site.  We removed entrapped deltoid ligament, soft tissue, and bone fragments. The talar dome was examined and no osteochondral fracture was identified.  Using a point to point clamp the fracture was reduced.  Direct visualization and Xray were used to confirm a good reduction.  After reduction was confirmed 2 parallel k wires were placed; starting at the tip of the malleolus and extending into the tibia.   The successful then drilled over using a cannulated drill.  1 46 mm partially threaded  cannulated screw were placed.  The fracture was examined and a good reduction was kept.  I then bent and cut the wire from the second point of fixation    A lateral incision over the fibula was made with a 15 blade knife. The skin was incised, the superficial perineal nerve protected, and this dissection was taken down to the fracture site. The incision was carried distally down to the tip of the fibula. The peroneal tendons behind the fibula were protected. Then using a knife, rongeur, and curette we debrided the fracture site of any soft tissues and bone fragments. After adequate debridement of the fracture, we irrigated the fracture.  Using reduction clamps and K wires the fracture was reduced.  X Rays and direct visualization was used to insure fibular length, alignment and rotation.      This fixation began with the fracture still clamped.  I then made a small incision just of the tibial advance a wire at the tip of the fibula.  AP oblique and lateral fluoroscopy showed good positioning of the wire and good reduction of the fibula.  I then drilled with opening reamer and then a proximal reamer, measured and then inserted a Arthrex fibular nail.  Then using the targeting guide I made 2 incisions distally of the lateral incision of the fibula and placed 2 screws from the lateral to the medial aspect through the distal fibula fracture.  I then helpful traction and dilatation with this.  The ankle was then given an external rotation and a Cotton syndesmosis test on XR.  The syndesmosis was unstable     The syndesmosis was then reduced with a thumb reduction.  Using the targeting guide I was able to reduce the syndesmosis and advanced to fully threaded screws across the syndesmosis to fixate it.    Final XR were taken.  The hardware was placed well. The ankle was reduced.  External rotation stress views, cotton stress view as well as AP, mortise and lateral XR were taken.  The posterior malleolus fracture was small

## 2025-01-16 LAB
ANION GAP SERPL CALC-SCNC: 12 MMOL/L (ref 7–16)
BASOPHILS # BLD: 0.01 K/UL (ref 0–0.2)
BASOPHILS NFR BLD: 0.1 % (ref 0–2)
BUN SERPL-MCNC: 18 MG/DL (ref 6–23)
CALCIUM SERPL-MCNC: 9.4 MG/DL (ref 8.8–10.2)
CHLORIDE SERPL-SCNC: 102 MMOL/L (ref 98–107)
CO2 SERPL-SCNC: 22 MMOL/L (ref 20–29)
CREAT SERPL-MCNC: 0.78 MG/DL (ref 0.8–1.3)
DIFFERENTIAL METHOD BLD: ABNORMAL
EOSINOPHIL # BLD: 0 K/UL (ref 0–0.8)
EOSINOPHIL NFR BLD: 0 % (ref 0.5–7.8)
ERYTHROCYTE [DISTWIDTH] IN BLOOD BY AUTOMATED COUNT: 12 % (ref 11.9–14.6)
EST. AVERAGE GLUCOSE BLD GHB EST-MCNC: 129 MG/DL
GLUCOSE BLD STRIP.AUTO-MCNC: 113 MG/DL (ref 65–100)
GLUCOSE BLD STRIP.AUTO-MCNC: 142 MG/DL (ref 65–100)
GLUCOSE BLD STRIP.AUTO-MCNC: 179 MG/DL (ref 65–100)
GLUCOSE BLD STRIP.AUTO-MCNC: 194 MG/DL (ref 65–100)
GLUCOSE SERPL-MCNC: 103 MG/DL (ref 70–99)
HBA1C MFR BLD: 6.1 % (ref 0–5.6)
HCT VFR BLD AUTO: 39.8 % (ref 41.1–50.3)
HGB BLD-MCNC: 13.3 G/DL (ref 13.6–17.2)
IMM GRANULOCYTES # BLD AUTO: 0.03 K/UL (ref 0–0.5)
IMM GRANULOCYTES NFR BLD AUTO: 0.3 % (ref 0–5)
LYMPHOCYTES # BLD: 1.17 K/UL (ref 0.5–4.6)
LYMPHOCYTES NFR BLD: 12.9 % (ref 13–44)
MCH RBC QN AUTO: 29.8 PG (ref 26.1–32.9)
MCHC RBC AUTO-ENTMCNC: 33.4 G/DL (ref 31.4–35)
MCV RBC AUTO: 89 FL (ref 82–102)
MONOCYTES # BLD: 0.64 K/UL (ref 0.1–1.3)
MONOCYTES NFR BLD: 7.1 % (ref 4–12)
NEUTS SEG # BLD: 7.22 K/UL (ref 1.7–8.2)
NEUTS SEG NFR BLD: 79.6 % (ref 43–78)
NRBC # BLD: 0 K/UL (ref 0–0.2)
PLATELET # BLD AUTO: 250 K/UL (ref 150–450)
PMV BLD AUTO: 9.6 FL (ref 9.4–12.3)
POTASSIUM SERPL-SCNC: 4.5 MMOL/L (ref 3.5–5.1)
RBC # BLD AUTO: 4.47 M/UL (ref 4.23–5.6)
SERVICE CMNT-IMP: ABNORMAL
SODIUM SERPL-SCNC: 137 MMOL/L (ref 136–145)
WBC # BLD AUTO: 9.1 K/UL (ref 4.3–11.1)

## 2025-01-16 PROCEDURE — 2500000003 HC RX 250 WO HCPCS: Performed by: ORTHOPAEDIC SURGERY

## 2025-01-16 PROCEDURE — 6360000002 HC RX W HCPCS: Performed by: ORTHOPAEDIC SURGERY

## 2025-01-16 PROCEDURE — 80048 BASIC METABOLIC PNL TOTAL CA: CPT

## 2025-01-16 PROCEDURE — 97112 NEUROMUSCULAR REEDUCATION: CPT

## 2025-01-16 PROCEDURE — 97165 OT EVAL LOW COMPLEX 30 MIN: CPT

## 2025-01-16 PROCEDURE — 1100000000 HC RM PRIVATE

## 2025-01-16 PROCEDURE — 85025 COMPLETE CBC W/AUTO DIFF WBC: CPT

## 2025-01-16 PROCEDURE — 82962 GLUCOSE BLOOD TEST: CPT

## 2025-01-16 PROCEDURE — 97535 SELF CARE MNGMENT TRAINING: CPT

## 2025-01-16 PROCEDURE — 97530 THERAPEUTIC ACTIVITIES: CPT

## 2025-01-16 PROCEDURE — 36415 COLL VENOUS BLD VENIPUNCTURE: CPT

## 2025-01-16 PROCEDURE — 6370000000 HC RX 637 (ALT 250 FOR IP): Performed by: HOSPITALIST

## 2025-01-16 PROCEDURE — 83036 HEMOGLOBIN GLYCOSYLATED A1C: CPT

## 2025-01-16 PROCEDURE — 97161 PT EVAL LOW COMPLEX 20 MIN: CPT

## 2025-01-16 PROCEDURE — 6370000000 HC RX 637 (ALT 250 FOR IP): Performed by: ORTHOPAEDIC SURGERY

## 2025-01-16 RX ORDER — INSULIN GLARGINE 100 [IU]/ML
5 INJECTION, SOLUTION SUBCUTANEOUS NIGHTLY
Status: DISCONTINUED | OUTPATIENT
Start: 2025-01-16 | End: 2025-01-17 | Stop reason: HOSPADM

## 2025-01-16 RX ADMIN — ACETAMINOPHEN 650 MG: 325 TABLET ORAL at 21:15

## 2025-01-16 RX ADMIN — TRAZODONE HYDROCHLORIDE 150 MG: 50 TABLET ORAL at 21:15

## 2025-01-16 RX ADMIN — SERTRALINE HYDROCHLORIDE 50 MG: 50 TABLET ORAL at 09:36

## 2025-01-16 RX ADMIN — SODIUM CHLORIDE, PRESERVATIVE FREE 10 ML: 5 INJECTION INTRAVENOUS at 09:37

## 2025-01-16 RX ADMIN — LOSARTAN POTASSIUM 25 MG: 25 TABLET, FILM COATED ORAL at 09:36

## 2025-01-16 RX ADMIN — OXYCODONE 10 MG: 5 TABLET ORAL at 05:24

## 2025-01-16 RX ADMIN — OXYCODONE 10 MG: 5 TABLET ORAL at 13:46

## 2025-01-16 RX ADMIN — WATER 2000 MG: 1 INJECTION INTRAMUSCULAR; INTRAVENOUS; SUBCUTANEOUS at 05:22

## 2025-01-16 RX ADMIN — ACETAMINOPHEN 650 MG: 325 TABLET ORAL at 17:22

## 2025-01-16 RX ADMIN — ENOXAPARIN SODIUM 40 MG: 100 INJECTION SUBCUTANEOUS at 09:35

## 2025-01-16 RX ADMIN — ASPIRIN 81 MG: 81 TABLET, COATED ORAL at 21:15

## 2025-01-16 RX ADMIN — INSULIN GLARGINE 5 UNITS: 100 INJECTION, SOLUTION SUBCUTANEOUS at 21:16

## 2025-01-16 RX ADMIN — EZETIMIBE 10 MG: 10 TABLET ORAL at 21:15

## 2025-01-16 RX ADMIN — SODIUM CHLORIDE, PRESERVATIVE FREE 5 ML: 5 INJECTION INTRAVENOUS at 21:19

## 2025-01-16 RX ADMIN — ASPIRIN 81 MG: 81 TABLET, COATED ORAL at 09:36

## 2025-01-16 RX ADMIN — ACETAMINOPHEN 650 MG: 325 TABLET ORAL at 11:51

## 2025-01-16 RX ADMIN — GABAPENTIN 600 MG: 300 CAPSULE ORAL at 21:15

## 2025-01-16 RX ADMIN — OXYCODONE 10 MG: 5 TABLET ORAL at 09:36

## 2025-01-16 RX ADMIN — OXYCODONE 10 MG: 5 TABLET ORAL at 23:45

## 2025-01-16 RX ADMIN — OXYCODONE 10 MG: 5 TABLET ORAL at 18:13

## 2025-01-16 RX ADMIN — ACETAMINOPHEN 650 MG: 325 TABLET ORAL at 05:22

## 2025-01-16 ASSESSMENT — PAIN DESCRIPTION - LOCATION
LOCATION: ANKLE
LOCATION: LEG

## 2025-01-16 ASSESSMENT — PAIN DESCRIPTION - PAIN TYPE
TYPE: SURGICAL PAIN
TYPE: SURGICAL PAIN

## 2025-01-16 ASSESSMENT — PAIN SCALES - GENERAL
PAINLEVEL_OUTOF10: 10
PAINLEVEL_OUTOF10: 9
PAINLEVEL_OUTOF10: 10
PAINLEVEL_OUTOF10: 9
PAINLEVEL_OUTOF10: 6
PAINLEVEL_OUTOF10: 10
PAINLEVEL_OUTOF10: 6
PAINLEVEL_OUTOF10: 0
PAINLEVEL_OUTOF10: 10
PAINLEVEL_OUTOF10: 7
PAINLEVEL_OUTOF10: 8

## 2025-01-16 ASSESSMENT — PAIN - FUNCTIONAL ASSESSMENT
PAIN_FUNCTIONAL_ASSESSMENT: ACTIVITIES ARE NOT PREVENTED

## 2025-01-16 ASSESSMENT — PAIN DESCRIPTION - DESCRIPTORS
DESCRIPTORS: ACHING
DESCRIPTORS: BURNING

## 2025-01-16 ASSESSMENT — PAIN DESCRIPTION - ORIENTATION
ORIENTATION: RIGHT

## 2025-01-16 ASSESSMENT — PAIN DESCRIPTION - FREQUENCY
FREQUENCY: CONTINUOUS
FREQUENCY: CONTINUOUS

## 2025-01-16 ASSESSMENT — PAIN DESCRIPTION - ONSET
ONSET: ON-GOING
ONSET: ON-GOING

## 2025-01-16 NOTE — CARE COORDINATION
Chart reviewed by  for potential transition of care (TIFFANY) needs or concerns. Pt is insured with pharmacy benefits and is established with a PCP.  Therapy evals complete with the recommendation for HH therapy at [facility].  No DME recommendations.  CM attempted to meet with pt to discuss this recommendation but pt was sleeping and no one at the bedside.  CM will follow up with pt at a later time and proceed with a HH referral if pt is agreeable. No other TIFFANY needs or concerns identified or reported at present.  Please notify/consult  if other TIFFANY needs arise.       01/16/25 1343   Service Assessment   Patient Orientation Alert and Oriented;Other (see comment)  (sleeping)   Cognition Other (see comment)  (sleeping)   History Provided By Patient;Medical Record   Primary Caregiver Self   Support Systems Friends/Neighbors   Patient's Healthcare Decision Maker is: Legal Next of Kin   PCP Verified by CM Yes   Last Visit to PCP Within last 3 months  (10/23/2024)   Prior Functional Level Independent in ADLs/IADLs   Current Functional Level Assistance with the following:;Mobility   Can patient return to prior living arrangement Yes   Ability to make needs known: Good   Family able to assist with home care needs: No   Would you like for me to discuss the discharge plan with any other family members/significant others, and if so, who? No   Financial Resources Medicare;Medicaid   Community Resources None   Social/Functional History   Lives With Alone   Type of Home House   Home Layout One level   Home Access Stairs to enter without rails   Entrance Stairs - Number of Steps 1   Bathroom Shower/Tub Tub/Shower unit   Bathroom Toilet Handicap height   Bathroom Equipment Tub transfer bench;Grab bars around toilet   Bathroom Accessibility Accessible   Home Equipment Crutches;Grab bars;Wheelchair - Manual   Receives Help From Friend(s)   Prior Level of Assist for ADLs Independent   Prior Level of Assist for Homemaking

## 2025-01-16 NOTE — PROGRESS NOTES
ACUTE PHYSICAL THERAPY GOALS:   (Developed with and agreed upon by patient and/or caregiver.)  Pt will perform sit-to-stand/ stand-to-sit transfers Anna with LRAD while maintaining NWB precautions in 7 therapy sessions.  Pt will ambulate across 250 ft Anna with LRAD, good adherance to NWB precautions, and breaks as needed in 7 therapy sessions.  Pt will tolerate multiple sets and reps of BLE exercises in 7 therapy sessions.  Pt will verbalize 3x fall prevention strategies to implement when returning home within 7 therapy sessions.      PHYSICAL THERAPY Initial Assessment and AM  (Link to Caseload Tracking: PT Visit Days : 1  Acknowledge Orders  Time In/Out  PT Charge Capture  Rehab Caseload Tracker    NWB RLE    Koffi Henderson is a 54 y.o. male   PRIMARY DIAGNOSIS: Bimalleolar fracture of right ankle  Bimalleolar fracture of right ankle [S82.841A]  S/P foot joint surgery [Z98.890]  Closed right ankle fracture, sequela [S82.891S]  Procedure(s) (LRB):  RIGHT ANKLE OPEN REDUCTION INTERNAL FIXATION (Right)  1 Day Post-Op  Reason for Referral: Other abnormalities of gait and mobility (R26.89)  Inpatient: Payor: [facility] MEDICARE / Plan: [facility] MEDICARE COMPLETE / Product Type: *No Product type* /     ASSESSMENT:     REHAB RECOMMENDATIONS:   Recommendation to date pending progress:  Setting:  Home Health Therapy    Equipment:    To Be Determined - pt owns a pair of crutches     ASSESSMENT:  Mr. Henderson is a 54 y.o. male presenting to PT POD #1 from the above procedure. Per ortho, pt is NWB RLE. At baseline, pt ambulates independently and lives alone in a single story home with 1 SHIRA. At time of initial evaluation, pt presents below baseline with deficits in transfers, balance, gait and activity tolerance limiting overall functional mobility. Pt moves from supine to seated EOB with SBA, with good seated balance once upright. During a quick SPT with crutches and CGA, pt presents with decreased coreen and 1x posterior LOB,

## 2025-01-16 NOTE — PLAN OF CARE
Problem: Pain  Goal: Verbalizes/displays adequate comfort level or baseline comfort level  1/16/2025 0201 by Amarilis Gómez, RN  Outcome: Progressing  1/15/2025 1601 by Kortney Bob, RN  Outcome: Progressing     Problem: Chronic Conditions and Co-morbidities  Goal: Patient's chronic conditions and co-morbidity symptoms are monitored and maintained or improved  Outcome: Progressing     Problem: Discharge Planning  Goal: Discharge to home or other facility with appropriate resources  Outcome: Progressing     Problem: ABCDS Injury Assessment  Goal: Absence of physical injury  Outcome: Progressing     Problem: Safety - Adult  Goal: Free from fall injury  Outcome: Progressing

## 2025-01-16 NOTE — PROGRESS NOTES
Patient is a 54-year-old male with history of DM2, systolic CHF, hypertension, status post ORIF for trimalleolar ankle fracture, POD #1.  Hospitalist team was consulted yesterday in view of hypoglycemia with blood glucose of 65 and 37 yesterday.  Patient's A1c is 6.1, of normally takes Lantus of 80 units subcu daily.  Patient's blood glucose now has been in normal range.  Decrease Lantus to 5 units SQ nightly for tonight, will continue to monitor POC glucose QA CHS.  Patient not billed for this visit.

## 2025-01-16 NOTE — PROGRESS NOTES
upper body clothing?: A Little  How much help is needed for taking care of personal grooming?: A Little  How much help for eating meals?: None  AM-PAC Inpatient Daily Activity Raw Score: 19  AM-PAC Inpatient ADL T-Scale Score : 40.22  ADL Inpatient CMS 0-100% Score: 42.8  ADL Inpatient CMS G-Code Modifier : CK        SUBJECTIVE:     Mr. Henderson states, \"I don't want to go home today.\"     Social/Functional Lives With: Friend(s)  Type of Home: House  Home Layout: One level  Home Access: Stairs to enter without rails  Entrance Stairs - Number of Steps: 1  Bathroom Shower/Tub: Tub/Shower unit  Bathroom Toilet: Handicap height  Bathroom Equipment: Tub transfer bench, Grab bars around toilet  Bathroom Accessibility: Accessible  Home Equipment: Crutches, Grab bars, Wheelchair - Manual  Has the patient had two or more falls in the past year or any fall with injury in the past year?: No  Receives Help From: Friend(s)  Prior Level of Assist for ADLs: Independent  Prior Level of Assist for Homemaking: Independent  Prior Level of Assist for Ambulation: Independent household ambulator, with or without device  Prior Level of Assist for Transfers: Independent  Active : Yes  Mode of Transportation: Car  Occupation: Part time employment  Type of Occupation:     OBJECTIVE:     LINES / DRAINS / AIRWAY: None    RESTRICTIONS/PRECAUTIONS:  Restrictions/Precautions: Weight Bearing  Right Lower Extremity Weight Bearing: Non Weight Bearing    PAIN: VITALS / O2:   Pre Treatment:   Pain Assessment: 0-10  Pain Level: 9  R foot    Post Treatment: unchanged       Vitals stable         Oxygen  room air            GROSS EVALUATION: INTACT IMPAIRED   (See Comments)   UE AROM [x] []B UEs   UE PROM [] []   Strength [x]       Posture / Balance []  Rounded shoulders, forward head, good sitting, fair standing   Sensation [x]  B UEs   Coordination [x]  B UEs     Tone []       Edema []    Activity Tolerance []  Pain limiting some  tasks     Hand Dominance R [x] L []      COGNITION/  PERCEPTION: INTACT IMPAIRED   (See Comments)   Orientation [x]  x4   Vision [x]     Hearing [x]     Cognition  []  Altered but BL since birth   Perception []  Cues to slow down and for safety     MOBILITY: I Mod I S SBA CGA Min Mod Max Total  NT x2 Comments:   Bed Mobility    Rolling [] [] [] [] [] [] [] [] [] [x] []    Supine to Sit [] [] [] [x] [] [] [] [] [] [] []    Scooting [] [] [] [x] [] [] [] [] [] [] []    Sit to Supine [] [] [] [x] [] [] [] [] [] [] []    Transfers    Sit to Stand [] [] [] [] [x] [x] [] [] [] [] []    Bed to Chair [] [] [] [] [x] [x] [] [] [] [] []    Stand to Sit [] [] [] [] [x] [x] [] [] [] [] []    Tub/Shower [] [] [] [] [] [] [] [] [] [x] []     Toilet [] [] [] [] [] [x] [] [] [] [] []      [] [] [] [] [] [] [] [] [] [] []    I=Independent, Mod I=Modified Independent, S=Supervision/Setup, SBA=Standby Assistance, CGA=Contact Guard Assistance, Min=Minimal Assistance, Mod=Moderate Assistance, Max=Maximal Assistance, Total=Total Assistance, NT=Not Tested    ACTIVITIES OF DAILY LIVING: I Mod I S SBA CGA Min Mod Max Total NT Comments   BASIC ADLs:              Upper Body Bathing  [] [] [] [] [] [] [] [] [] [x]  declined   Lower Body Bathing [] [] [] [] [] [] [] [] [] [x]     Toileting [] [] [] [] [] [x] [] [] [] [] Using urinal   Upper Body Dressing [] [] [] [] [x] [] [] [] [] [] gown   Lower Body Dressing [] [] [] [] [] [x] [] [] [] [] Sock on L foot   Feeding [] [] [x] [] [] [] [] [] [] []    Grooming [] [] [] [] [x] [] [] [] [] [] At sink in standing, LOB and messy spitting into sink while standing on crutches   Personal Device Care [] [] [] [] [] [] [] [] [] [x]    Functional Mobility [] [] [] [] [x] [x] [] [] [] [] With crutches   I=Independent, Mod I=Modified Independent, S=Supervision/Setup, SBA=Standby Assistance, CGA=Contact Guard Assistance, Min=Minimal Assistance, Mod=Moderate Assistance, Max=Maximal Assistance, Total=Total

## 2025-01-16 NOTE — DIABETES MGMT
Patient seen for assessment regarding diabetes management by diabetes educator. Admitting blood glucose 65. A1c 6.1 (eAG 129). Patient has a past medical history of CHF, DM, HTN, HLD, PE. Patient states they have been living with diabetes for more than 5 years and voices positive family history of diabetes. Patient states they do have a working glucometer with supplies at home. Per patient they typically check blood glucose levels twice daily. Per patient their blood glucose levels have been running 120-180 at home. Patient states they are currently taking Humalog only if blood glucose is \"high\" and Lantus 80 units daily at home for management of diabetes. Patient voices that they have experienced hypoglycemia in the past. Note patient blood glucose on admission 65 and 37 after procedure yesterday.  Patient states took Lantus 80 units the morning of procedure.  Educated regarding hypoglycemia signs, symptoms, and treatment. Patient states has some intentional weight loss.  Reviewed blood glucose over past 24 hours () and current diabetes medication regimen (Humalog correctional insulin). Patient states has not attended formal diabetes education in the past. Patient reports no difficulty with affording their diabetic supplies. Patient states PCP is Phu Handley. Educated patient regarding the benefits of physical activity (as cleared by provider) on glycemic control. Also explained the relationship between hyperglycemia and infection and delayed healing. Discussed target goals for blood glucose and A1C.  Patient verbalizes understanding of teaching.  Encouraged patient to continue to work on lifestyle modifications and to follow up with primary care provider if needed for further titration of regimen. Patient verbalized understanding and voices no further questions regarding diabetes management.

## 2025-01-16 NOTE — PROGRESS NOTES
[facility]        2025         Post Op day: 1 Day Post-Op Procedure(s) (LRB):  RIGHT ANKLE OPEN REDUCTION INTERNAL FIXATION (Right)      Admit Date: 1/15/2025  Admit Diagnosis: Bimalleolar fracture of right ankle [S82.841A]  S/P foot joint surgery [Z98.890]  Closed right ankle fracture, sequela [S82.891S]       Principle Problem: Bimalleolar fracture of right ankle.         Subjective: Doing well, having some pain this morning , No SOB, No Chest Pain, No Nausea or Vomiting     Objective:   Vital Signs are Stable, No Acute Distress, Alert,  splint c/d/i,  Neurovascular exam is normal.     Assessment / Plan :  Patient Active Problem List   Diagnosis    Sepsis due to methicillin susceptible Staphylococcus aureus (HCC)    Acute pyelonephritis    Systolic CHF, chronic (HCC)    Acute septic pulmonary embolism without acute cor pulmonale (HCC)    HLD (hyperlipidemia)    Low back pain    Esophageal reflux    DM (diabetes mellitus) (HCC)    HTN (hypertension)    Allergic rhinitis    Bacteremia due to methicillin susceptible Staphylococcus aureus (MSSA)    Pulmonary cavitary lesion    Tendinitis of right triceps    Olecranon bursitis of right elbow    Closed disp trimalleolar fx of right lower leg with routine healing    Bimalleolar fracture of right ankle    S/P foot joint surgery    Closed right ankle fracture, sequela    Patient Vitals for the past 8 hrs:   BP Temp Temp src Pulse Resp SpO2   25 0936 -- -- -- -- 14 --   25 0934 128/74 -- -- 76 -- --   25 0700 (!) 95/59 97.3 °F (36.3 °C) Axillary 81 16 96 %   25 0554 -- -- -- -- 16 --    Temp (24hrs), Av.5 °F (36.4 °C), Min:97.3 °F (36.3 °C), Max:97.8 °F (36.6 °C)    Body mass index is 25.66 kg/m².    Lab Results   Component Value Date/Time    HGB 13.3 2025 05:04 AM          S/P Procedure(s) (LRB):  RIGHT ANKLE OPEN REDUCTION INTERNAL FIXATION (Right)      Vit D 2000iu daily x 12 weeks   Medical Mgmt per  hospitalist  Anticoagulation plan: lovenox in house- ASA 81 mg BID upon discharge  Continue PT- NWB RLE  Dressing change: keep splint on until first follow up  Fall Precautions  DC disp: home with home health-likely tomorrow  F/U: 2 weeks postop for wound check and staple removal        Signed By: SHAHZAD Pisano  1/16/2025,  12:54 PM

## 2025-01-17 ENCOUNTER — TELEPHONE (OUTPATIENT)
Dept: ORTHOPEDIC SURGERY | Age: 55
End: 2025-01-17

## 2025-01-17 VITALS
DIASTOLIC BLOOD PRESSURE: 67 MMHG | OXYGEN SATURATION: 95 % | BODY MASS INDEX: 25.76 KG/M2 | HEIGHT: 71 IN | WEIGHT: 184 LBS | RESPIRATION RATE: 14 BRPM | SYSTOLIC BLOOD PRESSURE: 108 MMHG | TEMPERATURE: 97.7 F | HEART RATE: 76 BPM

## 2025-01-17 LAB
ANION GAP SERPL CALC-SCNC: 11 MMOL/L (ref 7–16)
BUN SERPL-MCNC: 21 MG/DL (ref 6–23)
CALCIUM SERPL-MCNC: 9.1 MG/DL (ref 8.8–10.2)
CHLORIDE SERPL-SCNC: 103 MMOL/L (ref 98–107)
CO2 SERPL-SCNC: 23 MMOL/L (ref 20–29)
CREAT SERPL-MCNC: 0.91 MG/DL (ref 0.8–1.3)
ERYTHROCYTE [DISTWIDTH] IN BLOOD BY AUTOMATED COUNT: 12.2 % (ref 11.9–14.6)
GLUCOSE BLD STRIP.AUTO-MCNC: 109 MG/DL (ref 65–100)
GLUCOSE BLD STRIP.AUTO-MCNC: 96 MG/DL (ref 65–100)
GLUCOSE SERPL-MCNC: 88 MG/DL (ref 70–99)
HCT VFR BLD AUTO: 38.7 % (ref 41.1–50.3)
HGB BLD-MCNC: 12.8 G/DL (ref 13.6–17.2)
MCH RBC QN AUTO: 30 PG (ref 26.1–32.9)
MCHC RBC AUTO-ENTMCNC: 33.1 G/DL (ref 31.4–35)
MCV RBC AUTO: 90.6 FL (ref 82–102)
NRBC # BLD: 0 K/UL (ref 0–0.2)
PLATELET # BLD AUTO: 203 K/UL (ref 150–450)
PMV BLD AUTO: 9.6 FL (ref 9.4–12.3)
POTASSIUM SERPL-SCNC: 4.1 MMOL/L (ref 3.5–5.1)
RBC # BLD AUTO: 4.27 M/UL (ref 4.23–5.6)
SERVICE CMNT-IMP: ABNORMAL
SERVICE CMNT-IMP: NORMAL
SODIUM SERPL-SCNC: 137 MMOL/L (ref 136–145)
WBC # BLD AUTO: 5 K/UL (ref 4.3–11.1)

## 2025-01-17 PROCEDURE — 80048 BASIC METABOLIC PNL TOTAL CA: CPT

## 2025-01-17 PROCEDURE — 6370000000 HC RX 637 (ALT 250 FOR IP): Performed by: ORTHOPAEDIC SURGERY

## 2025-01-17 PROCEDURE — 85027 COMPLETE CBC AUTOMATED: CPT

## 2025-01-17 PROCEDURE — 82962 GLUCOSE BLOOD TEST: CPT

## 2025-01-17 PROCEDURE — 97535 SELF CARE MNGMENT TRAINING: CPT

## 2025-01-17 PROCEDURE — 36415 COLL VENOUS BLD VENIPUNCTURE: CPT

## 2025-01-17 PROCEDURE — 6360000002 HC RX W HCPCS: Performed by: ORTHOPAEDIC SURGERY

## 2025-01-17 PROCEDURE — 2500000003 HC RX 250 WO HCPCS: Performed by: ORTHOPAEDIC SURGERY

## 2025-01-17 RX ORDER — INSULIN GLARGINE 100 [IU]/ML
5 INJECTION, SOLUTION SUBCUTANEOUS DAILY
Qty: 5 ADJUSTABLE DOSE PRE-FILLED PEN SYRINGE | Refills: 3
Start: 2025-01-17

## 2025-01-17 RX ADMIN — SERTRALINE HYDROCHLORIDE 50 MG: 50 TABLET ORAL at 08:42

## 2025-01-17 RX ADMIN — ACETAMINOPHEN 650 MG: 325 TABLET ORAL at 06:07

## 2025-01-17 RX ADMIN — ASPIRIN 81 MG: 81 TABLET, COATED ORAL at 08:41

## 2025-01-17 RX ADMIN — ENOXAPARIN SODIUM 40 MG: 100 INJECTION SUBCUTANEOUS at 08:41

## 2025-01-17 RX ADMIN — SODIUM CHLORIDE, PRESERVATIVE FREE 10 ML: 5 INJECTION INTRAVENOUS at 08:43

## 2025-01-17 RX ADMIN — OXYCODONE 10 MG: 5 TABLET ORAL at 08:41

## 2025-01-17 RX ADMIN — ACETAMINOPHEN 650 MG: 325 TABLET ORAL at 12:42

## 2025-01-17 ASSESSMENT — PAIN - FUNCTIONAL ASSESSMENT
PAIN_FUNCTIONAL_ASSESSMENT: ACTIVITIES ARE NOT PREVENTED
PAIN_FUNCTIONAL_ASSESSMENT: ACTIVITIES ARE NOT PREVENTED

## 2025-01-17 ASSESSMENT — PAIN DESCRIPTION - FREQUENCY
FREQUENCY: CONTINUOUS
FREQUENCY: CONTINUOUS

## 2025-01-17 ASSESSMENT — PAIN DESCRIPTION - LOCATION
LOCATION: ANKLE;FOOT
LOCATION: LEG

## 2025-01-17 ASSESSMENT — PAIN DESCRIPTION - PAIN TYPE
TYPE: SURGICAL PAIN
TYPE: SURGICAL PAIN

## 2025-01-17 ASSESSMENT — PAIN DESCRIPTION - ONSET
ONSET: ON-GOING
ONSET: ON-GOING

## 2025-01-17 ASSESSMENT — PAIN DESCRIPTION - ORIENTATION
ORIENTATION: MID
ORIENTATION: RIGHT;LOWER

## 2025-01-17 ASSESSMENT — PAIN SCALES - GENERAL
PAINLEVEL_OUTOF10: 9
PAINLEVEL_OUTOF10: 2
PAINLEVEL_OUTOF10: 0
PAINLEVEL_OUTOF10: 2

## 2025-01-17 ASSESSMENT — PAIN DESCRIPTION - DESCRIPTORS
DESCRIPTORS: ACHING
DESCRIPTORS: ACHING

## 2025-01-17 NOTE — TELEPHONE ENCOUNTER
FYI  Patient refused to be seen tomorrow and requests that they see him on Monday. They will be outside of the 48 hour window at pts request.

## 2025-01-17 NOTE — DISCHARGE SUMMARY
[facility]   Discharge Summary         Patient ID:  Koffi Henderson  [id number]  54 y.o.  1970    Admit date: 1/15/2025  Discharge date and time:    Admitting Physician: Joey Holm MD  Surgeon: Same    Hospital Course    Admission Diagnoses: Pre op diagnosis: Bimalleolar fracture of right ankle [S82.841A]  Prior to surgery the patient was seen for consultation in the office or hospital and a complete history and physical was taken as it pertained to their condition.   Discharge Diagnoses: Bimalleolar fracture of right ankle. Chronic and Acute medical problems addressed during this hospital stay by consulting physicians include:   They underwent Procedure(s) (LRB):  RIGHT ANKLE OPEN REDUCTION INTERNAL FIXATION (Right) for this.       Principle Problem: Bimalleolar fracture of right ankle.     Other Chronic and Acute Medical Issues: managed by the hospitalist during admission included: Principal Problem:    Bimalleolar fracture of right ankle  Active Problems:    Systolic CHF, chronic (HCC)    DM (diabetes mellitus) (HCC)    HTN (hypertension)    S/P foot joint surgery    Closed right ankle fracture, sequela  Resolved Problems:    * No resolved hospital problems. *                               Perioperative Antibiotics:  Prior to surgery Ancef 1 to 2 mg was given depending on patient's weight and allergies.  If the patient was allergic to Ancef or MRSA positive  the patient was given Vancomycin and Cleocin        Hospital Medications:   Current Facility-Administered Medications   Medication Dose Route Frequency    insulin glargine (LANTUS) injection vial 5 Units  5 Units SubCUTAneous Nightly    ezetimibe (ZETIA) tablet 10 mg  10 mg Oral Nightly    gabapentin (NEURONTIN) capsule 600 mg  600 mg Oral Nightly    losartan (COZAAR) tablet 25 mg  25 mg Oral Daily    sertraline (ZOLOFT) tablet 50 mg  50 mg Oral Daily    traZODone (DESYREL) tablet 150 mg  150 mg Oral Nightly    docusate sodium (COLACE)  tablet  Commonly known as: DESYREL     VITAMIN C PO     VITAMIN D PO     ZINC PO            STOP taking these medications      HYDROcodone-acetaminophen  MG per tablet  Commonly known as: Norco            ASK your doctor about these medications      metFORMIN 500 MG tablet  Commonly known as: GLUCOPHAGE                Discharge instructions:  - Refer to the Medication Reconciliation sheet for all discharge medications   -Rx pain medication given   -Resume pre hospital diet    - NWB RLE  -Keep splint clean and dry and intact until first follow-up            -Ambulate with walker and fall precautions   -Follow up in office as scheduled       Signed:  SHAHZAD Pisano  1/17/2025  8:11 AM

## 2025-01-17 NOTE — CARE COORDINATION
Pt is medically cleared for dc to home today.  CM met with pt to discuss HH services.  Demographics, insurance and PCP confirmed.  Pt's contact # corrected in chart and emergency contacts added/updated. Pt is agreeable with HH services and expressed no preference of agency.  HH PT/OT referral submitted to [facility] and pt accepted for services.  Pt inquired about a referral for an aide/homemaker services.  Referral submitted to [facility] via the [place] online portal.  Confirmation #[id number].  CM provided pt with a [facility] brochure with the contact number to call to check on the status of the referral.  CM did explain to pt that it could take weeks and maybe even months for them to evaluate him and start services if he qualifies.  He verbalized understanding and stated that he has a friend that is coming to stay with him at dc that can help out.  Pt stated his aunt will transport him home.  No other TIFFANY needs or concerns identified or reported.  CM remains available to assist as needed.       01/17/25 1147   Service Assessment   Patient Orientation Alert and Oriented   Cognition Alert   History Provided By Patient;Medical Record   Primary Caregiver Self   Accompanied By/Relationship n/a   Support Systems Family Members;Friends/Neighbors;[facility]   Patient's Healthcare Decision Maker is: Legal Next of Kin   PCP Verified by CM Yes   Last Visit to PCP Within last 3 months  (10/23/2024)   Prior Functional Level Independent in ADLs/IADLs   Current Functional Level Assistance with the following:;Mobility;Housework;Shopping;Cooking   Can patient return to prior living arrangement Yes   Ability to make needs known: Good   Family able to assist with home care needs: Yes  (friends and aunt can assist)   Would you like for me to discuss the discharge plan with any other family members/significant others, and if so, who? No   Financial Resources Medicaid;Medicare   Community Resources None   Social/Functional  History   Lives With Alone   Type of Home House   Home Layout One level   Home Access Stairs to enter without rails   Entrance Stairs - Number of Steps 1   Bathroom Shower/Tub Tub/Shower unit   Bathroom Toilet Handicap height   Bathroom Equipment Tub transfer bench;Grab bars around toilet   Bathroom Accessibility Accessible   Home Equipment Crutches;Grab bars;Wheelchair - Manual   Receives Help From Friend(s)   Prior Level of Assist for ADLs Independent   Prior Level of Assist for Homemaking Independent   Homemaking Responsibilities Yes   Ambulation Assistance Independent   Prior Level of Assist for Transfers Independent   Active  Yes   Mode of Transportation Car   Occupation Part time employment   Type of Occupation    Discharge Planning   Type of Residence House   Living Arrangements Alone  (a friend plans to come stay with him)   Current Services Prior To Admission Durable Medical Equipment   Current DME Prior to Arrival Crutches;Wheelchair   Potential Assistance Needed Home Care   DME Ordered? No   Potential Assistance Purchasing Medications No  (pt has medicaid)   Type of Home Care Services PT;OT   Patient expects to be discharged to: House   History of falls? 1   Services At/After Discharge   Transition of Care Consult (CM Consult) Home Health   Internal Home Health Yes   Services At/After Discharge OT;PT;Home Health;Community Resources  ([facility] referral for personal care aide/homemaker services.)    Resource Information Provided? No   Mode of Transport at Discharge Other (see comment)  (aunt)   Confirm Follow Up Transport Friends   Condition of Participation: Discharge Planning   The Plan for Transition of Care is related to the following treatment goals: Home with HH therapy services to support pt's post-acute care and recovery in the home   The Patient and/or Patient Representative was provided with a Choice of Provider? Patient   The Patient and/Or Patient Representative agree

## 2025-01-17 NOTE — PROGRESS NOTES
Recommend decreasing Lantus from 80 units to 5 units SQ nightly on discharge, A1c of 6.1.  Patient can follow-up with PCP in 1 to 2 weeks after discharge.

## 2025-01-17 NOTE — PROGRESS NOTES
Patient discharged to home with family. Educated on medication changes and follow-up appointments. IV removed; all questions answered.

## 2025-01-17 NOTE — PROGRESS NOTES
[facility]        2025         Post Op day: 2 Days Post-Op Procedure(s) (LRB):  RIGHT ANKLE OPEN REDUCTION INTERNAL FIXATION (Right)      Admit Date: 1/15/2025  Admit Diagnosis: Bimalleolar fracture of right ankle [S82.841A]  S/P foot joint surgery [Z98.890]  Closed right ankle fracture, sequela [S82.891S]       Principle Problem: Bimalleolar fracture of right ankle.         Subjective: Doing well. Had a lot of pain last night and hurting this morning but states he is ready to go home. No SOB, No Chest Pain, No Nausea or Vomiting     Objective:   Vital Signs are Stable, No Acute Distress, Alert,  splint c/d/i,  Neurovascular exam is normal.     Assessment / Plan :  Patient Active Problem List   Diagnosis    Sepsis due to methicillin susceptible Staphylococcus aureus (HCC)    Acute pyelonephritis    Systolic CHF, chronic (HCC)    Acute septic pulmonary embolism without acute cor pulmonale (HCC)    HLD (hyperlipidemia)    Low back pain    Esophageal reflux    DM (diabetes mellitus) (HCC)    HTN (hypertension)    Allergic rhinitis    Bacteremia due to methicillin susceptible Staphylococcus aureus (MSSA)    Pulmonary cavitary lesion    Tendinitis of right triceps    Olecranon bursitis of right elbow    Closed disp trimalleolar fx of right lower leg with routine healing    Bimalleolar fracture of right ankle    S/P foot joint surgery    Closed right ankle fracture, sequela    Patient Vitals for the past 8 hrs:   BP Temp Pulse Resp SpO2   25 0714 108/67 97.7 °F (36.5 °C) 76 16 95 %   25 0310 115/73 97.9 °F (36.6 °C) 62 15 --    Temp (24hrs), Av.8 °F (36.6 °C), Min:97.7 °F (36.5 °C), Max:97.9 °F (36.6 °C)    Body mass index is 25.66 kg/m².    Lab Results   Component Value Date/Time    HGB 12.8 2025 04:52 AM          S/P Procedure(s) (LRB):  RIGHT ANKLE OPEN REDUCTION INTERNAL FIXATION (Right)      Vit D 2000iu daily x 12 weeks   Medical Mgmt per hospitalist  Anticoagulation

## 2025-01-17 NOTE — PLAN OF CARE
Problem: Pain  Goal: Verbalizes/displays adequate comfort level or baseline comfort level  1/16/2025 2243 by Manuela Mustafa GN  Outcome: Progressing  Flowsheets (Taken 1/16/2025 2115)  Verbalizes/displays adequate comfort level or baseline comfort level: Encourage patient to monitor pain and request assistance     Problem: Chronic Conditions and Co-morbidities  Goal: Patient's chronic conditions and co-morbidity symptoms are monitored and maintained or improved  1/16/2025 2243 by Manuela Mustafa GN  Outcome: Progressing  Flowsheets (Taken 1/16/2025 2115)  Care Plan - Patient's Chronic Conditions and Co-Morbidity Symptoms are Monitored and Maintained or Improved: Monitor and assess patient's chronic conditions and comorbid symptoms for stability, deterioration, or improvement     Problem: Discharge Planning  Goal: Discharge to home or other facility with appropriate resources  1/16/2025 2243 by Manuela Mustafa GN  Outcome: Progressing  Flowsheets (Taken 1/16/2025 2115)  Discharge to home or other facility with appropriate resources:   Identify barriers to discharge with patient and caregiver   Identify discharge learning needs (meds, wound care, etc)   Arrange for needed discharge resources and transportation as appropriate   Refer to discharge planning if patient needs post-hospital services based on physician order or complex needs related to functional status, cognitive ability or social support system     Problem: ABCDS Injury Assessment  Goal: Absence of physical injury  1/16/2025 2243 by Manuela Mustafa GN  Outcome: Progressing  Flowsheets (Taken 1/16/2025 2115)  Absence of Physical Injury: Implement safety measures based on patient assessment     Problem: Safety - Adult  Goal: Free from fall injury  1/16/2025 2243 by Manuela Mustafa GN  Outcome: Progressing  Flowsheets (Taken 1/16/2025 2115)  Free From Fall Injury: Instruct family/caregiver on patient safety

## 2025-01-20 ENCOUNTER — HOSPITAL ENCOUNTER (EMERGENCY)
Age: 55
Discharge: LEFT BEFORE DISCHARGE | End: 2025-01-20
Attending: STUDENT IN AN ORGANIZED HEALTH CARE EDUCATION/TRAINING PROGRAM

## 2025-01-20 VITALS
DIASTOLIC BLOOD PRESSURE: 80 MMHG | WEIGHT: 184 LBS | HEIGHT: 71 IN | SYSTOLIC BLOOD PRESSURE: 111 MMHG | TEMPERATURE: 97.9 F | OXYGEN SATURATION: 99 % | HEART RATE: 116 BPM | BODY MASS INDEX: 25.76 KG/M2 | RESPIRATION RATE: 16 BRPM

## 2025-01-20 DIAGNOSIS — G89.18 POST-OP PAIN: Primary | ICD-10-CM

## 2025-01-20 DIAGNOSIS — S82.851D CLOSED DISP TRIMALLEOLAR FX OF RIGHT LOWER LEG WITH ROUTINE HEALING: ICD-10-CM

## 2025-01-20 RX ORDER — OXYCODONE HYDROCHLORIDE 5 MG/1
5 TABLET ORAL EVERY 6 HOURS PRN
Qty: 20 TABLET | Refills: 0 | Status: SHIPPED | OUTPATIENT
Start: 2025-01-20 | End: 2025-01-27

## 2025-01-20 ASSESSMENT — PAIN DESCRIPTION - DESCRIPTORS: DESCRIPTORS: ACHING

## 2025-01-20 ASSESSMENT — PAIN SCALES - GENERAL: PAINLEVEL_OUTOF10: 10

## 2025-01-20 ASSESSMENT — PAIN DESCRIPTION - LOCATION: LOCATION: ANKLE

## 2025-01-20 ASSESSMENT — PAIN - FUNCTIONAL ASSESSMENT: PAIN_FUNCTIONAL_ASSESSMENT: 0-10

## 2025-01-20 ASSESSMENT — PAIN DESCRIPTION - ORIENTATION: ORIENTATION: RIGHT

## 2025-01-20 NOTE — ED NOTES
Unable to locate patient. [facility] and [facility].      Obinna Vazquez, RN  01/20/25 [id number]

## 2025-01-20 NOTE — ED TRIAGE NOTES
Patient arrives to ED pov from home. Patient had right ankle open reduction internal fixation on 1/15/2025. Patient reports increased pain and states he requested for refill but they did not give him any. Patient denies fevers.

## 2025-01-22 ENCOUNTER — TELEPHONE (OUTPATIENT)
Dept: ORTHOPEDIC SURGERY | Age: 55
End: 2025-01-22

## 2025-01-22 NOTE — TELEPHONE ENCOUNTER
Home health nurse wants to speak with Shelia Loving    [phone]  In regards to this pts  well being      I  sent a text message to Shelia  at  4:39 pm also
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
## 2025-01-23 ENCOUNTER — TELEPHONE (OUTPATIENT)
Dept: ORTHOPEDIC SURGERY | Age: 55
End: 2025-01-23
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
## 2025-01-29 ENCOUNTER — TELEPHONE (OUTPATIENT)
Dept: ORTHOPEDIC SURGERY | Age: 55
End: 2025-01-29

## 2025-01-30 DIAGNOSIS — S82.851D CLOSED DISP TRIMALLEOLAR FX OF RIGHT LOWER LEG WITH ROUTINE HEALING: Primary | ICD-10-CM

## 2025-01-30 RX ORDER — OXYCODONE HYDROCHLORIDE 5 MG/1
5 TABLET ORAL EVERY 6 HOURS PRN
Qty: 20 TABLET | Refills: 0 | Status: SHIPPED | OUTPATIENT
Start: 2025-01-30 | End: 2025-02-04

## 2025-02-13 ENCOUNTER — OFFICE VISIT (OUTPATIENT)
Dept: ORTHOPEDIC SURGERY | Age: 55
End: 2025-02-13

## 2025-02-13 DIAGNOSIS — G89.18 POST-OP PAIN: Primary | ICD-10-CM

## 2025-02-13 DIAGNOSIS — S82.851D CLOSED DISP TRIMALLEOLAR FX OF RIGHT LOWER LEG WITH ROUTINE HEALING: ICD-10-CM

## 2025-02-13 PROCEDURE — 99024 POSTOP FOLLOW-UP VISIT: CPT | Performed by: PHYSICIAN ASSISTANT

## 2025-02-13 RX ORDER — HYDROCODONE BITARTRATE AND ACETAMINOPHEN 7.5; 325 MG/1; MG/1
1 TABLET ORAL EVERY 6 HOURS PRN
Qty: 28 TABLET | Refills: 0 | Status: SHIPPED | OUTPATIENT
Start: 2025-02-13 | End: 2025-02-20

## 2025-02-13 NOTE — PROGRESS NOTES
The patient was prescribed a walker boot for the patient's right foot. The patient wears a size 10 shoe and I fitted them with a L size boot. The patient was fitted and instructed on the use of prescribed walker boot by a Registered Orthopedic Technician. I explained how to fit themselves and that the plastic flexible piece should always be on the front of the boot and secured by the Velcro straps on top. The air bladder in the boot was adjusted according to proper fit and comfort. The patient walked a short distance and acknowledged satisfaction with current fit. I also explained that they need a heel lift or a higher heeled shoe for the uninvolved LE to help normalize gait and avoid excessive low back stress/strain due to leg length inequality created from walker boot.Patient read and signed documenting they understand and agree to [facility]'s current DME return policy.

## 2025-02-13 NOTE — PROGRESS NOTES
Name: Koffi Henderson  YOB: 1970  Gender: male  MRN: [id number]    Plan:    Placed in CAM boot today.  This is medically necessary for increased stability and functionality.  NWB in the boot  May come out of the boot to work on gentle ankle range of motion  Norco 7.5 mg every 6 hours    Follow up in 3 week(s)with XR -increase WB and begin PT at this time         Procedure: Right Ankle Open Reduction Internal Fixation - Right    Surgery Date: 1/15/2025      Subjective: Denies fevers chills or infection.  Denies any signs of spreading erythema.  Doing well.  Denies any significant pain.    ROS: Patient Denies fever/chills, headache, visual changes, chest pain, shortness of breath, and nausea/vomiting/diarrhea     Exam:     Wounds: appears to be healing well with good reapproximation, healing well , sutures in place and were removed without difficulty    Vascular: BLE: 2+ DP pulse, toes wwp w/ BCR<2s    Imaging:   Interpretation of imaging and   X-Ray RIGHT Ankle 3 vw (AP/Lateral/Oblique) for ankle pain   Findings: No signs of displacement of the mortise.  The fibula is aligned along with the medial mal. Hardware in place with no signs of breakage or failure. No signs of lucency or infection.     Impression:  Stable ankle fixation

## 2025-02-24 ENCOUNTER — TELEPHONE (OUTPATIENT)
Dept: ORTHOPEDIC SURGERY | Age: 55
End: 2025-02-24

## 2025-02-24 DIAGNOSIS — G89.18 POST-OP PAIN: ICD-10-CM

## 2025-02-24 DIAGNOSIS — S82.851D CLOSED DISP TRIMALLEOLAR FX OF RIGHT LOWER LEG WITH ROUTINE HEALING: Primary | ICD-10-CM

## 2025-02-25 RX ORDER — HYDROCODONE BITARTRATE AND ACETAMINOPHEN 7.5; 325 MG/1; MG/1
1 TABLET ORAL EVERY 6 HOURS PRN
Qty: 28 TABLET | Refills: 0 | Status: SHIPPED | OUTPATIENT
Start: 2025-02-25 | End: 2025-03-04

## 2025-03-06 ENCOUNTER — OFFICE VISIT (OUTPATIENT)
Dept: ORTHOPEDIC SURGERY | Age: 55
End: 2025-03-06

## 2025-03-06 DIAGNOSIS — G89.18 POST-OP PAIN: Primary | ICD-10-CM

## 2025-03-06 DIAGNOSIS — S82.851D CLOSED DISP TRIMALLEOLAR FX OF RIGHT LOWER LEG WITH ROUTINE HEALING: ICD-10-CM

## 2025-03-06 RX ORDER — HYDROCODONE BITARTRATE AND ACETAMINOPHEN 5; 325 MG/1; MG/1
1 TABLET ORAL EVERY 6 HOURS PRN
Qty: 28 TABLET | Refills: 0 | Status: SHIPPED | OUTPATIENT
Start: 2025-03-06 | End: 2025-03-13

## 2025-03-06 NOTE — PROGRESS NOTES
Name: Koffi Henderson  YOB: 1970  Gender: male  MRN: [id number]    Plan:    Increase weightbearing in CAM boot then slow transition to ASO brace.  ASO brace given today.  This is medically necessary as patient transitions from the boot to the brace for increased stability and functionality  Begin formal physical therapy  Work note: May return to work in 3 weeks  Norco 5 mg    Follow up in 6 week(s)with XR          Procedure: Right Ankle Open Reduction Internal Fixation - Right    Surgery Date: 1/15/2025      Subjective: Denies fevers chills or infection.  Denies any signs of spreading erythema.  Doing well.  Denies any significant pain.    ROS: Patient Denies fever/chills, headache, visual changes, chest pain, shortness of breath, and nausea/vomiting/diarrhea     Exam:     Wounds: appears to be healing well with good reapproximation, healed    Vascular: BLE: 2+ DP pulse, toes wwp w/ BCR<2s    Imaging:   Interpretation of imaging and   X-Ray RIGHT Ankle 3 vw (AP/Lateral/Oblique) for ankle pain   Findings: No signs of displacement of the mortise.  The fibula is aligned along with the medial mal. Hardware in place with no signs of breakage or failure. No signs of lucency or infection.     Impression:  Stable ankle fixation

## 2025-03-06 NOTE — PROGRESS NOTES
The patient was prescribed a Wraptor brace for the patient's rightfoot. The patient wears a size 9.5 shoe and I fitted the patient with a L brace. I explained how to fit the brace properly by pulling the lace tabs across top of foot first then under arch and lastly pulling the strap up firmly and attaching to the lateral Velcro strip. Thus forming a figure 8 across the ankle joint. Once the figure 8 is completed they are to secure the top (short circumferential) straps to help avoid the straps from loosening with normal wear.  The patient was able to demonstrate proper fitting in office to ensure compliance with device and acknowledged satisfaction with current fit. Patient read and signed documenting they understand and agree to [facility] current DME return policy.

## 2025-03-12 ENCOUNTER — TELEPHONE (OUTPATIENT)
Dept: ORTHOPEDIC SURGERY | Age: 55
End: 2025-03-12

## 2025-03-12 DIAGNOSIS — G89.18 POST-OP PAIN: Primary | ICD-10-CM

## 2025-03-12 RX ORDER — TRAMADOL HYDROCHLORIDE 50 MG/1
50 TABLET ORAL EVERY 6 HOURS PRN
Qty: 20 TABLET | Refills: 0 | Status: SHIPPED | OUTPATIENT
Start: 2025-03-12 | End: 2025-03-17

## 2025-03-21 ENCOUNTER — TELEPHONE (OUTPATIENT)
Dept: ORTHOPEDIC SURGERY | Age: 55
End: 2025-03-21

## 2025-03-24 DIAGNOSIS — G89.18 POST-OP PAIN: Primary | ICD-10-CM

## 2025-03-24 RX ORDER — HYDROCODONE BITARTRATE AND ACETAMINOPHEN 5; 325 MG/1; MG/1
1 TABLET ORAL EVERY 6 HOURS PRN
Qty: 28 TABLET | Refills: 0 | Status: SHIPPED | OUTPATIENT
Start: 2025-03-24 | End: 2025-03-31

## 2025-04-14 ENCOUNTER — TRANSCRIBE ORDERS (OUTPATIENT)
Dept: SCHEDULING | Age: 55
End: 2025-04-14

## 2025-04-14 DIAGNOSIS — M25.561 RIGHT KNEE PAIN, UNSPECIFIED CHRONICITY: ICD-10-CM

## 2025-04-14 DIAGNOSIS — M54.16 LUMBAR RADICULOPATHY: Primary | ICD-10-CM

## 2025-04-17 ENCOUNTER — OFFICE VISIT (OUTPATIENT)
Dept: ORTHOPEDIC SURGERY | Age: 55
End: 2025-04-17
Payer: MEDICARE

## 2025-04-17 DIAGNOSIS — S82.851D CLOSED DISP TRIMALLEOLAR FX OF RIGHT LOWER LEG WITH ROUTINE HEALING: ICD-10-CM

## 2025-04-17 DIAGNOSIS — M77.8 TENDINITIS OF RIGHT TRICEPS: ICD-10-CM

## 2025-04-17 DIAGNOSIS — G89.18 POST-OP PAIN: Primary | ICD-10-CM

## 2025-04-17 PROCEDURE — 99213 OFFICE O/P EST LOW 20 MIN: CPT | Performed by: PHYSICIAN ASSISTANT

## 2025-04-17 PROCEDURE — G8428 CUR MEDS NOT DOCUMENT: HCPCS | Performed by: PHYSICIAN ASSISTANT

## 2025-04-17 PROCEDURE — 3017F COLORECTAL CA SCREEN DOC REV: CPT | Performed by: PHYSICIAN ASSISTANT

## 2025-04-17 PROCEDURE — G8419 CALC BMI OUT NRM PARAM NOF/U: HCPCS | Performed by: PHYSICIAN ASSISTANT

## 2025-04-17 PROCEDURE — 1036F TOBACCO NON-USER: CPT | Performed by: PHYSICIAN ASSISTANT

## 2025-04-17 RX ORDER — HYDROCODONE BITARTRATE AND ACETAMINOPHEN 5; 325 MG/1; MG/1
1 TABLET ORAL EVERY 6 HOURS PRN
Qty: 28 TABLET | Refills: 0 | Status: SHIPPED | OUTPATIENT
Start: 2025-04-17 | End: 2025-04-24

## 2025-04-17 NOTE — PROGRESS NOTES
Name: Koffi Henderson  YOB: 1970  Gender: male  MRN: [id number]    Plan:    I continue ASO brace and wean out as needed  Continue home exercise program  Norco 5 mg-he was told that this will be his last refill    Follow up in 3 months(s)with XR          Procedure: Right Ankle Open Reduction Internal Fixation - Right    Surgery Date: 1/15/2025      Subjective: Denies fevers chills or infection.  Denies any signs of spreading erythema.  Doing well.  Denies any significant pain.    ROS: Patient Denies fever/chills, headache, visual changes, chest pain, shortness of breath, and nausea/vomiting/diarrhea     Exam:     Wounds: appears to be healing well with good reapproximation, healed    Vascular: BLE: 2+ DP pulse, toes wwp w/ BCR<2s    Imaging:   Interpretation of imaging and   X-Ray RIGHT Ankle 3 vw (AP/Lateral/Oblique) for ankle pain   Findings: No signs of displacement of the mortise.  The fibula is aligned along with the medial mal. Hardware in place with no signs of breakage or failure. No signs of lucency or infection.     Impression:  Stable ankle fixation

## 2025-06-02 ENCOUNTER — HOSPITAL ENCOUNTER (OUTPATIENT)
Dept: GENERAL RADIOLOGY | Age: 55
Discharge: HOME | End: 2025-06-04
Payer: MEDICARE

## 2025-06-02 DIAGNOSIS — M25.562 BILATERAL CHRONIC KNEE PAIN: ICD-10-CM

## 2025-06-02 DIAGNOSIS — M25.561 BILATERAL CHRONIC KNEE PAIN: ICD-10-CM

## 2025-06-02 DIAGNOSIS — G89.29 BILATERAL CHRONIC KNEE PAIN: ICD-10-CM

## 2025-06-02 PROCEDURE — 73562 X-RAY EXAM OF KNEE 3: CPT

## 2025-06-20 ENCOUNTER — HOSPITAL ENCOUNTER (OUTPATIENT)
Age: 55
Discharge: HOME | End: 2025-06-22
Payer: MEDICARE

## 2025-06-20 DIAGNOSIS — M25.561 RIGHT KNEE PAIN, UNSPECIFIED CHRONICITY: ICD-10-CM

## 2025-06-20 DIAGNOSIS — M54.16 LUMBAR RADICULOPATHY: ICD-10-CM

## 2025-06-20 PROCEDURE — 72148 MRI LUMBAR SPINE W/O DYE: CPT

## (undated) DEVICE — SHEET,DRAPE,53X77,STERILE: Brand: MEDLINE

## (undated) DEVICE — ZIMMER® STERILE DISPOSABLE TOURNIQUET CUFF WITH PLC, DUAL PORT, SINGLE BLADDER, 30 IN. (76 CM)

## (undated) DEVICE — CLOTH PRE OP W9XL10.5IN 2% CHG FRAGRANCE RNS FREE READYPREP

## (undated) DEVICE — GLOVE ORANGE PI 7 1/2   MSG9075

## (undated) DEVICE — GUIDEWIRE ORTH L150MM DIA0.053IN W/ TRCR TIP FOR ANK FRAC

## (undated) DEVICE — BANDAGE,ELASTIC,ESMARK,STERILE,4"X9',LF: Brand: MEDLINE

## (undated) DEVICE — FOOT ANKLE PACK: Brand: MEDLINE INDUSTRIES, INC.

## (undated) DEVICE — GOWN,SIRUS,NONRNF,SETINSLV,XL,20/CS: Brand: MEDLINE

## (undated) DEVICE — PADDING CAST W4INXL4YD NONSTERILE COT COHESIVE HND TEARABLE

## (undated) DEVICE — GLOVE SURG SZ 8 L12IN FNGR THK79MIL GRN LTX FREE

## (undated) DEVICE — DRAPE C ARM W/ POLY STRP W42XL72IN FOR MOB XR

## (undated) DEVICE — STERILE PVP: Brand: MEDLINE INDUSTRIES, INC.

## (undated) DEVICE — PADDING CAST W6INXL4YD ST COT COHESIVE HND TEARABLE SPEC

## (undated) DEVICE — GLOVE SURG SZ 85 L12IN FNGR ORTHO 126MIL CRM LTX FREE

## (undated) DEVICE — SOLUTION IRRIG 1000ML 0.9% SOD CHL USP POUR PLAS BTL

## (undated) DEVICE — PAD,ABDOMINAL,5"X9",ST,LF,25/BX: Brand: MEDLINE INDUSTRIES, INC.

## (undated) DEVICE — FOAM BUMP ROUND LARGE: Brand: MEDLINE INDUSTRIES, INC.

## (undated) DEVICE — FIBULOCK IMPL SYS STRL

## (undated) DEVICE — PADDING CAST W6INXL4YD COT COHESIVE HND TEARABLE SPEC 100